# Patient Record
Sex: MALE | Race: BLACK OR AFRICAN AMERICAN | NOT HISPANIC OR LATINO | Employment: FULL TIME | URBAN - METROPOLITAN AREA
[De-identification: names, ages, dates, MRNs, and addresses within clinical notes are randomized per-mention and may not be internally consistent; named-entity substitution may affect disease eponyms.]

---

## 2017-01-05 RX ORDER — ALPRAZOLAM 1 MG/1
TABLET ORAL
Qty: 60 TABLET | Refills: 0 | OUTPATIENT
Start: 2017-01-05

## 2017-01-05 NOTE — TELEPHONE ENCOUNTER
Last office visit 05/18/2016. Last refill date 07/11/2016. Pt is requesting a refill on alprazolam 1mg 1/2 to 1 tablet q 6-8 hour prn

## 2017-01-16 ENCOUNTER — OFFICE VISIT (OUTPATIENT)
Dept: FAMILY MEDICINE | Facility: CLINIC | Age: 62
End: 2017-01-16
Payer: COMMERCIAL

## 2017-01-16 VITALS
HEART RATE: 80 BPM | SYSTOLIC BLOOD PRESSURE: 134 MMHG | WEIGHT: 303.69 LBS | DIASTOLIC BLOOD PRESSURE: 89 MMHG | BODY MASS INDEX: 40.25 KG/M2 | TEMPERATURE: 99 F | HEIGHT: 73 IN | OXYGEN SATURATION: 98 %

## 2017-01-16 DIAGNOSIS — E66.9 OBESITY, DIABETES, AND HYPERTENSION SYNDROME: ICD-10-CM

## 2017-01-16 DIAGNOSIS — F41.9 ANXIETY: ICD-10-CM

## 2017-01-16 DIAGNOSIS — E11.69 COMBINED HYPERLIPIDEMIA ASSOCIATED WITH TYPE 2 DIABETES MELLITUS: ICD-10-CM

## 2017-01-16 DIAGNOSIS — K21.9 GASTROESOPHAGEAL REFLUX DISEASE WITHOUT ESOPHAGITIS: ICD-10-CM

## 2017-01-16 DIAGNOSIS — E11.59 HYPERTENSION ASSOCIATED WITH DIABETES: ICD-10-CM

## 2017-01-16 DIAGNOSIS — E11.59 OBESITY, DIABETES, AND HYPERTENSION SYNDROME: ICD-10-CM

## 2017-01-16 DIAGNOSIS — E11.69 OBESITY, DIABETES, AND HYPERTENSION SYNDROME: ICD-10-CM

## 2017-01-16 DIAGNOSIS — E78.2 COMBINED HYPERLIPIDEMIA ASSOCIATED WITH TYPE 2 DIABETES MELLITUS: ICD-10-CM

## 2017-01-16 DIAGNOSIS — E11.49 TYPE 2 DIABETES MELLITUS WITH NEUROLOGICAL MANIFESTATIONS: Primary | ICD-10-CM

## 2017-01-16 DIAGNOSIS — E66.01 OBESITY, CLASS III, BMI 40-49.9 (MORBID OBESITY): ICD-10-CM

## 2017-01-16 DIAGNOSIS — I15.2 HYPERTENSION ASSOCIATED WITH DIABETES: ICD-10-CM

## 2017-01-16 DIAGNOSIS — I15.2 OBESITY, DIABETES, AND HYPERTENSION SYNDROME: ICD-10-CM

## 2017-01-16 PROCEDURE — 99999 PR PBB SHADOW E&M-EST. PATIENT-LVL III: CPT | Mod: PBBFAC,,, | Performed by: FAMILY MEDICINE

## 2017-01-16 PROCEDURE — 99214 OFFICE O/P EST MOD 30 MIN: CPT | Mod: 25,S$GLB,, | Performed by: FAMILY MEDICINE

## 2017-01-16 PROCEDURE — 3079F DIAST BP 80-89 MM HG: CPT | Mod: S$GLB,,, | Performed by: FAMILY MEDICINE

## 2017-01-16 PROCEDURE — 3044F HG A1C LEVEL LT 7.0%: CPT | Mod: S$GLB,,, | Performed by: FAMILY MEDICINE

## 2017-01-16 PROCEDURE — 3075F SYST BP GE 130 - 139MM HG: CPT | Mod: S$GLB,,, | Performed by: FAMILY MEDICINE

## 2017-01-16 PROCEDURE — 4010F ACE/ARB THERAPY RXD/TAKEN: CPT | Mod: S$GLB,,, | Performed by: FAMILY MEDICINE

## 2017-01-16 PROCEDURE — 1159F MED LIST DOCD IN RCRD: CPT | Mod: S$GLB,,, | Performed by: FAMILY MEDICINE

## 2017-01-16 RX ORDER — BENAZEPRIL HYDROCHLORIDE AND HYDROCHLOROTHIAZIDE 20; 12.5 MG/1; MG/1
TABLET ORAL
Qty: 180 TABLET | Refills: 3 | Status: SHIPPED | OUTPATIENT
Start: 2017-01-16 | End: 2018-08-21 | Stop reason: SDUPTHER

## 2017-01-16 RX ORDER — LANSOPRAZOLE 30 MG/1
30 CAPSULE, DELAYED RELEASE ORAL DAILY
Qty: 90 CAPSULE | Refills: 3 | Status: SHIPPED | OUTPATIENT
Start: 2017-01-16 | End: 2018-04-13 | Stop reason: SDUPTHER

## 2017-01-16 RX ORDER — ALPRAZOLAM 1 MG/1
TABLET ORAL
Qty: 60 TABLET | Refills: 0 | Status: SHIPPED | OUTPATIENT
Start: 2017-01-16 | End: 2017-08-18 | Stop reason: SDUPTHER

## 2017-01-16 RX ORDER — PRAVASTATIN SODIUM 80 MG/1
80 TABLET ORAL DAILY
Qty: 90 TABLET | Refills: 3 | Status: SHIPPED | OUTPATIENT
Start: 2017-01-16 | End: 2017-08-22

## 2017-01-16 RX ORDER — CLONIDINE HYDROCHLORIDE 0.1 MG/1
TABLET ORAL
Qty: 90 TABLET | Refills: 3 | Status: SHIPPED | OUTPATIENT
Start: 2017-01-16 | End: 2018-01-16 | Stop reason: SDUPTHER

## 2017-01-16 RX ORDER — DICLOFENAC SODIUM 75 MG/1
75 TABLET, DELAYED RELEASE ORAL 2 TIMES DAILY
Qty: 60 TABLET | Refills: 5 | Status: SHIPPED | OUTPATIENT
Start: 2017-01-16 | End: 2018-01-25 | Stop reason: SDUPTHER

## 2017-01-16 NOTE — PROGRESS NOTES
"Subjective:      Patient ID: Marcos Marinelli is a 61 y.o. male.    Chief Complaint: Annual Exam    HPI  The patient presents with essential hypertension. The patient is tolerating the medication well and is in excellent compliance. The patient is experiencing no side effects. Counseling was offered regarding low salt diets. The patient has a reduced salt intake. The patient denies chest pain, palpitations, shortness of breath, dyspnea on exertion, left or murmur neck pain, nausea, vomiting, diaphoresis, paroxysmal nocturnal dyspnea, and orthopnea.   Visit Vitals    /89    Pulse 80    Temp 98.6 °F (37 °C) (Oral)    Ht 6' 1" (1.854 m)    Wt (!) 137.8 kg (303 lb 10.9 oz)    SpO2 98%    BMI 40.07 kg/m2        The patient presents with diabetes. The patient denies polyuria, polydipsia, polyphagia, hypoglycemia and paresthesias. The patient's glucose control has been good. Home glucose averages are routinely checked. The patient is without retinopathy currently. The patient has a history of neuropathy. The patient currently complains of no podiatric problems. The patient has excellent compliance.  Lab Results   Component Value Date    HGBA1C 6.1 09/22/2016      The patient presents with hyperlipidemia. The patient reports tolerating the medication well and is in excellent compliance. There have been no medication side effects. The patient denies chest pain, neuropathy, and myalgias. The patient has reduced fat intake and has been exercising. Current treatment has included the medications listed in the med card.   Lab Results   Component Value Date    CHOL 177 09/22/2016    CHOL 148 03/18/2016    CHOL 144 06/04/2015     Lab Results   Component Value Date    HDL 45 09/22/2016    HDL 41 03/18/2016    HDL 45 06/04/2015     Lab Results   Component Value Date    LDLCALC 118.8 09/22/2016    LDLCALC 92.2 03/18/2016    LDLCALC 87.4 06/04/2015     Lab Results   Component Value Date    TRIG 66 09/22/2016    TRIG 74 " 03/18/2016    TRIG 58 06/04/2015     Lab Results   Component Value Date    CHOLHDL 25.4 09/22/2016    CHOLHDL 27.7 03/18/2016    CHOLHDL 31.3 06/04/2015     Lab Results   Component Value Date    ALT 15 09/22/2016    AST 16 09/22/2016    ALKPHOS 69 09/22/2016    BILITOT 0.7 09/22/2016          ASCVD 10-Year Risk Score  Last updated 8 minutes ago         24.9% 0 - 5 Points: Low Risk     5 - 7.5 Points: Medium Risk     7.5 - 100 Points: High Risk       Change:         An Atherosclerotic Cardiovascular Disease (ASCVD) event is defined as myocardial infarction, CHD death, or stroke. The ASCVD risk score ([place]stonedith DE LA CRUZ Jr, et al., 2013) returns the percentage likelihood of a first time ASCVD event.              Age: 61       Sex: Male       Non- : Yes       Smokes Tobacco: No       Has diabetes: Yes       Systolic BP: 134       HDL: 45 mg/dL       Total cholesterol: 177 mg/dL       Prescribed antihypertensives: Yes      I reviewed his risk with him above.      He also has a history of anxiety. He occasionally uses xanax.  He is curretnly doing well with this and the xanax is helping him.      The patient presents with GERD.  Denies chest pain, nausea & vomiting, belching, cramping, distention, dyspepsia, dysphagia, hematochezia, melena, abdominal pain and weight loss.  Current treatment has included medications that are listed in medications list with significant response.  There has been no medicine intolerance.  The patient cannot identify any exacerbating factors.  Avoidance of NSAID's, ASA, carbonated beverages and spicy food was discussed.  He has had an EGD with Dr. Adame.    Health Maintenance Due   Topic Date Due    Influenza Vaccine  08/01/2016       Past Medical History:  Past Medical History   Diagnosis Date    Anxiety     Arthritis      carpal tunnel    Cataract     Colon polyp 11/13/2012     repeat colonosopy in 5 years.    Combined hyperlipidemia associated with type 2 diabetes  mellitus 2/28/2015    ED (erectile dysfunction)     Environmental allergies     GERD (gastroesophageal reflux disease)     Hiatal hernia 11/13/2012     found on EGD    Hyperglycemia     Hyperlipidemia LDL goal < 130     Hypertension goal BP (blood pressure) < 140/90     Insomnia     Strabismus      od as child//    Torn meniscus w-2     right knee    Tremor      controlled with xanax     Past Surgical History   Procedure Laterality Date    Skin lesion excision       removed from head    Esophagogastroduodenoscopy  11/13/2012     Review of patient's allergies indicates:   Allergen Reactions    Codeine      Other reaction(s): Unknown    Escitalopram      Other reaction(s): Headache    Penicillins      Other reaction(s): Unknown    Sulfa (sulfonamide antibiotics) Nausea Only and Rash     Current Outpatient Prescriptions on File Prior to Visit   Medication Sig Dispense Refill    alprazolam (XANAX) 1 MG tablet TAKE 1/2 TO 1 TABLET EVERY 6 TO 8 HOURS AS NEEDED 60 tablet 0    benazepril-hydrochlorthiazide (LOTENSIN HCT) 20-12.5 mg per tablet TAKE 1 TABLET TWICE DAILY 180 tablet 2    cloNIDine (CATAPRES) 0.1 MG tablet TAKE 1 TABLET (0.1 MG TOTAL) BY MOUTH EVERY EVENING. 90 tablet 2    diclofenac (VOLTAREN) 75 MG EC tablet TAKE 1 TABLET TWICE A DAY 20 tablet 0    fexofenadine (ALLEGRA) 180 MG tablet Take 1 tablet (180 mg total) by mouth once daily. 30 tablet 0    lansoprazole (PREVACID) 30 MG capsule TAKE 1 CAPSULE (30 MG TOTAL) BY MOUTH ONCE DAILY. 90 capsule 0    pravastatin (PRAVACHOL) 80 MG tablet Take 1 tablet (80 mg total) by mouth once daily. 90 tablet 3    tramadol (ULTRAM) 50 mg tablet Take 1 tablet (50 mg total) by mouth every 6 (six) hours as needed for Pain. 120 tablet 0     No current facility-administered medications on file prior to visit.      Social History     Social History    Marital status:      Spouse name: N/A    Number of children: N/A    Years of education: N/A  "    Occupational History    Not on file.     Social History Main Topics    Smoking status: Never Smoker    Smokeless tobacco: Never Used    Alcohol use No    Drug use: No    Sexual activity: Yes     Partners: Female     Other Topics Concern    Not on file     Social History Narrative     Family History   Problem Relation Age of Onset    Heart attack Mother     Hypertension Mother     Heart attack Father     Hypertension Father     Brain cancer Father     Cancer Father     Glaucoma Father     Glaucoma Paternal Aunt     Amblyopia Neg Hx     Blindness Neg Hx     Cataracts Neg Hx     Diabetes Neg Hx     Retinal detachment Neg Hx     Strabismus Neg Hx     Stroke Neg Hx     Thyroid disease Neg Hx     Macular degeneration Neg Hx              Review of Systems   Constitutional: Negative.  Negative for chills, diaphoresis and fever.   HENT: Negative for congestion, hearing loss, mouth sores, postnasal drip and sore throat.    Eyes: Negative for pain and visual disturbance.   Respiratory: Negative for cough, chest tightness, shortness of breath and wheezing.    Cardiovascular: Negative for chest pain.   Gastrointestinal: Negative for abdominal pain, anal bleeding, blood in stool, constipation, diarrhea, nausea and vomiting.   Genitourinary: Negative for dysuria and hematuria.   Musculoskeletal: Negative for back pain, neck pain and neck stiffness.   Skin: Negative for rash.   Neurological: Negative for dizziness and weakness.       Objective:     Visit Vitals    /89    Pulse 80    Temp 98.6 °F (37 °C) (Oral)    Ht 6' 1" (1.854 m)    Wt (!) 137.8 kg (303 lb 10.9 oz)    SpO2 98%    BMI 40.07 kg/m2       Physical Exam   Constitutional: He is oriented to person, place, and time. He appears well-developed and well-nourished.   HENT:   Head: Normocephalic and atraumatic.   Right Ear: External ear normal.   Left Ear: External ear normal.   Nose: Nose normal.   Mouth/Throat: Oropharynx is clear " and moist. No oropharyngeal exudate.   Eyes: Conjunctivae and EOM are normal. Pupils are equal, round, and reactive to light. Right eye exhibits no discharge. Left eye exhibits no discharge. No scleral icterus.   Neck: Normal range of motion. Neck supple. No JVD present. No thyromegaly present.   Cardiovascular: Normal rate, regular rhythm, normal heart sounds and intact distal pulses.  Exam reveals no gallop and no friction rub.    No murmur heard.  Pulmonary/Chest: Effort normal and breath sounds normal. No respiratory distress. He has no wheezes. He has no rales. He exhibits no tenderness.   Abdominal: Soft. Bowel sounds are normal. He exhibits no distension and no mass. There is no tenderness. There is no rebound and no guarding.   Musculoskeletal: Normal range of motion. He exhibits no edema or tenderness.   Lymphadenopathy:     He has no cervical adenopathy.   Neurological: He is alert and oriented to person, place, and time. No cranial nerve deficit. Coordination normal.   Skin: Skin is warm and dry. He is not diaphoretic.   Psychiatric: He has a normal mood and affect.       Assessment:     1. Type 2 diabetes mellitus with neurological manifestations    2. Hypertension associated with diabetes    3. Combined hyperlipidemia associated with type 2 diabetes mellitus    4. Anxiety    5. Gastroesophageal reflux disease without esophagitis    6. Obesity, diabetes, and hypertension syndrome    7. Obesity, Class III, BMI 40-49.9 (morbid obesity)        Plan:   Marcos was seen today for annual exam.    Diagnoses and all orders for this visit:    Type 2 diabetes mellitus with neurological manifestations    Hypertension associated with diabetes  -     cloNIDine (CATAPRES) 0.1 MG tablet; TAKE 1 TABLET (0.1 MG TOTAL) BY MOUTH EVERY EVENING.  -     benazepril-hydrochlorthiazide (LOTENSIN HCT) 20-12.5 mg per tablet; TAKE 1 TABLET TWICE DAILY    Combined hyperlipidemia associated with type 2 diabetes mellitus  -      pravastatin (PRAVACHOL) 80 MG tablet; Take 1 tablet (80 mg total) by mouth once daily.  -     Lipid panel; Future  -     Hepatic function panel; Future    Anxiety  -     alprazolam (XANAX) 1 MG tablet; TAKE 1/2 TO 1 TABLET EVERY 6 TO 8 HOURS AS NEEDED    Gastroesophageal reflux disease without esophagitis  -     lansoprazole (PREVACID) 30 MG capsule; Take 1 capsule (30 mg total) by mouth once daily.    Obesity, diabetes, and hypertension syndrome  -     Ambulatory referral to Nutrition Services    Obesity, Class III, BMI 40-49.9 (morbid obesity)  -     Ambulatory referral to Nutrition Services    Other orders  -     diclofenac (VOLTAREN) 75 MG EC tablet; Take 1 tablet (75 mg total) by mouth 2 (two) times daily.  -     Influenza - Quadrivalent (3 years & older) (PF)      F/u in March to look at weight and foot exam.

## 2017-01-16 NOTE — MR AVS SNAPSHOT
[facility]  [address]  [place] [address]  Phone: [phone]  Fax: [phone]                  Marcos Marinelli   2017 3:00 PM   Office Visit    Description:  Male : 1955   Provider:  Karel Belle MD   Department:  [facility]           Reason for Visit     Annual Exam           Diagnoses this Visit        Comments    Type 2 diabetes mellitus with neurological manifestations    -  Primary     Hypertension associated with diabetes         Combined hyperlipidemia associated with type 2 diabetes mellitus         Anxiety         Gastroesophageal reflux disease without esophagitis         Obesity, diabetes, and hypertension syndrome         Obesity, Class III, BMI 40-49.9 (morbid obesity)                To Do List           Future Appointments        Provider Department Dept Phone    2017 9:10 AM LABORATORY, [place] [facility] [phone]    2017 4:00 PM LABORATORY, [address] [facility] [phone]    2017 4:20 PM Rikki Johnson MD [facility] - Hematology Oncology [phone]    3/23/2017 8:10 AM LABORATORY, [place] [facility] [phone]      Goals (5 Years of Data)     None       These Medications        Disp Refills Start End    alprazolam (XANAX) 1 MG tablet 60 tablet 0 2017     TAKE 1/2 TO 1 TABLET EVERY 6 TO 8 HOURS AS NEEDED    Pharmacy: [facility]/pharmacy #[id number]  [place] - [address] [place] [address] Ph #: [phone]       Notes to Pharmacy: Not to exceed 5 additional fills before 2017.    cloNIDine (CATAPRES) 0.1 MG tablet 90 tablet 3 2017     TAKE 1 TABLET (0.1 MG TOTAL) BY MOUTH EVERY EVENING.    Pharmacy: [facility]/pharmacy #[id number] - [place] - [address] Ph #: [phone]       lansoprazole (PREVACID) 30 MG capsule 90 capsule 3 2017     Take 1 capsule (30 mg  total) by mouth once daily. - Oral    Pharmacy: [facility]/pharmacy #[id number] - [address] #: [phone]       pravastatin (PRAVACHOL) 80 MG tablet 90 tablet 3 1/16/2017 1/16/2018    Take 1 tablet (80 mg total) by mouth once daily. - Oral    Pharmacy: [facility]/pharmacy #[id number] - [address] #: [phone]       diclofenac (VOLTAREN) 75 MG EC tablet 60 tablet 5 1/16/2017     Take 1 tablet (75 mg total) by mouth 2 (two) times daily. - Oral    Pharmacy: [facility]/pharmacy #[id number] - [address] #: [phone]       benazepril-hydrochlorthiazide (LOTENSIN HCT) 20-12.5 mg per tablet 180 tablet 3 1/16/2017     TAKE 1 TABLET TWICE DAILY    Ph[facility]: [id number] - [address] #: [phone]         [facility]     [facility] Care [facility] - 24/7 Assistance  Registered nurses in the [facility] provide clinical advisement, health education, appointment booking, and other advisory services.  Call for this free service at [phone].             Medications           Message regarding Medications     Verify the changes and/or additions to your medication regime listed below are the same as discussed with your clinician today.  If any of these changes or additions are incorrect, please notify your healthcare provider.        CHANGE how you are taking these medications     Start Taking Instead of    lansoprazole (PREVACID) 30 MG capsule lansoprazole (PREVACID) 30 MG capsule    Dosage:  Take 1 capsule (30 mg total) by mouth once daily. Dosage:  TAKE 1 CAPSULE (30 MG TOTAL) BY MOUTH ONCE DAILY.    Reason for Change:  Reorder     diclofenac (VOLTAREN) 75 MG EC tablet diclofenac (VOLTAREN) 75 MG EC tablet    Dosage:  Take 1 tablet (75 mg total) by mouth 2 (two) times daily. Dosage:  TAKE 1  "TABLET TWICE A DAY    Reason for Change:  Reorder            Verify that the below list of medications is an accurate representation of the medications you are currently taking.  If none reported, the list may be blank. If incorrect, please contact your healthcare provider. Carry this list with you in case of emergency.           Current Medications     alprazolam (XANAX) 1 MG tablet TAKE 1/2 TO 1 TABLET EVERY 6 TO 8 HOURS AS NEEDED    benazepril-hydrochlorthiazide (LOTENSIN HCT) 20-12.5 mg per tablet TAKE 1 TABLET TWICE DAILY    cloNIDine (CATAPRES) 0.1 MG tablet TAKE 1 TABLET (0.1 MG TOTAL) BY MOUTH EVERY EVENING.    diclofenac (VOLTAREN) 75 MG EC tablet Take 1 tablet (75 mg total) by mouth 2 (two) times daily.    fexofenadine (ALLEGRA) 180 MG tablet Take 1 tablet (180 mg total) by mouth once daily.    lansoprazole (PREVACID) 30 MG capsule Take 1 capsule (30 mg total) by mouth once daily.    pravastatin (PRAVACHOL) 80 MG tablet Take 1 tablet (80 mg total) by mouth once daily.    tramadol (ULTRAM) 50 mg tablet Take 1 tablet (50 mg total) by mouth every 6 (six) hours as needed for Pain.           Clinical Reference Information           Vital Signs - Last Recorded  Most recent update: 1/16/2017  3:02 PM by Allegra Gaming MA    BP Pulse Temp Ht Wt SpO2    134/89 80 98.6 °F (37 °C) (Oral) 6' 1" (1.854 m) (!) 137.8 kg (303 lb 10.9 oz) 98%    BMI                40.07 kg/m2          Blood Pressure          Most Recent Value    BP  134/89      Allergies as of 1/16/2017     Codeine    Escitalopram    Penicillins    Sulfa (Sulfonamide Antibiotics)      Immunizations Administered on Date of Encounter - 1/16/2017     Name Date Dose VIS Date Route    influenza - Quadrivalent - PF (ADULT)  Incomplete 0.5 mL 8/7/2015 Intramuscular      Orders Placed During Today's Visit      Normal Orders This Visit    Ambulatory referral to Nutrition Services     Influenza - Quadrivalent (3 years & older) (PF)     Future Labs/Procedures " Expected by Expires    Hepatic function panel  1/17/2017 1/20/2017    Lipid panel  1/17/2017 (Approximate) 1/20/2017

## 2017-01-17 ENCOUNTER — LAB VISIT (OUTPATIENT)
Dept: LAB | Facility: HOSPITAL | Age: 62
End: 2017-01-17
Attending: FAMILY MEDICINE
Payer: COMMERCIAL

## 2017-01-17 DIAGNOSIS — E11.69 COMBINED HYPERLIPIDEMIA ASSOCIATED WITH TYPE 2 DIABETES MELLITUS: ICD-10-CM

## 2017-01-17 DIAGNOSIS — E78.2 COMBINED HYPERLIPIDEMIA ASSOCIATED WITH TYPE 2 DIABETES MELLITUS: ICD-10-CM

## 2017-01-17 LAB
ALBUMIN SERPL BCP-MCNC: 3.5 G/DL
ALP SERPL-CCNC: 58 U/L
ALT SERPL W/O P-5'-P-CCNC: 15 U/L
AST SERPL-CCNC: 20 U/L
BILIRUB DIRECT SERPL-MCNC: 0.3 MG/DL
BILIRUB SERPL-MCNC: 0.6 MG/DL
CHOLEST/HDLC SERPL: 3.9 {RATIO}
HDL/CHOLESTEROL RATIO: 25.9 %
HDLC SERPL-MCNC: 162 MG/DL
HDLC SERPL-MCNC: 42 MG/DL
LDLC SERPL CALC-MCNC: 109 MG/DL
NONHDLC SERPL-MCNC: 120 MG/DL
PROT SERPL-MCNC: 6.8 G/DL
TRIGL SERPL-MCNC: 55 MG/DL

## 2017-01-17 PROCEDURE — 36415 COLL VENOUS BLD VENIPUNCTURE: CPT | Mod: PO

## 2017-01-17 PROCEDURE — 80076 HEPATIC FUNCTION PANEL: CPT

## 2017-01-17 PROCEDURE — 80061 LIPID PANEL: CPT

## 2017-01-18 DIAGNOSIS — E78.5 HYPERLIPIDEMIA, UNSPECIFIED HYPERLIPIDEMIA TYPE: Primary | ICD-10-CM

## 2017-01-18 PROCEDURE — 90686 IIV4 VACC NO PRSV 0.5 ML IM: CPT | Mod: S$GLB,,, | Performed by: FAMILY MEDICINE

## 2017-01-18 PROCEDURE — 90471 IMMUNIZATION ADMIN: CPT | Mod: S$GLB,,, | Performed by: FAMILY MEDICINE

## 2017-01-18 RX ORDER — EZETIMIBE 10 MG/1
10 TABLET ORAL DAILY
COMMUNITY
End: 2017-02-02 | Stop reason: SDUPTHER

## 2017-01-18 NOTE — TELEPHONE ENCOUNTER
Pended to Dr. Belle will schedule once approved. Labs 3 months. Liver lipid. Pt  Will be notified of appointment and a letter will be mailed to remind Pt.

## 2017-01-20 ENCOUNTER — TELEPHONE (OUTPATIENT)
Dept: FAMILY MEDICINE | Facility: CLINIC | Age: 62
End: 2017-01-20

## 2017-01-20 DIAGNOSIS — E78.5 HYPERLIPIDEMIA, UNSPECIFIED HYPERLIPIDEMIA TYPE: Primary | ICD-10-CM

## 2017-01-20 NOTE — TELEPHONE ENCOUNTER
----- Message from Karel Belle MD sent at 1/17/2017  7:34 PM CST -----  Also, start a baby aspirin 81 mg a day.  Put it on the medcard.

## 2017-02-02 RX ORDER — EZETIMIBE 10 MG/1
10 TABLET ORAL DAILY
Qty: 30 TABLET | Refills: 11 | Status: SHIPPED | OUTPATIENT
Start: 2017-02-02 | End: 2017-08-22 | Stop reason: SDUPTHER

## 2017-02-13 ENCOUNTER — OFFICE VISIT (OUTPATIENT)
Dept: HEMATOLOGY/ONCOLOGY | Facility: CLINIC | Age: 62
End: 2017-02-13
Payer: COMMERCIAL

## 2017-02-13 ENCOUNTER — LAB VISIT (OUTPATIENT)
Dept: LAB | Facility: HOSPITAL | Age: 62
End: 2017-02-13
Attending: INTERNAL MEDICINE
Payer: COMMERCIAL

## 2017-02-13 VITALS
OXYGEN SATURATION: 97 % | HEART RATE: 74 BPM | BODY MASS INDEX: 40 KG/M2 | WEIGHT: 301.81 LBS | HEIGHT: 73 IN | SYSTOLIC BLOOD PRESSURE: 123 MMHG | RESPIRATION RATE: 20 BRPM | TEMPERATURE: 98 F | DIASTOLIC BLOOD PRESSURE: 85 MMHG

## 2017-02-13 DIAGNOSIS — D69.6 THROMBOCYTOPENIA: ICD-10-CM

## 2017-02-13 DIAGNOSIS — D72.819 LEUKOPENIA, UNSPECIFIED TYPE: Primary | ICD-10-CM

## 2017-02-13 LAB
BASOPHILS # BLD AUTO: 0.02 K/UL
BASOPHILS NFR BLD: 0.5 %
DIFFERENTIAL METHOD: ABNORMAL
EOSINOPHIL # BLD AUTO: 0.1 K/UL
EOSINOPHIL NFR BLD: 2.2 %
ERYTHROCYTE [DISTWIDTH] IN BLOOD BY AUTOMATED COUNT: 14.3 %
HCT VFR BLD AUTO: 40.4 %
HGB BLD-MCNC: 13.8 G/DL
LYMPHOCYTES # BLD AUTO: 2.1 K/UL
LYMPHOCYTES NFR BLD: 51.9 %
MCH RBC QN AUTO: 30.5 PG
MCHC RBC AUTO-ENTMCNC: 34.2 %
MCV RBC AUTO: 89 FL
MONOCYTES # BLD AUTO: 0.4 K/UL
MONOCYTES NFR BLD: 10.5 %
NEUTROPHILS # BLD AUTO: 1.4 K/UL
NEUTROPHILS NFR BLD: 34.9 %
PLATELET # BLD AUTO: 175 K/UL
PMV BLD AUTO: 10.5 FL
RBC # BLD AUTO: 4.52 M/UL
WBC # BLD AUTO: 4.01 K/UL

## 2017-02-13 PROCEDURE — 3079F DIAST BP 80-89 MM HG: CPT | Mod: S$GLB,,, | Performed by: INTERNAL MEDICINE

## 2017-02-13 PROCEDURE — 99999 PR PBB SHADOW E&M-EST. PATIENT-LVL III: CPT | Mod: PBBFAC,,, | Performed by: INTERNAL MEDICINE

## 2017-02-13 PROCEDURE — 36415 COLL VENOUS BLD VENIPUNCTURE: CPT

## 2017-02-13 PROCEDURE — 3074F SYST BP LT 130 MM HG: CPT | Mod: S$GLB,,, | Performed by: INTERNAL MEDICINE

## 2017-02-13 PROCEDURE — 99214 OFFICE O/P EST MOD 30 MIN: CPT | Mod: S$GLB,,, | Performed by: INTERNAL MEDICINE

## 2017-02-13 PROCEDURE — 85025 COMPLETE CBC W/AUTO DIFF WBC: CPT

## 2017-02-13 NOTE — PROGRESS NOTES
Reason for visit: Leukopenia    The patient is a 61-year-old  male who presents to the hematology oncology clinic today for follow up for leukopenia. He reports chronic fatigue. He denies any fever, chills or night sweats. He denies shortness of breath or chest pain. He denies any melena, hematemesis, or hematuria. Bowel movements are normal. Urination is normal. He denies any loss of appetite.    I have reviewed all of the patient's interval clinical history available in [facility].    PAST MEDICAL HISTORY:   1. Hypertension   2. Dyslipidemia  3. GERD   4. Anxiety/Depression  5. Migraine  6. Lumbar degenerative disc disease    SURGICAL HISTORY:   1. Scalp mass excision which was a lipoma    FAMILY HISTORY: Father  of complications related to a brain tumor at age 81. He denies any other immediate family members with cancer or bleeding/clotting disorders.     SOCIAL HISTORY: He does not smoke cigarettes. He does not drink alcohol. He has never used any recreational drugs. He works as an  and does airconditioning work as well. He is  and lives with his wife in [place]. He has 5 children.     ALLERGIES: Reviewed on medication card.     MEDICATIONS: [Medcard has been reviewed and/or reconciled.]     REVIEW OF SYSTEMS:   GENERAL: [No fevers, chills or sweats. No loss of appetite.] Reports fatigue.  HEENT: [No blurred vision, tinnitus, nasal discharge, sorethroat or dysphagia.]   HEART: [No chest pain, palpitations or shortness of breath.]   LUNGS: [No breathing problems.] He denies cough and hemoptysis.  ABDOMEN: [No abdominal pain, nausea, vomiting, diarrhea, constipation or melena.]   GENITOURINARY: [No bleeding or malodorous discharge.] No dysuria.  NEURO: [No headache, dizziness or vertigo.]   HEMATOLOGY: [No easy bruising, spontaneous bleeding or blood clots in the past].   MUSCULOSKELETAL: Denies myalgias or arthralgias.  SKIN: [No rashes or skin lesions.]   PSYCHIATRY: [No  depression or anxiety.]     PHYSICAL EXAMINATION:   VS: Reviewed on nurse's notes   APPEARANCE: The patient is a obese and well groomed  male who appears in no acute distress.  HEENT: No scleral icterus. No oral ulcers, lesions. Throat clear   HEAD: No sinus tenderness.  NECK: Supple. No palpable lymphadenopathy. Thyroid non-tender, no palpable masses.  CHEST: Clear to auscultation bilaterally. No rales. No rhonchi. Unlabored respirations.   CARDIOVASCULAR: Normal S1, S2. Normal rate. Regular rhythm.   ABDOMEN: Bowel sounds normal. No abdominal tenderness or distension. No hepatomegaly. No splenomegaly.   LYMPHATIC: No palpable supraclavicular, axillary nodes   EXTREMITIES: No clubbing, cyanosis. No edema in bilateral lower extremities.   SKIN: No lesions. No petechiae. No ecchymoses. No induration or nodules.   NEUROLOGIC: No focal findings. Alert & Oriented x 3. Mood appropriate to affect     LABS:   Reviewed     IMAGING:   Reviewed     IMPRESSION:   1. Normocytic anemia  2. Thrombocytopenia  3. Chronic leukopenia    PLAN:   1. I had a detailed discussion with the patient today about the results of his CBC. No significant anemia and thrombocytopenia noted today. His chronic leukopenia is stable. We will continue with close observation at this time. No urgent indication for any imaging or bone marrow biopsy at this time. He is clinically asymptomatic.  2. I have recommended proceeding with ultrasound of the abdomen to evaluate for fatty liver with his prior history of thrombocytopenia.    Follow up in 6 months with CBC. He knows to call sooner for any new problems or questions.    Rikki Johnson MD

## 2017-02-24 ENCOUNTER — TELEPHONE (OUTPATIENT)
Dept: RADIOLOGY | Facility: HOSPITAL | Age: 62
End: 2017-02-24

## 2017-02-27 ENCOUNTER — TELEPHONE (OUTPATIENT)
Dept: RADIOLOGY | Facility: HOSPITAL | Age: 62
End: 2017-02-27

## 2017-02-28 ENCOUNTER — HOSPITAL ENCOUNTER (OUTPATIENT)
Dept: RADIOLOGY | Facility: HOSPITAL | Age: 62
Discharge: HOME | End: 2017-02-28
Attending: INTERNAL MEDICINE
Payer: COMMERCIAL

## 2017-02-28 ENCOUNTER — TELEPHONE (OUTPATIENT)
Dept: HEMATOLOGY/ONCOLOGY | Facility: CLINIC | Age: 62
End: 2017-02-28

## 2017-02-28 DIAGNOSIS — D72.819 LEUKOPENIA: ICD-10-CM

## 2017-02-28 DIAGNOSIS — D64.9 NORMOCYTIC ANEMIA: ICD-10-CM

## 2017-02-28 DIAGNOSIS — D69.6 THROMBOCYTOPENIA: ICD-10-CM

## 2017-02-28 PROCEDURE — 76700 US EXAM ABDOM COMPLETE: CPT | Mod: TC,PO

## 2017-02-28 PROCEDURE — 76700 US EXAM ABDOM COMPLETE: CPT | Mod: 26,,, | Performed by: RADIOLOGY

## 2017-02-28 NOTE — TELEPHONE ENCOUNTER
----- Message from Rikki Johnson MD sent at 2/28/2017  2:49 PM CST -----  Please let him know that ultrasound results including liver and spleen look normal which is good news. Thank you!

## 2017-04-05 ENCOUNTER — LAB VISIT (OUTPATIENT)
Dept: LAB | Facility: HOSPITAL | Age: 62
End: 2017-04-05
Attending: FAMILY MEDICINE
Payer: COMMERCIAL

## 2017-04-05 DIAGNOSIS — E11.9 DIABETES MELLITUS WITHOUT COMPLICATION: ICD-10-CM

## 2017-04-05 DIAGNOSIS — E11.59 HYPERTENSION ASSOCIATED WITH DIABETES: ICD-10-CM

## 2017-04-05 DIAGNOSIS — I15.2 HYPERTENSION ASSOCIATED WITH DIABETES: ICD-10-CM

## 2017-04-05 LAB
ALBUMIN SERPL BCP-MCNC: 3.4 G/DL
ALP SERPL-CCNC: 64 U/L
ALT SERPL W/O P-5'-P-CCNC: 12 U/L
ANION GAP SERPL CALC-SCNC: 5 MMOL/L
AST SERPL-CCNC: 18 U/L
BILIRUB SERPL-MCNC: 0.6 MG/DL
BUN SERPL-MCNC: 11 MG/DL
CALCIUM SERPL-MCNC: 8.8 MG/DL
CHLORIDE SERPL-SCNC: 103 MMOL/L
CO2 SERPL-SCNC: 31 MMOL/L
CREAT SERPL-MCNC: 1 MG/DL
EST. GFR  (AFRICAN AMERICAN): >60 ML/MIN/1.73 M^2
EST. GFR  (NON AFRICAN AMERICAN): >60 ML/MIN/1.73 M^2
GLUCOSE SERPL-MCNC: 88 MG/DL
POTASSIUM SERPL-SCNC: 3.9 MMOL/L
PROT SERPL-MCNC: 6.6 G/DL
SODIUM SERPL-SCNC: 139 MMOL/L

## 2017-04-05 PROCEDURE — 80053 COMPREHEN METABOLIC PANEL: CPT

## 2017-04-05 PROCEDURE — 36415 COLL VENOUS BLD VENIPUNCTURE: CPT | Mod: PO

## 2017-04-05 PROCEDURE — 83036 HEMOGLOBIN GLYCOSYLATED A1C: CPT

## 2017-04-06 LAB
ESTIMATED AVG GLUCOSE: 128 MG/DL
HBA1C MFR BLD HPLC: 6.1 %

## 2017-04-07 ENCOUNTER — TELEPHONE (OUTPATIENT)
Dept: FAMILY MEDICINE | Facility: CLINIC | Age: 62
End: 2017-04-07

## 2017-04-07 DIAGNOSIS — E11.9 DIABETES MELLITUS WITHOUT COMPLICATION: Primary | ICD-10-CM

## 2017-04-07 NOTE — TELEPHONE ENCOUNTER
I have signed for the following orders AND/OR meds.  Please call the patient and ask the patient to schedule the testing AND/OR inform about any medications that were sent.      Orders Placed This Encounter   Procedures    Hemoglobin A1c     Standing Status:   Future     Standing Expiration Date:   6/6/2018

## 2017-07-21 DIAGNOSIS — F41.9 ANXIETY: ICD-10-CM

## 2017-07-22 RX ORDER — ALPRAZOLAM 1 MG/1
TABLET ORAL
Qty: 60 TABLET | OUTPATIENT
Start: 2017-07-22

## 2017-07-24 DIAGNOSIS — F41.9 ANXIETY: ICD-10-CM

## 2017-07-24 RX ORDER — ALPRAZOLAM 1 MG/1
TABLET ORAL
Qty: 60 TABLET | Refills: 0 | OUTPATIENT
Start: 2017-07-24

## 2017-07-24 NOTE — TELEPHONE ENCOUNTER
----- Message from Peri Obrien sent at 7/24/2017 10:26 AM CDT -----  Contact: pt  Pt states he is returning the nurse's call regarding his medication.  Pt states he needs an authorization.    1. What is the name of the medication you are requesting? Xanax  2. What is the dose? n/a  3. How do you take the medication? Orally, topically, etc? orally  4. How often do you take this medication? As needed  5. Do you need a 30 day or 90 day supply? 30  6. How many refills are you requesting? 3  7. What is your preferred pharmacy and location of the pharmacy? [facility] in [place]  8. Who can we contact with further questions? Pt @ [phone]

## 2017-07-27 ENCOUNTER — TELEPHONE (OUTPATIENT)
Dept: FAMILY MEDICINE | Facility: CLINIC | Age: 62
End: 2017-07-27

## 2017-07-27 NOTE — TELEPHONE ENCOUNTER
----- Message from Ness Kumar sent at 7/27/2017 10:50 AM CDT -----  Would like to speak to nurse. Please call back at [phone]. thanks

## 2017-07-27 NOTE — TELEPHONE ENCOUNTER
Pt returning call about the denial of his prescription. Pt scheduled appointment 08/18/17 @ 10:00.

## 2017-08-18 ENCOUNTER — OFFICE VISIT (OUTPATIENT)
Dept: FAMILY MEDICINE | Facility: CLINIC | Age: 62
End: 2017-08-18
Payer: COMMERCIAL

## 2017-08-18 ENCOUNTER — LAB VISIT (OUTPATIENT)
Dept: LAB | Facility: HOSPITAL | Age: 62
End: 2017-08-18
Attending: FAMILY MEDICINE
Payer: COMMERCIAL

## 2017-08-18 ENCOUNTER — TELEPHONE (OUTPATIENT)
Dept: OPHTHALMOLOGY | Facility: CLINIC | Age: 62
End: 2017-08-18

## 2017-08-18 VITALS
DIASTOLIC BLOOD PRESSURE: 86 MMHG | BODY MASS INDEX: 37.57 KG/M2 | TEMPERATURE: 98 F | HEART RATE: 88 BPM | SYSTOLIC BLOOD PRESSURE: 130 MMHG | HEIGHT: 73 IN | WEIGHT: 283.5 LBS

## 2017-08-18 DIAGNOSIS — E11.69 COMBINED HYPERLIPIDEMIA ASSOCIATED WITH TYPE 2 DIABETES MELLITUS: ICD-10-CM

## 2017-08-18 DIAGNOSIS — E78.2 COMBINED HYPERLIPIDEMIA ASSOCIATED WITH TYPE 2 DIABETES MELLITUS: ICD-10-CM

## 2017-08-18 DIAGNOSIS — R76.8 ABNORMAL HEPATITIS SEROLOGY: Primary | ICD-10-CM

## 2017-08-18 DIAGNOSIS — I15.2 HYPERTENSION ASSOCIATED WITH DIABETES: ICD-10-CM

## 2017-08-18 DIAGNOSIS — F41.9 ANXIETY: ICD-10-CM

## 2017-08-18 DIAGNOSIS — K21.9 GASTROESOPHAGEAL REFLUX DISEASE WITHOUT ESOPHAGITIS: ICD-10-CM

## 2017-08-18 DIAGNOSIS — E11.49 TYPE 2 DIABETES MELLITUS WITH NEUROLOGICAL MANIFESTATIONS: ICD-10-CM

## 2017-08-18 DIAGNOSIS — E11.59 HYPERTENSION ASSOCIATED WITH DIABETES: ICD-10-CM

## 2017-08-18 DIAGNOSIS — R76.8 ABNORMAL HEPATITIS SEROLOGY: ICD-10-CM

## 2017-08-18 LAB
ALBUMIN SERPL BCP-MCNC: 3.8 G/DL
ALP SERPL-CCNC: 70 U/L
ALT SERPL W/O P-5'-P-CCNC: 19 U/L
ANION GAP SERPL CALC-SCNC: 6 MMOL/L
AST SERPL-CCNC: 22 U/L
BILIRUB SERPL-MCNC: 0.9 MG/DL
BUN SERPL-MCNC: 12 MG/DL
CALCIUM SERPL-MCNC: 9.6 MG/DL
CHLORIDE SERPL-SCNC: 102 MMOL/L
CHOLEST/HDLC SERPL: 3.5 {RATIO}
CO2 SERPL-SCNC: 31 MMOL/L
CREAT SERPL-MCNC: 1.1 MG/DL
EST. GFR  (AFRICAN AMERICAN): >60 ML/MIN/1.73 M^2
EST. GFR  (NON AFRICAN AMERICAN): >60 ML/MIN/1.73 M^2
GLUCOSE SERPL-MCNC: 96 MG/DL
HDL/CHOLESTEROL RATIO: 28.2 %
HDLC SERPL-MCNC: 181 MG/DL
HDLC SERPL-MCNC: 51 MG/DL
LDLC SERPL CALC-MCNC: 118 MG/DL
NONHDLC SERPL-MCNC: 130 MG/DL
POTASSIUM SERPL-SCNC: 4.1 MMOL/L
PROT SERPL-MCNC: 7.2 G/DL
SODIUM SERPL-SCNC: 139 MMOL/L
TRIGL SERPL-MCNC: 60 MG/DL

## 2017-08-18 PROCEDURE — 3008F BODY MASS INDEX DOCD: CPT | Mod: S$GLB,,, | Performed by: FAMILY MEDICINE

## 2017-08-18 PROCEDURE — 4010F ACE/ARB THERAPY RXD/TAKEN: CPT | Mod: S$GLB,,, | Performed by: FAMILY MEDICINE

## 2017-08-18 PROCEDURE — 3079F DIAST BP 80-89 MM HG: CPT | Mod: S$GLB,,, | Performed by: FAMILY MEDICINE

## 2017-08-18 PROCEDURE — 80061 LIPID PANEL: CPT

## 2017-08-18 PROCEDURE — 36415 COLL VENOUS BLD VENIPUNCTURE: CPT | Mod: PO

## 2017-08-18 PROCEDURE — 3075F SYST BP GE 130 - 139MM HG: CPT | Mod: S$GLB,,, | Performed by: FAMILY MEDICINE

## 2017-08-18 PROCEDURE — 80074 ACUTE HEPATITIS PANEL: CPT

## 2017-08-18 PROCEDURE — 99214 OFFICE O/P EST MOD 30 MIN: CPT | Mod: S$GLB,,, | Performed by: FAMILY MEDICINE

## 2017-08-18 PROCEDURE — 3044F HG A1C LEVEL LT 7.0%: CPT | Mod: S$GLB,,, | Performed by: FAMILY MEDICINE

## 2017-08-18 PROCEDURE — 99999 PR PBB SHADOW E&M-EST. PATIENT-LVL III: CPT | Mod: PBBFAC,,, | Performed by: FAMILY MEDICINE

## 2017-08-18 PROCEDURE — 80053 COMPREHEN METABOLIC PANEL: CPT

## 2017-08-18 RX ORDER — ALPRAZOLAM 1 MG/1
TABLET ORAL
Qty: 60 TABLET | Refills: 0 | Status: SHIPPED | OUTPATIENT
Start: 2017-08-18 | End: 2017-08-18 | Stop reason: SDUPTHER

## 2017-08-18 RX ORDER — ALPRAZOLAM 1 MG/1
TABLET ORAL
Qty: 60 TABLET | Refills: 5 | Status: SHIPPED | OUTPATIENT
Start: 2017-08-18 | End: 2018-04-18

## 2017-08-18 NOTE — PROGRESS NOTES
"Subjective:      Patient ID: Marcos Marinelli is a 62 y.o. male.    Chief Complaint: Medication Refill    HPI  He states that he had given plasma recently and was told that he had "hepatitis" and they could not use it. He wants to get checked for this. kimi case is unsure of what type of hepatitis.     He has a tremendous amount of anxiety and he is on xanax and he states that this does help him and he would like to continue it.  He is on 1 mg tabs and takes 1/2 as needed.    The patient presents with diabetes.  The patient denies polyuria, polydipsia, polyphagia, hypoglycemia and paresthesias.  The patient's glucose control has been good.  Home glucose averages are routinely checked.  The patient is without retinopathy currently.  The patient has no history of neuropathy.  The patient currently complains of no podiatric problems.  The patient has excellent compliance.  Hemoglobin A1C   Date Value Ref Range Status   04/05/2017 6.1 4.5 - 6.2 % Final     Comment:     According to ADA guidelines, hemoglobin A1C <7.0% represents  optimal control in non-pregnant diabetic patients.  Different  metrics may apply to specific populations.   Standards of Medical Care in Diabetes - 2016.  For the purpose of screening for the presence of diabetes:  <5.7%     Consistent with the absence of diabetes  5.7-6.4%  Consistent with increasing risk for diabetes   (prediabetes)  >or=6.5%  Consistent with diabetes  Currently no consensus exists for use of hemoglobin A1C  for diagnosis of diabetes for children.     09/22/2016 6.1 4.5 - 6.2 % Final     Comment:     According to ADA guidelines, hemoglobin A1C <7.0% represents  optimal control in non-pregnant diabetic patients.  Different  metrics may apply to specific populations.   Standards of Medical Care in Diabetes - 2016.  For the purpose of screening for the presence of diabetes:  <5.7%     Consistent with the absence of diabetes  5.7-6.4%  Consistent with increasing risk for diabetes " "  (prediabetes)  >or=6.5%  Consistent with diabetes  Currently no consensus exists for use of hemoglobin A1C  for diagnosis of diabetes for children.     03/18/2016 5.9 4.5 - 6.2 % Final     No results found for: TRESA [id number]    The patient presents with essential hypertension.  The patient is tolerating the medication well and is in excellent compliance.  The patient is experiencing no side effects.  Counseling was offered regarding low salt diets.  The patient has a reduced salt intake.  The patient denies chest pain, palpitations, shortness of breath, dyspnea on exertion, left or murmur neck pain, nausea, vomiting, diaphoresis, paroxysmal nocturnal dyspnea, and orthopnea.   /86   Pulse 88   Temp 97.9 °F (36.6 °C)   Ht 6' 1" (1.854 m)   Wt 128.6 kg (283 lb 8.2 oz)   BMI 37.40 kg/m²     The patient presents with hyperlipidemia.  The patient reports tolerating the medication well and is in excellent compliance.  There have been no medication side effects.  The patient denies chest pain, neuropathy, and myalgias.  The patient has reduced fat intake and has been exercising.  Current treatment has included the medications listed in the med card.    Lab Results   Component Value Date    CHOL 162 01/17/2017    CHOL 177 09/22/2016    CHOL 148 03/18/2016       Lab Results   Component Value Date    HDL 42 01/17/2017    HDL 45 09/22/2016    HDL 41 03/18/2016       Lab Results   Component Value Date    LDLCALC 109.0 01/17/2017    LDLCALC 118.8 09/22/2016    LDLCALC 92.2 03/18/2016       Lab Results   Component Value Date    TRIG 55 01/17/2017    TRIG 66 09/22/2016    TRIG 74 03/18/2016       Lab Results   Component Value Date    CHOLHDL 25.9 01/17/2017    CHOLHDL 25.4 09/22/2016    CHOLHDL 27.7 03/18/2016     Lab Results   Component Value Date    ALT 12 04/05/2017    AST 18 04/05/2017    ALKPHOS 64 04/05/2017    BILITOT 0.6 04/05/2017     I discussed his ascvd score with him and that it is high.   The " 10-year ASCVD risk score (Ajit DE LA CRUZ Jr., et al., 2013) is: 24.4%    Values used to calculate the score:      Age: 62 years      Sex: Male      Is Non- : Yes      Diabetic: Yes      Tobacco smoker: No      Systolic Blood Pressure: 130 mmHg      Is BP treated: Yes      HDL Cholesterol: 42 mg/dL      Total Cholesterol: 162 mg/dL      He refuses a foot exam.      Health Maintenance Due   Topic Date Due    Foot Exam  03/21/2017    Eye Exam  07/06/2017    Influenza Vaccine  08/01/2017    Zoster Vaccine  08/06/2017       Past Medical History:  Past Medical History:   Diagnosis Date    Anxiety     Arthritis     carpal tunnel    Cataract     Colon polyp 11/13/2012    repeat colonosopy in 5 years.    Combined hyperlipidemia associated with type 2 diabetes mellitus 2/28/2015    ED (erectile dysfunction)     Environmental allergies     GERD (gastroesophageal reflux disease)     Hiatal hernia 11/13/2012    found on EGD    Hyperglycemia     Hyperlipidemia LDL goal < 130     Hypertension goal BP (blood pressure) < 140/90     Insomnia     Strabismus     od as child//    Torn meniscus w-2    right knee    Tremor     controlled with xanax     Past Surgical History:   Procedure Laterality Date    ESOPHAGOGASTRODUODENOSCOPY  11/13/2012    SKIN LESION EXCISION      removed from head     Review of patient's allergies indicates:   Allergen Reactions    Codeine      Other reaction(s): Unknown    Escitalopram      Other reaction(s): Headache    Iodine     Penicillins      Other reaction(s): Unknown    Sulfa (sulfonamide antibiotics) Nausea Only and Rash     Current Outpatient Prescriptions on File Prior to Visit   Medication Sig Dispense Refill    alprazolam (XANAX) 1 MG tablet TAKE 1/2 TO 1 TABLET EVERY 6 TO 8 HOURS AS NEEDED 60 tablet 0    benazepril-hydrochlorthiazide (LOTENSIN HCT) 20-12.5 mg per tablet TAKE 1 TABLET TWICE DAILY 180 tablet 3    cloNIDine (CATAPRES) 0.1 MG tablet TAKE 1  TABLET (0.1 MG TOTAL) BY MOUTH EVERY EVENING. 90 tablet 3    diclofenac (VOLTAREN) 75 MG EC tablet Take 1 tablet (75 mg total) by mouth 2 (two) times daily. 60 tablet 5    ezetimibe (ZETIA) 10 mg tablet Take 1 tablet (10 mg total) by mouth once daily. 30 tablet 11    fexofenadine (ALLEGRA) 180 MG tablet Take 1 tablet (180 mg total) by mouth once daily. 30 tablet 0    lansoprazole (PREVACID) 30 MG capsule Take 1 capsule (30 mg total) by mouth once daily. 90 capsule 3    pravastatin (PRAVACHOL) 80 MG tablet Take 1 tablet (80 mg total) by mouth once daily. 90 tablet 3    tramadol (ULTRAM) 50 mg tablet Take 1 tablet (50 mg total) by mouth every 6 (six) hours as needed for Pain. 120 tablet 0     No current facility-administered medications on file prior to visit.      Social History     Social History    Marital status:      Spouse name: N/A    Number of children: N/A    Years of education: N/A     Occupational History    Not on file.     Social History Main Topics    Smoking status: Never Smoker    Smokeless tobacco: Never Used    Alcohol use No    Drug use: No    Sexual activity: Yes     Partners: Female     Other Topics Concern    Not on file     Social History Narrative    No narrative on file     Family History   Problem Relation Age of Onset    Heart attack Mother     Hypertension Mother     Heart attack Father     Hypertension Father     Brain cancer Father     Cancer Father     Glaucoma Father     Glaucoma Paternal Aunt     Amblyopia Neg Hx     Blindness Neg Hx     Cataracts Neg Hx     Diabetes Neg Hx     Retinal detachment Neg Hx     Strabismus Neg Hx     Stroke Neg Hx     Thyroid disease Neg Hx     Macular degeneration Neg Hx            Review of Systems   Constitutional: Negative.  Negative for chills, diaphoresis and fever.   HENT: Negative for congestion, hearing loss, mouth sores, postnasal drip and sore throat.    Eyes: Negative for pain and visual disturbance.  "  Respiratory: Negative for cough, chest tightness, shortness of breath and wheezing.    Cardiovascular: Negative for chest pain.   Gastrointestinal: Negative for abdominal pain, anal bleeding, blood in stool, constipation, diarrhea, nausea and vomiting.   Genitourinary: Negative for dysuria and hematuria.   Musculoskeletal: Negative for back pain, neck pain and neck stiffness.   Skin: Negative for rash.   Neurological: Negative for dizziness and weakness.       Objective:   /86   Pulse 88   Temp 97.9 °F (36.6 °C)   Ht 6' 1" (1.854 m)   Wt 128.6 kg (283 lb 8.2 oz)   BMI 37.40 kg/m²     Physical Exam   Constitutional: He is oriented to person, place, and time. He appears well-developed and well-nourished.   HENT:   Head: Normocephalic and atraumatic.   Right Ear: External ear normal.   Left Ear: External ear normal.   Nose: Nose normal.   Mouth/Throat: Oropharynx is clear and moist. No oropharyngeal exudate.   Eyes: Conjunctivae and EOM are normal. Pupils are equal, round, and reactive to light. Right eye exhibits no discharge. Left eye exhibits no discharge. No scleral icterus.   Neck: Normal range of motion. Neck supple. No JVD present. No thyromegaly present.   Cardiovascular: Normal rate, regular rhythm, normal heart sounds and intact distal pulses.  Exam reveals no gallop and no friction rub.    No murmur heard.  Pulmonary/Chest: Effort normal and breath sounds normal. No respiratory distress. He has no wheezes. He has no rales. He exhibits no tenderness.   Abdominal: Soft. Bowel sounds are normal. He exhibits no distension and no mass. There is no tenderness. There is no rebound and no guarding.   Musculoskeletal: Normal range of motion. He exhibits no edema or tenderness.   Lymphadenopathy:     He has no cervical adenopathy.   Neurological: He is alert and oriented to person, place, and time. No cranial nerve deficit. Coordination normal.   Skin: Skin is warm and dry. He is not diaphoretic. "   Psychiatric: He has a normal mood and affect.       Assessment:     1. Abnormal hepatitis serology    2. Hypertension associated with diabetes    3. Gastroesophageal reflux disease without esophagitis    4. Combined hyperlipidemia associated with type 2 diabetes mellitus    5. Anxiety        Plan:   Marcos was seen today for medication refill.    Diagnoses and all orders for this visit:    Abnormal hepatitis serology  -     Hepatitis panel, acute; Future    Hypertension associated with diabetes  -     Comprehensive metabolic panel; Future    Gastroesophageal reflux disease without esophagitis    Combined hyperlipidemia associated with type 2 diabetes mellitus  -     Comprehensive metabolic panel; Future  -     Lipid panel; Future    Anxiety     -     alprazolam (XANAX) 1 MG tablet; TAKE 1/2 TO 1 TABLET EVERY 6 TO 8 HOURS AS NEEDED    Type 2 diabetes mellitus with neurological manifestations  -     Ambulatory referral to Optometry  -     Comprehensive metabolic panel; Future      I discussed his ASCVD score and we will change his pravastatin to crestor if the ASCVD score is still high.      Check him again in 3-4 weeks to do his foot exam and also to review his lipids.

## 2017-08-21 ENCOUNTER — OFFICE VISIT (OUTPATIENT)
Dept: HEMATOLOGY/ONCOLOGY | Facility: CLINIC | Age: 62
End: 2017-08-21
Payer: COMMERCIAL

## 2017-08-21 VITALS
TEMPERATURE: 98 F | WEIGHT: 294.75 LBS | OXYGEN SATURATION: 98 % | DIASTOLIC BLOOD PRESSURE: 78 MMHG | HEIGHT: 73 IN | BODY MASS INDEX: 39.07 KG/M2 | HEART RATE: 77 BPM | SYSTOLIC BLOOD PRESSURE: 114 MMHG

## 2017-08-21 DIAGNOSIS — D69.6 THROMBOCYTOPENIA: ICD-10-CM

## 2017-08-21 DIAGNOSIS — D64.9 NORMOCYTIC ANEMIA: Primary | ICD-10-CM

## 2017-08-21 DIAGNOSIS — D72.819 LEUKOPENIA, UNSPECIFIED TYPE: ICD-10-CM

## 2017-08-21 LAB
HAV IGM SERPL QL IA: NEGATIVE
HBV CORE IGM SERPL QL IA: NEGATIVE
HBV SURFACE AG SERPL QL IA: NEGATIVE
HCV AB SERPL QL IA: NEGATIVE

## 2017-08-21 PROCEDURE — 3074F SYST BP LT 130 MM HG: CPT | Mod: S$GLB,,, | Performed by: INTERNAL MEDICINE

## 2017-08-21 PROCEDURE — 3008F BODY MASS INDEX DOCD: CPT | Mod: S$GLB,,, | Performed by: INTERNAL MEDICINE

## 2017-08-21 PROCEDURE — 99999 PR PBB SHADOW E&M-EST. PATIENT-LVL III: CPT | Mod: PBBFAC,,, | Performed by: INTERNAL MEDICINE

## 2017-08-21 PROCEDURE — 3078F DIAST BP <80 MM HG: CPT | Mod: S$GLB,,, | Performed by: INTERNAL MEDICINE

## 2017-08-21 PROCEDURE — 99214 OFFICE O/P EST MOD 30 MIN: CPT | Mod: S$GLB,,, | Performed by: INTERNAL MEDICINE

## 2017-08-21 NOTE — PROGRESS NOTES
Reason for visit: Leukopenia    The patient is a 62-year-old  male who presents to the hematology oncology clinic today for follow up for leukopenia. He reports chronic fatigue. He denies any fever, chills or night sweats. He denies shortness of breath or chest pain. He denies any melena, hematemesis, or hematuria. Bowel movements are normal. Urination is normal. He denies any loss of appetite.    I have reviewed all of the patient's interval clinical history available in [facility]. He reports that was told recently that he had an abnormal hepatitis lab test when he went to donate blood. Dr. Belle is evaluating this.    PAST MEDICAL HISTORY:   1. Hypertension   2. Dyslipidemia  3. GERD   4. Anxiety/Depression  5. Migraine  6. Lumbar degenerative disc disease    SURGICAL HISTORY:   1. Scalp mass excision which was a lipoma    FAMILY HISTORY: Father  of complications related to a brain tumor at age 81. He denies any other immediate family members with cancer or bleeding/clotting disorders.     SOCIAL HISTORY: He does not smoke cigarettes. He does not drink alcohol. He has never used any recreational drugs. He works as an  and does airconditioning work as well. He is  and lives with his wife in [place]. He has 5 children.     ALLERGIES: Reviewed on medication card.     MEDICATIONS: [Medcard has been reviewed and/or reconciled.]     REVIEW OF SYSTEMS:   GENERAL: [No fevers, chills or sweats. No loss of appetite.] Reports fatigue.  HEENT: [No blurred vision, tinnitus, nasal discharge, sorethroat or dysphagia.]   HEART: [No chest pain, palpitations or shortness of breath.]   LUNGS: [No breathing problems.] He denies cough and hemoptysis.  ABDOMEN: [No abdominal pain, nausea, vomiting, diarrhea, constipation or melena.]   GENITOURINARY: [No bleeding or malodorous discharge.] No dysuria.  NEURO: [No headache, dizziness or vertigo.]   HEMATOLOGY: [No easy bruising, spontaneous bleeding or  blood clots in the past].   MUSCULOSKELETAL: Denies myalgias or arthralgias.  SKIN: [No rashes or skin lesions.]   PSYCHIATRY: [No depression or anxiety.]     PHYSICAL EXAMINATION:   VS: Reviewed on nurse's notes   APPEARANCE: The patient is a obese and well groomed  male who appears in no acute distress.  HEENT: No scleral icterus. No oral ulcers, lesions. Throat clear   HEAD: No sinus tenderness.  NECK: Supple. No palpable lymphadenopathy. Thyroid non-tender, no palpable masses.  CHEST: Clear to auscultation bilaterally. No rales. No rhonchi. Unlabored respirations.   CARDIOVASCULAR: Normal S1, S2. Normal rate. Regular rhythm.   ABDOMEN: Bowel sounds normal. No abdominal tenderness or distension. No hepatomegaly. No splenomegaly.   LYMPHATIC: No palpable supraclavicular, axillary nodes   EXTREMITIES: No clubbing, cyanosis. No edema in bilateral lower extremities.   SKIN: No lesions. No petechiae. No ecchymoses. No induration or nodules.   NEUROLOGIC: No focal findings. Alert & Oriented x 3. Mood appropriate to affect     LABS:   Reviewed     IMAGING:   Reviewed     IMPRESSION:   1. Normocytic anemia  2. Thrombocytopenia  3. Chronic leukopenia    PLAN:   1. I had a detailed discussion with the patient today about the results of his CBC. No significant anemia and thrombocytopenia noted today. His chronic leukopenia is stable. We will continue with close observation at this time. No urgent indication for any imaging or bone marrow biopsy at this time. He is clinically asymptomatic.  2.     Follow up in 6 months with CBC. He knows to call sooner for any new problems or questions.    Rikki Johnson MD

## 2017-08-22 ENCOUNTER — TELEPHONE (OUTPATIENT)
Dept: FAMILY MEDICINE | Facility: CLINIC | Age: 62
End: 2017-08-22

## 2017-08-22 ENCOUNTER — LAB VISIT (OUTPATIENT)
Dept: LAB | Facility: HOSPITAL | Age: 62
End: 2017-08-22
Attending: INTERNAL MEDICINE
Payer: COMMERCIAL

## 2017-08-22 DIAGNOSIS — E78.5 HYPERLIPIDEMIA, UNSPECIFIED HYPERLIPIDEMIA TYPE: Primary | ICD-10-CM

## 2017-08-22 DIAGNOSIS — D72.819 LEUKOPENIA, UNSPECIFIED TYPE: ICD-10-CM

## 2017-08-22 DIAGNOSIS — D64.9 NORMOCYTIC ANEMIA: ICD-10-CM

## 2017-08-22 DIAGNOSIS — D69.6 THROMBOCYTOPENIA: ICD-10-CM

## 2017-08-22 LAB
BASOPHILS # BLD AUTO: 0.02 K/UL
BASOPHILS NFR BLD: 0.4 %
DIFFERENTIAL METHOD: ABNORMAL
EOSINOPHIL # BLD AUTO: 0.1 K/UL
EOSINOPHIL NFR BLD: 1.3 %
ERYTHROCYTE [DISTWIDTH] IN BLOOD BY AUTOMATED COUNT: 14.6 %
HCT VFR BLD AUTO: 41.4 %
HGB BLD-MCNC: 13.8 G/DL
LYMPHOCYTES # BLD AUTO: 2 K/UL
LYMPHOCYTES NFR BLD: 42.8 %
MCH RBC QN AUTO: 29.7 PG
MCHC RBC AUTO-ENTMCNC: 33.3 G/DL
MCV RBC AUTO: 89 FL
MONOCYTES # BLD AUTO: 0.6 K/UL
MONOCYTES NFR BLD: 12.7 %
NEUTROPHILS # BLD AUTO: 2 K/UL
NEUTROPHILS NFR BLD: 42.8 %
PLATELET # BLD AUTO: 200 K/UL
PMV BLD AUTO: 10 FL
RBC # BLD AUTO: 4.64 M/UL
WBC # BLD AUTO: 4.56 K/UL

## 2017-08-22 PROCEDURE — 86704 HEP B CORE ANTIBODY TOTAL: CPT

## 2017-08-22 PROCEDURE — 36415 COLL VENOUS BLD VENIPUNCTURE: CPT | Mod: PO

## 2017-08-22 PROCEDURE — 85025 COMPLETE CBC W/AUTO DIFF WBC: CPT | Mod: PO

## 2017-08-22 RX ORDER — EZETIMIBE 10 MG/1
10 TABLET ORAL DAILY
Qty: 30 TABLET | Refills: 2 | Status: SHIPPED | OUTPATIENT
Start: 2017-08-22 | End: 2018-03-22 | Stop reason: SDUPTHER

## 2017-08-22 RX ORDER — ROSUVASTATIN CALCIUM 40 MG/1
40 TABLET, COATED ORAL NIGHTLY
Qty: 30 TABLET | Refills: 2 | Status: SHIPPED | OUTPATIENT
Start: 2017-08-22 | End: 2018-03-22 | Stop reason: SDUPTHER

## 2017-08-22 NOTE — PROGRESS NOTES
Let him know that the hepatitis panels were negative.  HOwever, I need a copy of the letter that he received from the blood bank.      The electrolytes all look good.      The ASCVD score is high with the cholesterol.    Change the pravastatin to crestor 40 mg a day #30 and rf x 2 and continue the zetia 10 mg a day #30 and rf x 2.   Recheck a lipid and liver in 3 months.

## 2017-08-22 NOTE — TELEPHONE ENCOUNTER
----- Message from Karel Belle MD sent at 8/21/2017  9:11 PM CDT -----  Let him know that the hepatitis panels were negative.  HOwever, I need a copy of the letter that he received from the blood bank.      The electrolytes all look good.      The ASCVD score is high with the cholesterol.    Change the pravastatin to crestor 40 mg a day #30 and rf x 2 and continue the zetia 10 mg a day #30 and rf x 2.   Recheck a lipid and liver in 3 months.

## 2017-08-23 ENCOUNTER — TELEPHONE (OUTPATIENT)
Dept: FAMILY MEDICINE | Facility: CLINIC | Age: 62
End: 2017-08-23

## 2017-08-23 LAB — HBV CORE AB SERPL QL IA: NEGATIVE

## 2017-08-26 RX ORDER — TRAMADOL HYDROCHLORIDE 50 MG/1
50 TABLET ORAL EVERY 6 HOURS PRN
Qty: 120 TABLET | Refills: 0 | Status: SHIPPED | OUTPATIENT
Start: 2017-08-26 | End: 2022-04-29 | Stop reason: ALTCHOICE

## 2017-08-26 NOTE — TELEPHONE ENCOUNTER
Book for a diabetic foot exam, eye exam     Health Maintenance Due   Topic Date Due    Foot Exam  03/21/2017    Eye Exam  07/06/2017    Influenza Vaccine  08/01/2017    Zoster Vaccine  08/06/2017

## 2017-10-02 ENCOUNTER — PATIENT OUTREACH (OUTPATIENT)
Dept: ADMINISTRATIVE | Facility: HOSPITAL | Age: 62
End: 2017-10-02

## 2017-10-06 ENCOUNTER — LAB VISIT (OUTPATIENT)
Dept: LAB | Facility: HOSPITAL | Age: 62
End: 2017-10-06
Attending: FAMILY MEDICINE
Payer: COMMERCIAL

## 2017-10-06 DIAGNOSIS — E11.9 DIABETES MELLITUS WITHOUT COMPLICATION: ICD-10-CM

## 2017-10-06 LAB
ESTIMATED AVG GLUCOSE: 126 MG/DL
HBA1C MFR BLD HPLC: 6 %

## 2017-10-06 PROCEDURE — 83036 HEMOGLOBIN GLYCOSYLATED A1C: CPT

## 2017-10-06 PROCEDURE — 36415 COLL VENOUS BLD VENIPUNCTURE: CPT | Mod: PO

## 2017-10-09 ENCOUNTER — PATIENT OUTREACH (OUTPATIENT)
Dept: ADMINISTRATIVE | Facility: HOSPITAL | Age: 62
End: 2017-10-09

## 2017-10-09 NOTE — LETTER
October 9, 2017    Marcos Marinelli  [address]  [place] [address]           [facility]  [address]  [facility] [address]  Phone: [phone] Dear Mr. Marinelli:    [facility] is committed to your overall health.  To help you get the most out of each of your visits, we will review your information to make sure you are up to date on all of your recommended tests and/or procedures.      Karel Belle MD has found that you may be due for   Health Maintenance Due   Topic    Foot Exam     Zoster Vaccine       If you have had any of the above done at another facility, please bring the records or information with you so that your record at [facility] will be complete.    If you are currently taking medication, please bring it with you to your appointment for review.    We will be happy to assist you with scheduling any necessary appointments or you may contact the [facility] appointment desk at [phone] to schedule at your convenience.   This information was sent to you via your patient portal.  Please check your message portal for important information.      Thank you for choosing [facility] for your healthcare needs,    If you have any questions or concerns, please don't hesitate to call.    Sincerely,    MANDO Thompson  Care Coordination Department  [facility]  [phone]

## 2017-10-09 NOTE — PROGRESS NOTES
Portal letter unread concerning overdue health maintenance. Pre visit chart audit and appt letter mailed.

## 2017-10-10 ENCOUNTER — TELEPHONE (OUTPATIENT)
Dept: FAMILY MEDICINE | Facility: CLINIC | Age: 62
End: 2017-10-10

## 2017-10-10 DIAGNOSIS — E11.49 TYPE 2 DIABETES MELLITUS WITH NEUROLOGICAL MANIFESTATIONS: Primary | ICD-10-CM

## 2017-10-10 NOTE — TELEPHONE ENCOUNTER
I have signed for the following orders AND/OR meds.  Please call the patient and ask the patient to schedule the testing AND/OR inform about any medications that were sent.      Orders Placed This Encounter   Procedures    Hemoglobin A1c     Standing Status:   Future     Standing Expiration Date:   12/9/2018

## 2017-10-10 NOTE — TELEPHONE ENCOUNTER
----- Message from Jermaine Vides sent at 10/10/2017  2:26 PM CDT -----  Contact: Pt   Pt returning a phone call..[phone] (home)

## 2017-10-10 NOTE — TELEPHONE ENCOUNTER
----- Message from Karel Belle MD sent at 10/7/2017 10:00 AM CDT -----  The a1c is fine.  Recheck in 6 months.

## 2017-10-16 ENCOUNTER — TELEPHONE (OUTPATIENT)
Dept: FAMILY MEDICINE | Facility: CLINIC | Age: 62
End: 2017-10-16

## 2017-10-16 ENCOUNTER — HOSPITAL ENCOUNTER (OUTPATIENT)
Dept: RADIOLOGY | Facility: HOSPITAL | Age: 62
Discharge: HOME | End: 2017-10-16
Attending: NURSE PRACTITIONER
Payer: COMMERCIAL

## 2017-10-16 ENCOUNTER — OFFICE VISIT (OUTPATIENT)
Dept: FAMILY MEDICINE | Facility: CLINIC | Age: 62
End: 2017-10-16
Payer: COMMERCIAL

## 2017-10-16 VITALS
BODY MASS INDEX: 38.97 KG/M2 | HEART RATE: 68 BPM | WEIGHT: 294 LBS | TEMPERATURE: 98 F | DIASTOLIC BLOOD PRESSURE: 81 MMHG | SYSTOLIC BLOOD PRESSURE: 130 MMHG | HEIGHT: 73 IN

## 2017-10-16 DIAGNOSIS — I83.90 VARICOSE VEIN OF LEG: ICD-10-CM

## 2017-10-16 DIAGNOSIS — M79.604 RIGHT LEG PAIN: Primary | ICD-10-CM

## 2017-10-16 DIAGNOSIS — M25.561 ACUTE PAIN OF RIGHT KNEE: ICD-10-CM

## 2017-10-16 DIAGNOSIS — M79.604 RIGHT LEG PAIN: ICD-10-CM

## 2017-10-16 DIAGNOSIS — M25.571 ACUTE RIGHT ANKLE PAIN: ICD-10-CM

## 2017-10-16 DIAGNOSIS — M25.569 KNEE PAIN, UNSPECIFIED CHRONICITY, UNSPECIFIED LATERALITY: Primary | ICD-10-CM

## 2017-10-16 PROCEDURE — 73610 X-RAY EXAM OF ANKLE: CPT | Mod: TC,PO,RT

## 2017-10-16 PROCEDURE — 73562 X-RAY EXAM OF KNEE 3: CPT | Mod: 26,RT,, | Performed by: RADIOLOGY

## 2017-10-16 PROCEDURE — 93971 EXTREMITY STUDY: CPT | Mod: 26,,, | Performed by: RADIOLOGY

## 2017-10-16 PROCEDURE — 73610 X-RAY EXAM OF ANKLE: CPT | Mod: 26,RT,, | Performed by: RADIOLOGY

## 2017-10-16 PROCEDURE — 99213 OFFICE O/P EST LOW 20 MIN: CPT | Mod: S$GLB,,, | Performed by: NURSE PRACTITIONER

## 2017-10-16 PROCEDURE — 73560 X-RAY EXAM OF KNEE 1 OR 2: CPT | Mod: TC,PO,LT

## 2017-10-16 PROCEDURE — 93971 EXTREMITY STUDY: CPT | Mod: TC,PO

## 2017-10-16 PROCEDURE — 99999 PR PBB SHADOW E&M-EST. PATIENT-LVL V: CPT | Mod: PBBFAC,,, | Performed by: NURSE PRACTITIONER

## 2017-10-16 PROCEDURE — 73560 X-RAY EXAM OF KNEE 1 OR 2: CPT | Mod: 26,LT,, | Performed by: RADIOLOGY

## 2017-10-16 NOTE — PROGRESS NOTES
"Subjective:       Patient ID: Marcos Marinelli is a 62 y.o. male.    Chief Complaint: Knee Pain (rt)    Leg Pain    The incident occurred 6 to 12 hours ago. There was no injury mechanism. The pain is present in the right ankle, right knee and right leg. The quality of the pain is described as aching (tenderness). The pain is mild. The pain has been intermittent since onset. Pertinent negatives include no inability to bear weight, loss of motion, loss of sensation, muscle weakness, numbness or tingling. He reports no foreign bodies present. The symptoms are aggravated by palpation and movement. Treatments tried: Pt states YESIKA hose helped; states veins "popped out" today on affected leg. The treatment provided moderate (States pain returned when took of YESIKA hose) relief.     Past Medical History:   Diagnosis Date    Anxiety     Arthritis     carpal tunnel    Cataract     Colon polyp 11/13/2012    repeat colonosopy in 5 years.    Combined hyperlipidemia associated with type 2 diabetes mellitus 2/28/2015    ED (erectile dysfunction)     Environmental allergies     GERD (gastroesophageal reflux disease)     Hiatal hernia 11/13/2012    found on EGD    Hyperglycemia     Hyperlipidemia LDL goal < 130     Hypertension goal BP (blood pressure) < 140/90     Insomnia     Strabismus     od as child//    Torn meniscus w-2    right knee    Tremor     controlled with xanax     Social History     Social History    Marital status:      Spouse name: N/A    Number of children: N/A    Years of education: N/A     Occupational History         Social History Main Topics    Smoking status: Never Smoker    Smokeless tobacco: Never Used    Alcohol use No    Drug use: No    Sexual activity: Yes     Partners: Female     Past Surgical History:   Procedure Laterality Date    ESOPHAGOGASTRODUODENOSCOPY  11/13/2012    SKIN LESION EXCISION      removed from head       Review of Systems   Constitutional: Negative.  "   HENT: Negative.    Eyes: Negative.    Respiratory: Negative.    Cardiovascular: Negative.    Gastrointestinal: Negative.    Endocrine: Negative.    Genitourinary: Negative.    Musculoskeletal:        Right knee, ankle, leg pain   Skin: Negative.    Allergic/Immunologic: Negative.    Neurological: Negative.  Negative for tingling and numbness.   Psychiatric/Behavioral: Negative.        Objective:      Physical Exam   Constitutional: He is oriented to person, place, and time. He appears well-developed and well-nourished.   HENT:   Head: Normocephalic.   Right Ear: External ear normal.   Left Ear: External ear normal.   Nose: Nose normal.   Mouth/Throat: Oropharynx is clear and moist.   Eyes: Conjunctivae are normal. Pupils are equal, round, and reactive to light.   Neck: Normal range of motion. Neck supple.   Cardiovascular: Normal rate, regular rhythm and normal heart sounds.    Pulmonary/Chest: Effort normal and breath sounds normal.   Abdominal: Soft. Bowel sounds are normal.   Musculoskeletal: Normal range of motion.        Right knee: He exhibits bony tenderness. He exhibits normal range of motion, no swelling, no effusion, no ecchymosis, no deformity, no laceration, no erythema, normal alignment, no LCL laxity, normal patellar mobility, normal meniscus and no MCL laxity. No tenderness found.        Right ankle: He exhibits normal range of motion, no swelling, no ecchymosis, no deformity, no laceration and normal pulse. Tenderness.        Right lower leg: He exhibits swelling (No increased warmth to touch or erythema noted). He exhibits no tenderness, no bony tenderness, no edema, no deformity and no laceration.        Legs:       Feet:    Neurological: He is alert and oriented to person, place, and time.   Skin: Skin is warm and dry. Capillary refill takes 2 to 3 seconds.   Psychiatric: He has a normal mood and affect. His behavior is normal. Judgment and thought content normal.   Nursing note and vitals  reviewed.      Assessment:       1. Right leg pain    2. Varicose vein of leg    3. Acute pain of right knee    4. Acute right ankle pain        Plan:           Marcos was seen today for knee pain.    Diagnoses and all orders for this visit:    Right leg pain  Varicose vein of leg  Acute pain of right knee  Acute right ankle pain  -     US Lower Extremity Veins Right; Future  -     Ambulatory Referral to Cardiovascular Surgery  -     X-ray Knee Ortho Right; Future  -     X-Ray Ankle Complete Right; Future        Report to ER immediately if symptoms worsen

## 2017-10-17 ENCOUNTER — TELEPHONE (OUTPATIENT)
Dept: VASCULAR SURGERY | Facility: CLINIC | Age: 62
End: 2017-10-17

## 2017-10-17 NOTE — TELEPHONE ENCOUNTER
Informed pt that Dr. Jauregui does not treat varicose veins. Pt to call if wants to schedule appt with Dr. Antunez.

## 2017-10-18 NOTE — PROGRESS NOTES
Subjective:       Patient ID: Marcos Marinelli is a 62 y.o. male.    Chief Complaint: Follow-up    Leg Pain    The incident occurred more than 1 week ago. There was no injury mechanism. The pain is present in the right leg. The quality of the pain is described as aching. The pain is moderate. The pain has been fluctuating since onset. The symptoms are aggravated by weight bearing. He has tried elevation and NSAIDs for the symptoms. The treatment provided mild relief.   Diabetes   He presents for his follow-up diabetic visit. He has type 2 diabetes mellitus. His disease course has been stable. There are no hypoglycemic associated symptoms. Pertinent negatives for hypoglycemia include no dizziness, headaches or nervousness/anxiousness. Pertinent negatives for diabetes include no chest pain and no fatigue. There are no hypoglycemic complications. Risk factors for coronary artery disease include male sex, obesity, hypertension, family history, dyslipidemia and diabetes mellitus. When asked about current treatments, none were reported. An ACE inhibitor/angiotensin II receptor blocker is being taken. He does not see a podiatrist.      Review of Systems   Constitutional: Negative for fatigue, fever and unexpected weight change.   HENT: Negative for ear pain and sore throat.    Eyes: Negative.  Negative for pain and visual disturbance.   Respiratory: Negative for cough and shortness of breath.    Cardiovascular: Negative for chest pain and palpitations.   Gastrointestinal: Negative for abdominal pain, diarrhea, nausea and vomiting.   Genitourinary: Negative for dysuria and frequency.   Musculoskeletal: Positive for arthralgias, gait problem and myalgias (right leg).   Skin: Negative for color change and rash.   Neurological: Negative for dizziness and headaches.   Psychiatric/Behavioral: Negative for sleep disturbance. The patient is not nervous/anxious.        Vitals:    10/19/17 1101   BP: 120/75   Pulse: 81   Temp:  98.2 °F (36.8 °C)       Objective:     Current Outpatient Prescriptions   Medication Sig Dispense Refill    alprazolam (XANAX) 1 MG tablet TAKE 1/2 TO 1 TABLET EVERY 6 TO 8 HOURS AS NEEDED 60 tablet 5    benazepril-hydrochlorthiazide (LOTENSIN HCT) 20-12.5 mg per tablet TAKE 1 TABLET TWICE DAILY 180 tablet 3    cloNIDine (CATAPRES) 0.1 MG tablet TAKE 1 TABLET (0.1 MG TOTAL) BY MOUTH EVERY EVENING. 90 tablet 3    diclofenac (VOLTAREN) 75 MG EC tablet Take 1 tablet (75 mg total) by mouth 2 (two) times daily. 60 tablet 5    ezetimibe (ZETIA) 10 mg tablet Take 1 tablet (10 mg total) by mouth once daily. 30 tablet 2    fexofenadine (ALLEGRA) 180 MG tablet Take 1 tablet (180 mg total) by mouth once daily. 30 tablet 0    lansoprazole (PREVACID) 30 MG capsule Take 1 capsule (30 mg total) by mouth once daily. 90 capsule 3    rosuvastatin (CRESTOR) 40 MG Tab Take 1 tablet (40 mg total) by mouth every evening. 30 tablet 2    tramadol (ULTRAM) 50 mg tablet TAKE 1 TABLET (50 MG TOTAL) BY MOUTH EVERY 6 (SIX) HOURS AS NEEDED FOR PAIN. 120 tablet 0     No current facility-administered medications for this visit.        Physical Exam   Constitutional: He is oriented to person, place, and time. He appears well-developed. No distress.   HENT:   Head: Normocephalic and atraumatic.   Eyes: EOM are normal. Pupils are equal, round, and reactive to light.   Neck: Normal range of motion. Neck supple.   Cardiovascular: Normal rate and regular rhythm.    Pulses:       Dorsalis pedis pulses are 2+ on the right side, and 2+ on the left side.   2+ bilateral lower extremity edema   Pulmonary/Chest: Effort normal and breath sounds normal.   Musculoskeletal: Normal range of motion. He exhibits edema (bilateral lower extremities with 2+ edema, right lower leg with large varicosities).        Right foot: There is no deformity.        Left foot: There is no deformity.   Feet:   Right Foot:   Protective Sensation: 4 sites tested. 4 sites  sensed.   Skin Integrity: Negative for blister, skin breakdown, erythema or callus.   Left Foot:   Protective Sensation: 4 sites tested. 4 sites sensed.   Skin Integrity: Negative for blister, skin breakdown, erythema or callus.   Neurological: He is alert and oriented to person, place, and time.   Skin: Skin is warm and dry. No rash noted.   Psychiatric: He has a normal mood and affect. Thought content normal.   Nursing note and vitals reviewed.      Assessment:       1. Varicose vein of leg    2. Right leg pain    3. Type 2 diabetes mellitus with neurological manifestations        Plan:   Varicose vein of leg  -     Ambulatory referral to Vascular Surgery    Right leg pain    Type 2 diabetes mellitus with neurological manifestations    planning on scheduling diabetic eye exam    Return for Routine scheduled appointment.

## 2017-10-19 ENCOUNTER — OFFICE VISIT (OUTPATIENT)
Dept: FAMILY MEDICINE | Facility: CLINIC | Age: 62
End: 2017-10-19
Payer: COMMERCIAL

## 2017-10-19 VITALS
WEIGHT: 300.19 LBS | HEART RATE: 81 BPM | SYSTOLIC BLOOD PRESSURE: 120 MMHG | HEIGHT: 73 IN | DIASTOLIC BLOOD PRESSURE: 75 MMHG | TEMPERATURE: 98 F | BODY MASS INDEX: 39.78 KG/M2

## 2017-10-19 DIAGNOSIS — M79.604 RIGHT LEG PAIN: ICD-10-CM

## 2017-10-19 DIAGNOSIS — I83.90 VARICOSE VEIN OF LEG: Primary | ICD-10-CM

## 2017-10-19 DIAGNOSIS — E11.49 TYPE 2 DIABETES MELLITUS WITH NEUROLOGICAL MANIFESTATIONS: ICD-10-CM

## 2017-10-19 PROCEDURE — 99214 OFFICE O/P EST MOD 30 MIN: CPT | Mod: S$GLB,,, | Performed by: NURSE PRACTITIONER

## 2017-10-19 PROCEDURE — 99999 PR PBB SHADOW E&M-EST. PATIENT-LVL III: CPT | Mod: PBBFAC,,, | Performed by: NURSE PRACTITIONER

## 2017-10-23 ENCOUNTER — TELEPHONE (OUTPATIENT)
Dept: FAMILY MEDICINE | Facility: CLINIC | Age: 62
End: 2017-10-23

## 2017-10-23 DIAGNOSIS — R76.8 HEPATITIS C ANTIBODY TEST POSITIVE: Primary | ICD-10-CM

## 2017-10-23 NOTE — TELEPHONE ENCOUNTER
He tested positive for hepatitis C virus with anti-hcv.  Based on this, we need a confirmatory test.  He had this at the blood center.  Call and tell him we need more blood.    Orders Placed This Encounter   Procedures    HEPATITIS C RNA, QUANTITATIVE, PCR     Standing Status:   Future     Standing Expiration Date:   12/22/2018

## 2017-10-24 ENCOUNTER — LAB VISIT (OUTPATIENT)
Dept: LAB | Facility: HOSPITAL | Age: 62
End: 2017-10-24
Attending: FAMILY MEDICINE
Payer: COMMERCIAL

## 2017-10-24 DIAGNOSIS — R76.8 HEPATITIS C ANTIBODY TEST POSITIVE: ICD-10-CM

## 2017-10-24 PROCEDURE — 87522 HEPATITIS C REVRS TRNSCRPJ: CPT

## 2017-10-24 PROCEDURE — 36415 COLL VENOUS BLD VENIPUNCTURE: CPT | Mod: PO

## 2017-10-27 LAB
HCV LOG: <1.08 LOG (10) IU/ML
HCV RNA QUANT PCR: <12 IU/ML
HCV, QUALITATIVE: NOT DETECTED IU/ML

## 2017-10-29 NOTE — PROGRESS NOTES
The confirmatory test for hepatitis C is NEGATIVE which is great.  This means that there is no sign of hepatitis C in the blood stream.      Dr. Vinson

## 2017-12-01 ENCOUNTER — LAB VISIT (OUTPATIENT)
Dept: LAB | Facility: HOSPITAL | Age: 62
End: 2017-12-01
Attending: FAMILY MEDICINE
Payer: COMMERCIAL

## 2017-12-01 DIAGNOSIS — E78.5 HYPERLIPIDEMIA, UNSPECIFIED HYPERLIPIDEMIA TYPE: ICD-10-CM

## 2017-12-01 LAB
ALBUMIN SERPL BCP-MCNC: 3.5 G/DL
ALP SERPL-CCNC: 65 U/L
ALT SERPL W/O P-5'-P-CCNC: 18 U/L
AST SERPL-CCNC: 21 U/L
BILIRUB DIRECT SERPL-MCNC: 0.3 MG/DL
BILIRUB SERPL-MCNC: 0.5 MG/DL
CHOLEST SERPL-MCNC: 100 MG/DL
CHOLEST/HDLC SERPL: 2.4 {RATIO}
HDLC SERPL-MCNC: 41 MG/DL
HDLC SERPL: 41 %
LDLC SERPL CALC-MCNC: 50.6 MG/DL
NONHDLC SERPL-MCNC: 59 MG/DL
PROT SERPL-MCNC: 6.6 G/DL
TRIGL SERPL-MCNC: 42 MG/DL

## 2017-12-01 PROCEDURE — 80076 HEPATIC FUNCTION PANEL: CPT

## 2017-12-01 PROCEDURE — 80061 LIPID PANEL: CPT

## 2017-12-01 PROCEDURE — 36415 COLL VENOUS BLD VENIPUNCTURE: CPT | Mod: PO

## 2018-01-16 DIAGNOSIS — I15.2 HYPERTENSION ASSOCIATED WITH DIABETES: ICD-10-CM

## 2018-01-16 DIAGNOSIS — E11.59 HYPERTENSION ASSOCIATED WITH DIABETES: ICD-10-CM

## 2018-01-16 RX ORDER — CLONIDINE HYDROCHLORIDE 0.1 MG/1
TABLET ORAL
Qty: 90 TABLET | Refills: 3 | Status: SHIPPED | OUTPATIENT
Start: 2018-01-16 | End: 2018-04-13 | Stop reason: SDUPTHER

## 2018-01-25 RX ORDER — DICLOFENAC SODIUM 75 MG/1
75 TABLET, DELAYED RELEASE ORAL 2 TIMES DAILY
Qty: 60 TABLET | Refills: 0 | Status: SHIPPED | OUTPATIENT
Start: 2018-01-25 | End: 2018-03-22 | Stop reason: SDUPTHER

## 2018-02-05 ENCOUNTER — TELEPHONE (OUTPATIENT)
Dept: HEMATOLOGY/ONCOLOGY | Facility: CLINIC | Age: 63
End: 2018-02-05

## 2018-03-22 DIAGNOSIS — F41.9 ANXIETY: ICD-10-CM

## 2018-03-22 DIAGNOSIS — E78.5 HYPERLIPIDEMIA, UNSPECIFIED HYPERLIPIDEMIA TYPE: ICD-10-CM

## 2018-03-22 RX ORDER — EZETIMIBE 10 MG/1
10 TABLET ORAL DAILY
Qty: 30 TABLET | Refills: 8 | Status: SHIPPED | OUTPATIENT
Start: 2018-03-22 | End: 2022-04-29

## 2018-03-22 RX ORDER — ALPRAZOLAM 1 MG/1
TABLET ORAL
Qty: 60 TABLET | OUTPATIENT
Start: 2018-03-22

## 2018-03-22 RX ORDER — DICLOFENAC SODIUM 75 MG/1
TABLET, DELAYED RELEASE ORAL
Qty: 60 TABLET | Refills: 0 | Status: SHIPPED | OUTPATIENT
Start: 2018-03-22 | End: 2018-03-23 | Stop reason: SDUPTHER

## 2018-03-22 RX ORDER — ROSUVASTATIN CALCIUM 40 MG/1
40 TABLET, COATED ORAL NIGHTLY
Qty: 30 TABLET | Refills: 0 | Status: SHIPPED | OUTPATIENT
Start: 2018-03-22 | End: 2018-04-24 | Stop reason: SDUPTHER

## 2018-03-22 RX ORDER — ROSUVASTATIN CALCIUM 40 MG/1
40 TABLET, COATED ORAL NIGHTLY
Qty: 30 TABLET | Refills: 0 | Status: SHIPPED | OUTPATIENT
Start: 2018-03-22 | End: 2018-03-22 | Stop reason: SDUPTHER

## 2018-03-22 NOTE — TELEPHONE ENCOUNTER
The xanax was denied as this requires a face to face viisit to get because it is a narcotic.    Lab Results   Component Value Date    CHOL 100 (L) 12/01/2017    CHOL 181 08/18/2017    CHOL 162 01/17/2017     Lab Results   Component Value Date    HDL 41 12/01/2017    HDL 51 08/18/2017    HDL 42 01/17/2017     Lab Results   Component Value Date    LDLCALC 50.6 (L) 12/01/2017    LDLCALC 118.0 08/18/2017    LDLCALC 109.0 01/17/2017     Lab Results   Component Value Date    TRIG 42 12/01/2017    TRIG 60 08/18/2017    TRIG 55 01/17/2017     Lab Results   Component Value Date    CHOLHDL 41.0 12/01/2017    CHOLHDL 28.2 08/18/2017    CHOLHDL 25.9 01/17/2017

## 2018-03-23 RX ORDER — DICLOFENAC SODIUM 75 MG/1
TABLET, DELAYED RELEASE ORAL
Qty: 60 TABLET | Refills: 0 | Status: SHIPPED | OUTPATIENT
Start: 2018-03-23 | End: 2018-04-19 | Stop reason: SDUPTHER

## 2018-04-04 ENCOUNTER — PATIENT OUTREACH (OUTPATIENT)
Dept: ADMINISTRATIVE | Facility: HOSPITAL | Age: 63
End: 2018-04-04

## 2018-04-04 NOTE — LETTER
April 4, 2018    Marcos Marinelli  [address] [facility] [address]           [facility]  [address]  [facility] [address]  Phone: [phone] Dear Mr. Marinelli:    [facility] is committed to your overall health.  To help you get the most out of each of your visits, we will review your information to make sure you are up to date on all of your recommended tests and/or procedures.      Karel Belle MD has found that you may be due for   Health Maintenance Due   Topic    Eye Exam     Zoster Vaccine     Colonoscopy     Dc call me concerning your eye exam.     If you have had any of the above done at another facility, please bring the records or information with you so that your record at [facility] will be complete.    If you are currently taking medication, please bring it with you to your appointment for review.    We will be happy to assist you with scheduling any necessary appointments or you may contact the [facility] appointment desk at [phone] to schedule at your convenience.   Thank you for choosing [facility] for your healthcare needs,    If you have any questions or concerns, please don't hesitate to call.    Sincerely,    MANDO Thompson  Care Coordination Department  [facility]  [phone]

## 2018-04-10 ENCOUNTER — LAB VISIT (OUTPATIENT)
Dept: LAB | Facility: HOSPITAL | Age: 63
End: 2018-04-10
Attending: FAMILY MEDICINE
Payer: COMMERCIAL

## 2018-04-10 DIAGNOSIS — E11.49 TYPE 2 DIABETES MELLITUS WITH NEUROLOGICAL MANIFESTATIONS: ICD-10-CM

## 2018-04-10 LAB
ESTIMATED AVG GLUCOSE: 120 MG/DL
HBA1C MFR BLD HPLC: 5.8 %

## 2018-04-10 PROCEDURE — 83036 HEMOGLOBIN GLYCOSYLATED A1C: CPT

## 2018-04-10 PROCEDURE — 36415 COLL VENOUS BLD VENIPUNCTURE: CPT | Mod: PO

## 2018-04-12 ENCOUNTER — TELEPHONE (OUTPATIENT)
Dept: FAMILY MEDICINE | Facility: CLINIC | Age: 63
End: 2018-04-12

## 2018-04-12 DIAGNOSIS — E11.9 TYPE 2 DIABETES MELLITUS WITHOUT COMPLICATION, WITHOUT LONG-TERM CURRENT USE OF INSULIN: Primary | ICD-10-CM

## 2018-04-12 NOTE — TELEPHONE ENCOUNTER
I have signed for the following orders AND/OR meds.  Please call the patient and ask the patient to schedule the testing AND/OR inform about any medications that were sent.      Orders Placed This Encounter   Procedures    Hemoglobin A1c     Standing Status:   Standing     Number of Occurrences:   99     Standing Expiration Date:   4/12/2038

## 2018-04-12 NOTE — TELEPHONE ENCOUNTER
----- Message from Karel Belle MD sent at 4/10/2018  6:28 PM CDT -----  Book a1c in 6 months.  Notify of the appointment via MyChart or letter.   Refill Diabetes Meds x 6 months.    The patient's Health Maintenance that is due is below:  Eye Exam due on 07/06/2017  Zoster Vaccine due on 08/06/2017  Colonoscopy due on 11/13/2017

## 2018-04-13 DIAGNOSIS — I15.2 HYPERTENSION ASSOCIATED WITH DIABETES: ICD-10-CM

## 2018-04-13 DIAGNOSIS — E11.59 HYPERTENSION ASSOCIATED WITH DIABETES: ICD-10-CM

## 2018-04-13 DIAGNOSIS — K21.9 GASTROESOPHAGEAL REFLUX DISEASE WITHOUT ESOPHAGITIS: ICD-10-CM

## 2018-04-13 RX ORDER — CLONIDINE HYDROCHLORIDE 0.1 MG/1
TABLET ORAL
Qty: 90 TABLET | Refills: 0 | Status: SHIPPED | OUTPATIENT
Start: 2018-04-13 | End: 2018-08-19 | Stop reason: SDUPTHER

## 2018-04-13 RX ORDER — LANSOPRAZOLE 30 MG/1
30 CAPSULE, DELAYED RELEASE ORAL DAILY
Qty: 90 CAPSULE | Refills: 1 | Status: SHIPPED | OUTPATIENT
Start: 2018-04-13 | End: 2018-10-30 | Stop reason: SDUPTHER

## 2018-04-18 ENCOUNTER — OFFICE VISIT (OUTPATIENT)
Dept: FAMILY MEDICINE | Facility: CLINIC | Age: 63
End: 2018-04-18
Payer: COMMERCIAL

## 2018-04-18 ENCOUNTER — LAB VISIT (OUTPATIENT)
Dept: LAB | Facility: HOSPITAL | Age: 63
End: 2018-04-18
Attending: INTERNAL MEDICINE
Payer: COMMERCIAL

## 2018-04-18 ENCOUNTER — OFFICE VISIT (OUTPATIENT)
Dept: HEMATOLOGY/ONCOLOGY | Facility: CLINIC | Age: 63
End: 2018-04-18
Payer: COMMERCIAL

## 2018-04-18 VITALS
SYSTOLIC BLOOD PRESSURE: 123 MMHG | TEMPERATURE: 98 F | DIASTOLIC BLOOD PRESSURE: 79 MMHG | WEIGHT: 301.56 LBS | HEART RATE: 89 BPM | OXYGEN SATURATION: 98 % | BODY MASS INDEX: 39.97 KG/M2 | HEIGHT: 73 IN

## 2018-04-18 VITALS
WEIGHT: 301.56 LBS | HEIGHT: 73 IN | DIASTOLIC BLOOD PRESSURE: 88 MMHG | HEART RATE: 81 BPM | TEMPERATURE: 98 F | SYSTOLIC BLOOD PRESSURE: 133 MMHG | BODY MASS INDEX: 39.97 KG/M2

## 2018-04-18 DIAGNOSIS — D64.9 NORMOCYTIC ANEMIA: ICD-10-CM

## 2018-04-18 DIAGNOSIS — E11.49 TYPE 2 DIABETES MELLITUS WITH NEUROLOGICAL MANIFESTATIONS: ICD-10-CM

## 2018-04-18 DIAGNOSIS — D72.819 LEUKOPENIA, UNSPECIFIED TYPE: Primary | ICD-10-CM

## 2018-04-18 DIAGNOSIS — Z86.010 HISTORY OF COLON POLYPS: ICD-10-CM

## 2018-04-18 DIAGNOSIS — D72.819 LEUKOPENIA, UNSPECIFIED TYPE: ICD-10-CM

## 2018-04-18 DIAGNOSIS — D69.6 THROMBOCYTOPENIA: ICD-10-CM

## 2018-04-18 DIAGNOSIS — F41.9 ANXIETY: ICD-10-CM

## 2018-04-18 PROBLEM — Z86.0100 HISTORY OF COLON POLYPS: Status: ACTIVE | Noted: 2018-04-18

## 2018-04-18 LAB
BASOPHILS # BLD AUTO: 0.01 K/UL
BASOPHILS NFR BLD: 0.3 %
DIFFERENTIAL METHOD: ABNORMAL
EOSINOPHIL # BLD AUTO: 0.1 K/UL
EOSINOPHIL NFR BLD: 3.3 %
ERYTHROCYTE [DISTWIDTH] IN BLOOD BY AUTOMATED COUNT: 14.8 %
HCT VFR BLD AUTO: 42.1 %
HGB BLD-MCNC: 13.6 G/DL
LYMPHOCYTES # BLD AUTO: 1.8 K/UL
LYMPHOCYTES NFR BLD: 48.2 %
MCH RBC QN AUTO: 28.8 PG
MCHC RBC AUTO-ENTMCNC: 32.3 G/DL
MCV RBC AUTO: 89 FL
MONOCYTES # BLD AUTO: 0.4 K/UL
MONOCYTES NFR BLD: 12 %
NEUTROPHILS # BLD AUTO: 1.3 K/UL
NEUTROPHILS NFR BLD: 36.2 %
PLATELET # BLD AUTO: 181 K/UL
PMV BLD AUTO: 9.9 FL
RBC # BLD AUTO: 4.72 M/UL
WBC # BLD AUTO: 3.67 K/UL

## 2018-04-18 PROCEDURE — 3079F DIAST BP 80-89 MM HG: CPT | Mod: CPTII,S$GLB,, | Performed by: FAMILY MEDICINE

## 2018-04-18 PROCEDURE — 99214 OFFICE O/P EST MOD 30 MIN: CPT | Mod: S$GLB,,, | Performed by: INTERNAL MEDICINE

## 2018-04-18 PROCEDURE — 3078F DIAST BP <80 MM HG: CPT | Mod: CPTII,S$GLB,, | Performed by: INTERNAL MEDICINE

## 2018-04-18 PROCEDURE — 3075F SYST BP GE 130 - 139MM HG: CPT | Mod: CPTII,S$GLB,, | Performed by: FAMILY MEDICINE

## 2018-04-18 PROCEDURE — 99999 PR PBB SHADOW E&M-EST. PATIENT-LVL III: CPT | Mod: PBBFAC,,, | Performed by: INTERNAL MEDICINE

## 2018-04-18 PROCEDURE — 85025 COMPLETE CBC W/AUTO DIFF WBC: CPT | Mod: PO

## 2018-04-18 PROCEDURE — 36415 COLL VENOUS BLD VENIPUNCTURE: CPT | Mod: PO

## 2018-04-18 PROCEDURE — 99213 OFFICE O/P EST LOW 20 MIN: CPT | Mod: S$GLB,,, | Performed by: FAMILY MEDICINE

## 2018-04-18 PROCEDURE — 3074F SYST BP LT 130 MM HG: CPT | Mod: CPTII,S$GLB,, | Performed by: INTERNAL MEDICINE

## 2018-04-18 PROCEDURE — 3044F HG A1C LEVEL LT 7.0%: CPT | Mod: CPTII,S$GLB,, | Performed by: FAMILY MEDICINE

## 2018-04-18 PROCEDURE — 99999 PR PBB SHADOW E&M-EST. PATIENT-LVL III: CPT | Mod: PBBFAC,,, | Performed by: FAMILY MEDICINE

## 2018-04-18 RX ORDER — PROMETHAZINE HYDROCHLORIDE 25 MG/1
25 TABLET ORAL EVERY 6 HOURS PRN
Qty: 6 TABLET | Refills: 0 | Status: SHIPPED | OUTPATIENT
Start: 2018-04-18 | End: 2022-04-29

## 2018-04-18 RX ORDER — SODIUM, POTASSIUM,MAG SULFATES 17.5-3.13G
SOLUTION, RECONSTITUTED, ORAL ORAL
Qty: 354 ML | Refills: 0 | Status: SHIPPED | OUTPATIENT
Start: 2018-04-18 | End: 2022-04-29

## 2018-04-18 RX ORDER — ALPRAZOLAM 0.5 MG/1
0.5 TABLET ORAL NIGHTLY PRN
Qty: 30 TABLET | Refills: 5 | Status: SHIPPED | OUTPATIENT
Start: 2018-04-18 | End: 2018-10-30 | Stop reason: SDUPTHER

## 2018-04-18 NOTE — PROGRESS NOTES
Subjective:      Patient ID: Marcos Marinelli is a 62 y.o. male.    Chief Complaint: Medication Refill    Problem List Items Addressed This Visit     Anxiety    Overview     yvivian has anxiety and this cuases him problems sleeping.  He had been getting the 1 mg tabs but he has to cut them in 1/2. We discussed using a 0.5 mg dose and he is ok with that.  He has had a problem with someone taking his meds if he has too much so he requests to just get it a month at the time so he can keep up with them.          History of colon polyps    Overview     He has a history of colon polyps and he had his last one done in 2012 and he is overdue for a recheck. He is ready to do this but not til Vani.           Type 2 diabetes mellitus with neurological manifestations    Overview     The patient presents with diabetes.  The patient denies polyuria, polydipsia, polyphagia, hypoglycemia and paresthesias.  The patient's glucose control has been good.  Home glucose averages are routinely checked.  The patient is without retinopathy currently.  The patient has a history of neuropathy.  The patient currently complains of no podiatric problems.  The patient has excellent compliance.  Hemoglobin A1C   Date Value Ref Range Status   04/10/2018 5.8 (H) 4.0 - 5.6 % Final     Comment:     According to ADA guidelines, hemoglobin A1c <7.0% represents  optimal control in non-pregnant diabetic patients. Different  metrics may apply to specific patient populations.   Standards of Medical Care in Diabetes-2016.  For the purpose of screening for the presence of diabetes:  <5.7%     Consistent with the absence of diabetes  5.7-6.4%  Consistent with increasing risk for diabetes   (prediabetes)  >or=6.5%  Consistent with diabetes  Currently, no consensus exists for use of hemoglobin A1c  for diagnosis of diabetes for children.  This Hemoglobin A1c assay has significant interference with fetal   hemoglobin   (HbF). The results are invalid for patients  with abnormal amounts of   HbF,   including those with known Hereditary Persistence   of Fetal Hemoglobin. Heterozygous hemoglobin variants (HbAS, HbAC,   HbAD, HbAE, HbA2) do not significantly interfere with this assay;   however, presence of multiple variants in a sample may impact the %   interference.     10/06/2017 6.0 (H) 4.0 - 5.6 % Final     Comment:     According to ADA guidelines, hemoglobin A1c <7.0% represents  optimal control in non-pregnant diabetic patients. Different  metrics may apply to specific patient populations.   Standards of Medical Care in Diabetes-2016.  For the purpose of screening for the presence of diabetes:  <5.7%     Consistent with the absence of diabetes  5.7-6.4%  Consistent with increasing risk for diabetes   (prediabetes)  >or=6.5%  Consistent with diabetes  Currently, no consensus exists for use of hemoglobin A1c  for diagnosis of diabetes for children.  This Hemoglobin A1c assay has significant interference with fetal   hemoglobin   (HbF). The results are invalid for patients with abnormal amounts of   HbF,   including those with known Hereditary Persistence   of Fetal Hemoglobin. Heterozygous hemoglobin variants (HbAS, HbAC,   HbAD, HbAE, HbA2) do not significantly interfere with this assay;   however, presence of multiple variants in a sample may impact the %   interference.     04/05/2017 6.1 4.5 - 6.2 % Final     Comment:     According to ADA guidelines, hemoglobin A1C <7.0% represents  optimal control in non-pregnant diabetic patients.  Different  metrics may apply to specific populations.   Standards of Medical Care in Diabetes - 2016.  For the purpose of screening for the presence of diabetes:  <5.7%     Consistent with the absence of diabetes  5.7-6.4%  Consistent with increasing risk for diabetes   (prediabetes)  >or=6.5%  Consistent with diabetes  Currently no consensus exists for use of hemoglobin A1C  for diagnosis of diabetes for children.       No results found  for: TRESA [id number]  Diabetes Management Status    Statin: Taking  ACE/ARB: Not taking    Screening or Prevention Patient's value Goal Complete/Controlled?   HgA1C Testing and Control   Lab Results   Component Value Date    HGBA1C 5.8 (H) 04/10/2018      Annually/Less than 8% Yes   Lipid profile : 12/01/2017 Annually Yes   LDL control Lab Results   Component Value Date    LDLCALC 50.6 (L) 12/01/2017    Annually/Less than 100 mg/dl  Yes   Nephropathy screening No results found for: LABMICR  No results found for: PROTEINUA Annually No   Blood pressure BP Readings from Last 1 Encounters:   04/18/18 133/88    Less than 140/90 Yes   Dilated retinal exam : 07/06/2016 Annually No   Foot exam   : 10/19/2017 Annually Yes                    Past Medical History:  Past Medical History:   Diagnosis Date    Anxiety     Arthritis     carpal tunnel    Cataract     Colon polyp 11/13/2012    repeat colonosopy in 5 years.    Combined hyperlipidemia associated with type 2 diabetes mellitus 2/28/2015    ED (erectile dysfunction)     Environmental allergies     GERD (gastroesophageal reflux disease)     Hiatal hernia 11/13/2012    found on EGD    Hyperglycemia     Hyperlipidemia LDL goal < 130     Hypertension goal BP (blood pressure) < 140/90     Insomnia     Strabismus     od as child//    Torn meniscus w-2    right knee    Tremor     controlled with xanax     Past Surgical History:   Procedure Laterality Date    ESOPHAGOGASTRODUODENOSCOPY  11/13/2012    SKIN LESION EXCISION      removed from head     Review of patient's allergies indicates:   Allergen Reactions    Codeine      Other reaction(s): Unknown    Escitalopram      Other reaction(s): Headache    Iodine     Penicillins      Other reaction(s): Unknown    Sulfa (sulfonamide antibiotics) Nausea Only and Rash     Current Outpatient Prescriptions on File Prior to Visit   Medication Sig Dispense Refill    alprazolam (XANAX) 1 MG tablet TAKE 1/2 TO 1  TABLET EVERY 6 TO 8 HOURS AS NEEDED 60 tablet 5    benazepril-hydrochlorthiazide (LOTENSIN HCT) 20-12.5 mg per tablet TAKE 1 TABLET TWICE DAILY 180 tablet 3    cloNIDine (CATAPRES) 0.1 MG tablet TAKE 1 TABLET (0.1 MG TOTAL) BY MOUTH EVERY EVENING. 90 tablet 0    diclofenac (VOLTAREN) 75 MG EC tablet TAKE 1 TABLET TWICE DAILY 60 tablet 0    ezetimibe (ZETIA) 10 mg tablet TAKE 1 TABLET (10 MG TOTAL) BY MOUTH ONCE DAILY. 30 tablet 8    fexofenadine (ALLEGRA) 180 MG tablet Take 1 tablet (180 mg total) by mouth once daily. 30 tablet 0    lansoprazole (PREVACID) 30 MG capsule TAKE 1 CAPSULE (30 MG TOTAL) BY MOUTH ONCE DAILY. 90 capsule 1    rosuvastatin (CRESTOR) 40 MG Tab Take 1 tablet (40 mg total) by mouth every evening. 30 tablet 0    tramadol (ULTRAM) 50 mg tablet TAKE 1 TABLET (50 MG TOTAL) BY MOUTH EVERY 6 (SIX) HOURS AS NEEDED FOR PAIN. 120 tablet 0     No current facility-administered medications on file prior to visit.      Social History     Social History    Marital status:      Spouse name: N/A    Number of children: N/A    Years of education: N/A     Occupational History    Not on file.     Social History Main Topics    Smoking status: Never Smoker    Smokeless tobacco: Never Used    Alcohol use No    Drug use: No    Sexual activity: Yes     Partners: Female     Other Topics Concern    Not on file     Social History Narrative    No narrative on file     Family History   Problem Relation Age of Onset    Heart attack Mother     Hypertension Mother     Heart attack Father     Hypertension Father     Brain cancer Father     Cancer Father     Glaucoma Father     Glaucoma Paternal Aunt     Amblyopia Neg Hx     Blindness Neg Hx     Cataracts Neg Hx     Diabetes Neg Hx     Retinal detachment Neg Hx     Strabismus Neg Hx     Stroke Neg Hx     Thyroid disease Neg Hx     Macular degeneration Neg Hx        Review of Systems    Objective:     /88   Pulse 81   Temp 97.7  "°F (36.5 °C) (Oral)   Ht 6' 1" (1.854 m)   Wt (!) 136.8 kg (301 lb 9.4 oz)   BMI 39.79 kg/m²     Physical Exam   Psychiatric: He has a normal mood and affect. His speech is normal and behavior is normal. Judgment and thought content normal. Cognition and memory are normal.     Assessment:     1. Anxiety    2. History of colon polyps    3. Type 2 diabetes mellitus with neurological manifestations        Plan:     Problem List Items Addressed This Visit     Anxiety    Relevant Medications    ALPRAZolam (XANAX) 0.5 MG tablet    History of colon polyps    Relevant Orders    Case request GI: COLONOSCOPY (Completed)    Type 2 diabetes mellitus with neurological manifestations    Relevant Orders    Ambulatory referral to Optometry        Marcos was seen today for medication refill.    Diagnoses and all orders for this visit:    Anxiety  -     ALPRAZolam (XANAX) 0.5 MG tablet; Take 1 tablet (0.5 mg total) by mouth nightly as needed for Anxiety.    History of colon polyps  -     Case request GI: COLONOSCOPY    Type 2 diabetes mellitus with neurological manifestations  -     Ambulatory referral to Optometry        No Follow-up on file.  "

## 2018-04-18 NOTE — PROGRESS NOTES
Reason for visit: Leukopenia    The patient is a 62-year-old  male who presents to the hematology oncology clinic today for follow up for leukopenia. He reports chronic fatigue. He denies any fever, chills or night sweats. He denies shortness of breath or chest pain. He denies any melena, hematemesis, or hematuria. Bowel movements are normal. Urination is normal. He denies any loss of appetite.    I have reviewed all of the patient's interval clinical history available in [facility].     PAST MEDICAL HISTORY:   1. Hypertension   2. Dyslipidemia  3. GERD   4. Anxiety/Depression  5. Migraine  6. Lumbar degenerative disc disease    SURGICAL HISTORY:   1. Scalp mass excision which was a lipoma    FAMILY HISTORY: Father  of complications related to a brain tumor at age 81. He denies any other immediate family members with cancer or bleeding/clotting disorders.     SOCIAL HISTORY: He does not smoke cigarettes. He does not drink alcohol. He has never used any recreational drugs. He works as an  and does airconditioning work as well. He is  and lives with his wife in [place]. He has 5 children.     ALLERGIES: Reviewed on medication card.     MEDICATIONS: [Medcard has been reviewed and/or reconciled.]     REVIEW OF SYSTEMS:   GENERAL: [No fevers, chills or sweats. No loss of appetite.] Reports fatigue.  HEENT: [No blurred vision, tinnitus, nasal discharge, sorethroat or dysphagia.]   HEART: [No chest pain, palpitations or shortness of breath.]   LUNGS: [No breathing problems.] He denies cough and hemoptysis.  ABDOMEN: [No abdominal pain, nausea, vomiting, diarrhea, constipation or melena.]   GENITOURINARY: [No bleeding or malodorous discharge.] No dysuria.  NEURO: [No headache, dizziness or vertigo.]   HEMATOLOGY: [No easy bruising, spontaneous bleeding or blood clots in the past].   MUSCULOSKELETAL: Denies myalgias or arthralgias.  SKIN: [No rashes or skin lesions.]   PSYCHIATRY: [No  depression or anxiety.]     PHYSICAL EXAMINATION:   VS: Reviewed on nurse's notes   APPEARANCE: The patient is a obese and well groomed  male who appears in no acute distress.  HEENT: No scleral icterus. No oral ulcers, lesions. Throat clear   HEAD: No sinus tenderness.  NECK: Supple. No palpable lymphadenopathy. Thyroid non-tender, no palpable masses.  CHEST: Clear to auscultation bilaterally. No rales. No rhonchi. Unlabored respirations.   CARDIOVASCULAR: Normal S1, S2. Normal rate. Regular rhythm.   ABDOMEN: Bowel sounds normal. No abdominal tenderness or distension. No hepatomegaly. No splenomegaly.   LYMPHATIC: No palpable supraclavicular, axillary nodes   EXTREMITIES: No clubbing, cyanosis. No edema in bilateral lower extremities.   SKIN: No lesions. No petechiae. No ecchymoses. No induration or nodules.   NEUROLOGIC: No focal findings. Alert & Oriented x 3. Mood appropriate to affect     LABS:   Reviewed     IMAGING:   Reviewed     IMPRESSION:   1. Normocytic anemia - stable  2. Thrombocytopenia - resolved  3. Chronic leukopenia - stable    PLAN:   1. I had a detailed discussion with the patient today about the results of his CBC. No significant anemia and thrombocytopenia noted today. His chronic leukopenia is stable. We will continue with close observation at this time. No urgent indication for any imaging or bone marrow biopsy at this time. He is clinically asymptomatic.    Follow up in 6 months with CBC. He knows to call sooner for any new problems or questions.    Rikki Johnson MD

## 2018-04-19 RX ORDER — DICLOFENAC SODIUM 75 MG/1
TABLET, DELAYED RELEASE ORAL
Qty: 60 TABLET | Refills: 0 | Status: SHIPPED | OUTPATIENT
Start: 2018-04-19 | End: 2018-05-26 | Stop reason: SDUPTHER

## 2018-04-23 ENCOUNTER — DOCUMENTATION ONLY (OUTPATIENT)
Dept: ENDOSCOPY | Facility: HOSPITAL | Age: 63
End: 2018-04-23

## 2018-04-24 DIAGNOSIS — E78.5 HYPERLIPIDEMIA, UNSPECIFIED HYPERLIPIDEMIA TYPE: ICD-10-CM

## 2018-04-27 RX ORDER — ROSUVASTATIN CALCIUM 40 MG/1
40 TABLET, COATED ORAL NIGHTLY
Qty: 30 TABLET | Refills: 5 | Status: SHIPPED | OUTPATIENT
Start: 2018-04-27 | End: 2018-10-23 | Stop reason: SDUPTHER

## 2018-05-26 RX ORDER — DICLOFENAC SODIUM 75 MG/1
TABLET, DELAYED RELEASE ORAL
Qty: 60 TABLET | Refills: 0 | Status: SHIPPED | OUTPATIENT
Start: 2018-05-26 | End: 2018-07-10 | Stop reason: SDUPTHER

## 2018-07-10 RX ORDER — DICLOFENAC SODIUM 75 MG/1
TABLET, DELAYED RELEASE ORAL
Qty: 60 TABLET | Refills: 0 | Status: SHIPPED | OUTPATIENT
Start: 2018-07-10 | End: 2018-08-19 | Stop reason: SDUPTHER

## 2018-07-14 DIAGNOSIS — E11.59 HYPERTENSION ASSOCIATED WITH DIABETES: ICD-10-CM

## 2018-07-14 DIAGNOSIS — I15.2 HYPERTENSION ASSOCIATED WITH DIABETES: ICD-10-CM

## 2018-07-16 RX ORDER — CLONIDINE HYDROCHLORIDE 0.1 MG/1
TABLET ORAL
Qty: 90 TABLET | Refills: 1 | Status: SHIPPED | OUTPATIENT
Start: 2018-07-16 | End: 2019-05-09 | Stop reason: SDUPTHER

## 2018-08-19 DIAGNOSIS — I15.2 HYPERTENSION ASSOCIATED WITH DIABETES: ICD-10-CM

## 2018-08-19 DIAGNOSIS — E11.59 HYPERTENSION ASSOCIATED WITH DIABETES: ICD-10-CM

## 2018-08-19 RX ORDER — CLONIDINE HYDROCHLORIDE 0.1 MG/1
TABLET ORAL
Qty: 90 TABLET | Refills: 0 | Status: SHIPPED | OUTPATIENT
Start: 2018-08-19 | End: 2022-04-29

## 2018-08-19 RX ORDER — DICLOFENAC SODIUM 75 MG/1
TABLET, DELAYED RELEASE ORAL
Qty: 60 TABLET | Refills: 0 | Status: SHIPPED | OUTPATIENT
Start: 2018-08-19 | End: 2018-09-12

## 2018-08-21 DIAGNOSIS — I15.2 HYPERTENSION ASSOCIATED WITH DIABETES: ICD-10-CM

## 2018-08-21 DIAGNOSIS — E11.59 HYPERTENSION ASSOCIATED WITH DIABETES: ICD-10-CM

## 2018-08-21 RX ORDER — BENAZEPRIL HYDROCHLORIDE AND HYDROCHLOROTHIAZIDE 20; 12.5 MG/1; MG/1
TABLET ORAL
Qty: 180 TABLET | Refills: 3 | Status: SHIPPED | OUTPATIENT
Start: 2018-08-21 | End: 2022-04-29 | Stop reason: ALTCHOICE

## 2018-08-21 NOTE — TELEPHONE ENCOUNTER
----- Message from Fabiano Fish sent at 8/21/2018 10:51 AM CDT -----  Contact: Pt/Wife  Please give pt wife a call at [phone] regarding a refill on his BENAZAPRIL.        [id number] - [place] - [address] [place]  [address] [facility] [address]  Phone: [phone] Fax: [phone]

## 2018-09-12 ENCOUNTER — OFFICE VISIT (OUTPATIENT)
Dept: FAMILY MEDICINE | Facility: CLINIC | Age: 63
End: 2018-09-12
Payer: COMMERCIAL

## 2018-09-12 VITALS
TEMPERATURE: 99 F | BODY MASS INDEX: 39.73 KG/M2 | HEIGHT: 73 IN | WEIGHT: 299.81 LBS | HEART RATE: 86 BPM | DIASTOLIC BLOOD PRESSURE: 88 MMHG | SYSTOLIC BLOOD PRESSURE: 134 MMHG

## 2018-09-12 DIAGNOSIS — J30.9 ALLERGIC SINUSITIS: Primary | ICD-10-CM

## 2018-09-12 PROCEDURE — 99213 OFFICE O/P EST LOW 20 MIN: CPT | Mod: S$GLB,,, | Performed by: NURSE PRACTITIONER

## 2018-09-12 PROCEDURE — 3008F BODY MASS INDEX DOCD: CPT | Mod: CPTII,S$GLB,, | Performed by: NURSE PRACTITIONER

## 2018-09-12 PROCEDURE — 3079F DIAST BP 80-89 MM HG: CPT | Mod: CPTII,S$GLB,, | Performed by: NURSE PRACTITIONER

## 2018-09-12 PROCEDURE — 3075F SYST BP GE 130 - 139MM HG: CPT | Mod: CPTII,S$GLB,, | Performed by: NURSE PRACTITIONER

## 2018-09-12 PROCEDURE — 99999 PR PBB SHADOW E&M-EST. PATIENT-LVL III: CPT | Mod: PBBFAC,,, | Performed by: NURSE PRACTITIONER

## 2018-09-12 RX ORDER — MINERAL OIL
180 ENEMA (ML) RECTAL DAILY
Qty: 30 TABLET | Refills: 0 | Status: SHIPPED | OUTPATIENT
Start: 2018-09-12 | End: 2018-10-17 | Stop reason: SDUPTHER

## 2018-09-12 NOTE — PROGRESS NOTES
Subjective:       Patient ID: Marcos Marinelli is a 63 y.o. male.    Chief Complaint: Sinus Problem    Sinus Problem   The current episode started in the past 7 days. The problem has been gradually worsening since onset. There has been no fever. The pain is moderate. Associated symptoms include congestion, a hoarse voice and sinus pressure. Pertinent negatives include no coughing, ear pain, headaches, shortness of breath or sore throat. (Post nasal drip) Past treatments include nothing.       Review of Systems   Constitutional: Negative for fatigue, fever and unexpected weight change.   HENT: Positive for congestion, hoarse voice, postnasal drip and sinus pressure. Negative for ear pain and sore throat.    Eyes: Negative.  Negative for pain and visual disturbance.   Respiratory: Negative for cough and shortness of breath.    Cardiovascular: Negative for chest pain and palpitations.   Gastrointestinal: Negative for abdominal pain, diarrhea, nausea and vomiting.   Genitourinary: Negative for dysuria and frequency.   Musculoskeletal: Negative for arthralgias and myalgias.   Skin: Negative for color change and rash.   Neurological: Negative for dizziness and headaches.   Psychiatric/Behavioral: Negative for sleep disturbance. The patient is not nervous/anxious.        Vitals:    09/12/18 1531   BP: 134/88   Pulse:    Temp:        Objective:     Current Outpatient Medications   Medication Sig Dispense Refill    ALPRAZolam (XANAX) 0.5 MG tablet Take 1 tablet (0.5 mg total) by mouth nightly as needed for Anxiety. 30 tablet 5    benazepril-hydrochlorthiazide (LOTENSIN HCT) 20-12.5 mg per tablet TAKE 1 TABLET TWICE DAILY 180 tablet 3    cloNIDine (CATAPRES) 0.1 MG tablet TAKE 1 TABLET (0.1 MG TOTAL) BY MOUTH EVERY EVENING. 90 tablet 1    ezetimibe (ZETIA) 10 mg tablet TAKE 1 TABLET (10 MG TOTAL) BY MOUTH ONCE DAILY. 30 tablet 8    fexofenadine (ALLEGRA) 180 MG tablet Take 1 tablet (180 mg total) by mouth once daily.  For sinus and allergy symptoms 30 tablet 0    lansoprazole (PREVACID) 30 MG capsule TAKE 1 CAPSULE (30 MG TOTAL) BY MOUTH ONCE DAILY. 90 capsule 1    promethazine (PHENERGAN) 25 MG tablet Take 1 tablet (25 mg total) by mouth every 6 (six) hours as needed for Nausea. 6 tablet 0    rosuvastatin (CRESTOR) 40 MG Tab TAKE 1 TABLET (40 MG TOTAL) BY MOUTH EVERY EVENING. 30 tablet 5    tramadol (ULTRAM) 50 mg tablet TAKE 1 TABLET (50 MG TOTAL) BY MOUTH EVERY 6 (SIX) HOURS AS NEEDED FOR PAIN. 120 tablet 0    cloNIDine (CATAPRES) 0.1 MG tablet TAKE 1 TABLET (0.1 MG TOTAL) BY MOUTH EVERY EVENING. 90 tablet 0    sodium,potassium,mag sulfates (SUPREP BOWEL PREP KIT) 17.5-3.13-1.6 gram SolR Take as instructed on prep sheet 354 mL 0     No current facility-administered medications for this visit.        Physical Exam   Constitutional: He is oriented to person, place, and time. He appears well-developed. No distress.   HENT:   Head: Normocephalic and atraumatic.   Right Ear: Tympanic membrane normal.   Left Ear: Tympanic membrane normal.   Nose: Mucosal edema present.   Mouth/Throat: Posterior oropharyngeal edema present.   Eyes: EOM are normal. Pupils are equal, round, and reactive to light.   Neck: Normal range of motion. Neck supple.   Cardiovascular: Normal rate and regular rhythm.   Pulmonary/Chest: Effort normal and breath sounds normal.   Musculoskeletal: Normal range of motion.   Neurological: He is alert and oriented to person, place, and time.   Skin: Skin is warm and dry. No rash noted.   Psychiatric: He has a normal mood and affect. Thought content normal.   Nursing note and vitals reviewed.      Assessment:       1. Allergic sinusitis        Plan:   Allergic sinusitis    Other orders  -     fexofenadine (ALLEGRA) 180 MG tablet; Take 1 tablet (180 mg total) by mouth once daily. For sinus and allergy symptoms  Dispense: 30 tablet; Refill: 0        Follow-up if symptoms worsen or fail to improve.    There are no  Patient Instructions on file for this visit.

## 2018-10-01 RX ORDER — DICLOFENAC SODIUM 75 MG/1
TABLET, DELAYED RELEASE ORAL
Qty: 60 TABLET | Refills: 0 | Status: SHIPPED | OUTPATIENT
Start: 2018-10-01 | End: 2018-10-30 | Stop reason: SDUPTHER

## 2018-10-17 RX ORDER — MINERAL OIL
180 ENEMA (ML) RECTAL DAILY
Qty: 30 TABLET | Refills: 0 | Status: SHIPPED | OUTPATIENT
Start: 2018-10-17 | End: 2022-04-29

## 2018-10-23 DIAGNOSIS — E78.5 HYPERLIPIDEMIA, UNSPECIFIED HYPERLIPIDEMIA TYPE: ICD-10-CM

## 2018-10-23 RX ORDER — ROSUVASTATIN CALCIUM 40 MG/1
40 TABLET, COATED ORAL NIGHTLY
Qty: 30 TABLET | Refills: 1 | Status: SHIPPED | OUTPATIENT
Start: 2018-10-23 | End: 2022-04-29 | Stop reason: SDUPTHER

## 2018-10-30 DIAGNOSIS — F41.9 ANXIETY: ICD-10-CM

## 2018-10-30 DIAGNOSIS — K21.9 GASTROESOPHAGEAL REFLUX DISEASE WITHOUT ESOPHAGITIS: ICD-10-CM

## 2018-10-30 RX ORDER — DICLOFENAC SODIUM 75 MG/1
TABLET, DELAYED RELEASE ORAL
Qty: 60 TABLET | Refills: 0 | Status: SHIPPED | OUTPATIENT
Start: 2018-10-30 | End: 2019-01-02 | Stop reason: SDUPTHER

## 2018-10-31 RX ORDER — LANSOPRAZOLE 30 MG/1
30 CAPSULE, DELAYED RELEASE ORAL DAILY
Qty: 90 CAPSULE | Refills: 1 | Status: SHIPPED | OUTPATIENT
Start: 2018-10-31 | End: 2022-04-29

## 2018-10-31 RX ORDER — ALPRAZOLAM 0.5 MG/1
TABLET ORAL
Qty: 30 TABLET | Refills: 5 | Status: SHIPPED | OUTPATIENT
Start: 2018-10-31 | End: 2022-04-29

## 2018-12-07 DIAGNOSIS — E11.9 TYPE 2 DIABETES MELLITUS WITHOUT COMPLICATION: ICD-10-CM

## 2019-01-02 RX ORDER — DICLOFENAC SODIUM 75 MG/1
TABLET, DELAYED RELEASE ORAL
Qty: 60 TABLET | Refills: 0 | Status: SHIPPED | OUTPATIENT
Start: 2019-01-02 | End: 2019-02-11 | Stop reason: SDUPTHER

## 2019-02-11 RX ORDER — DICLOFENAC SODIUM 75 MG/1
TABLET, DELAYED RELEASE ORAL
Qty: 60 TABLET | Refills: 0 | Status: SHIPPED | OUTPATIENT
Start: 2019-02-11 | End: 2019-03-25 | Stop reason: SDUPTHER

## 2019-03-26 RX ORDER — DICLOFENAC SODIUM 75 MG/1
TABLET, DELAYED RELEASE ORAL
Qty: 60 TABLET | Refills: 0 | Status: SHIPPED | OUTPATIENT
Start: 2019-03-26 | End: 2022-04-29

## 2019-03-26 NOTE — TELEPHONE ENCOUNTER
I refilled the requested medication x 1 month.    The patient is due for an visit in the office.  Call the patient on the phone and book the patient with Phyllis Walker NP for a visit.     PLEASE DOCUMENT THE FACT THAT YOU HAVE CONTACTED THE PATIENT IN THE CHART FOR FUTURE REFERENCE.    Health Maintenance Due   Topic Date Due    Low Dose Statin  08/06/1978    Zoster Vaccine  08/06/2017    Colonoscopy  11/13/2017    Eye Exam  01/16/2018    Influenza Vaccine  08/01/2018    Hemoglobin A1c  10/18/2018    Foot Exam  10/19/2018    Lipid Panel  12/01/2018

## 2019-04-10 ENCOUNTER — OCCUPATIONAL HEALTH (OUTPATIENT)
Dept: URGENT CARE | Facility: CLINIC | Age: 64
End: 2019-04-10

## 2019-04-10 DIAGNOSIS — Z02.83 ENCOUNTER FOR DRUG SCREENING: Primary | ICD-10-CM

## 2019-04-10 LAB
CTP QC/QA: YES
POC 5 PANEL DRUG SCREEN: NEGATIVE

## 2019-04-10 PROCEDURE — 80305 POCT RAPID DRUG SCREEN 5 PANEL: ICD-10-PCS | Mod: QW,S$GLB,, | Performed by: FAMILY MEDICINE

## 2019-04-10 PROCEDURE — 80305 DRUG TEST PRSMV DIR OPT OBS: CPT | Mod: QW,S$GLB,, | Performed by: FAMILY MEDICINE

## 2019-04-30 DIAGNOSIS — F41.9 ANXIETY: ICD-10-CM

## 2019-04-30 RX ORDER — ALPRAZOLAM 0.5 MG/1
TABLET ORAL
Qty: 30 TABLET | OUTPATIENT
Start: 2019-04-30

## 2019-05-09 DIAGNOSIS — I15.2 HYPERTENSION ASSOCIATED WITH DIABETES: ICD-10-CM

## 2019-05-09 DIAGNOSIS — E11.59 HYPERTENSION ASSOCIATED WITH DIABETES: ICD-10-CM

## 2019-05-10 RX ORDER — CLONIDINE HYDROCHLORIDE 0.1 MG/1
TABLET ORAL
Qty: 90 TABLET | Refills: 0 | Status: SHIPPED | OUTPATIENT
Start: 2019-05-10 | End: 2022-04-29 | Stop reason: ALTCHOICE

## 2019-07-23 NOTE — TELEPHONE ENCOUNTER
The patient needs an appointment for a face to face visit for documentation of chronic narcotic use.  I cannot refill the requested narcotic at this time.  
Patient felt better during ED stay. Pt refused blood draw in the ED.  States he had recent labwork that was nl.  Verbalized understanding of risk/benefit.  Patient was discharged from the ED. Any available test results were discussed with the patient. Verbal instructions were given, including instructions to return to ED immediately for any new, worsening, or concerning symptoms. Patient endorsed understanding. Written discharge instructions additionally given, including follow-up plan.

## 2020-10-06 ENCOUNTER — PATIENT MESSAGE (OUTPATIENT)
Dept: ADMINISTRATIVE | Facility: HOSPITAL | Age: 65
End: 2020-10-06

## 2022-04-29 ENCOUNTER — OFFICE VISIT (OUTPATIENT)
Dept: FAMILY MEDICINE | Facility: CLINIC | Age: 67
End: 2022-04-29
Payer: COMMERCIAL

## 2022-04-29 VITALS
DIASTOLIC BLOOD PRESSURE: 121 MMHG | OXYGEN SATURATION: 100 % | TEMPERATURE: 99 F | HEIGHT: 73 IN | WEIGHT: 307.63 LBS | HEART RATE: 88 BPM | BODY MASS INDEX: 40.77 KG/M2 | SYSTOLIC BLOOD PRESSURE: 182 MMHG | RESPIRATION RATE: 18 BRPM

## 2022-04-29 DIAGNOSIS — J30.1 SEASONAL ALLERGIC RHINITIS DUE TO POLLEN: ICD-10-CM

## 2022-04-29 DIAGNOSIS — F41.9 ANXIETY AND DEPRESSION: ICD-10-CM

## 2022-04-29 DIAGNOSIS — E78.2 COMBINED HYPERLIPIDEMIA ASSOCIATED WITH TYPE 2 DIABETES MELLITUS: ICD-10-CM

## 2022-04-29 DIAGNOSIS — E11.69 COMBINED HYPERLIPIDEMIA ASSOCIATED WITH TYPE 2 DIABETES MELLITUS: ICD-10-CM

## 2022-04-29 DIAGNOSIS — F32.A ANXIETY AND DEPRESSION: ICD-10-CM

## 2022-04-29 DIAGNOSIS — Z12.5 ENCOUNTER FOR SCREENING FOR MALIGNANT NEOPLASM OF PROSTATE: ICD-10-CM

## 2022-04-29 DIAGNOSIS — I15.2 HYPERTENSION ASSOCIATED WITH DIABETES: Primary | ICD-10-CM

## 2022-04-29 DIAGNOSIS — E78.5 HYPERLIPIDEMIA, UNSPECIFIED HYPERLIPIDEMIA TYPE: ICD-10-CM

## 2022-04-29 DIAGNOSIS — E11.59 HYPERTENSION ASSOCIATED WITH DIABETES: Primary | ICD-10-CM

## 2022-04-29 DIAGNOSIS — E66.01 CLASS 3 SEVERE OBESITY DUE TO EXCESS CALORIES WITH SERIOUS COMORBIDITY AND BODY MASS INDEX (BMI) OF 40.0 TO 44.9 IN ADULT: ICD-10-CM

## 2022-04-29 DIAGNOSIS — K21.9 GASTROESOPHAGEAL REFLUX DISEASE WITHOUT ESOPHAGITIS: ICD-10-CM

## 2022-04-29 DIAGNOSIS — Z12.11 COLON CANCER SCREENING: ICD-10-CM

## 2022-04-29 DIAGNOSIS — E11.49 TYPE 2 DIABETES MELLITUS WITH NEUROLOGICAL MANIFESTATIONS: ICD-10-CM

## 2022-04-29 PROBLEM — E66.813 CLASS 3 SEVERE OBESITY WITH BODY MASS INDEX (BMI) OF 40.0 TO 44.9 IN ADULT: Status: ACTIVE | Noted: 2022-04-29

## 2022-04-29 PROCEDURE — 1159F MED LIST DOCD IN RCRD: CPT | Mod: CPTII,S$GLB,, | Performed by: STUDENT IN AN ORGANIZED HEALTH CARE EDUCATION/TRAINING PROGRAM

## 2022-04-29 PROCEDURE — 3080F PR MOST RECENT DIASTOLIC BLOOD PRESSURE >= 90 MM HG: ICD-10-PCS | Mod: CPTII,S$GLB,, | Performed by: STUDENT IN AN ORGANIZED HEALTH CARE EDUCATION/TRAINING PROGRAM

## 2022-04-29 PROCEDURE — 3008F PR BODY MASS INDEX (BMI) DOCUMENTED: ICD-10-PCS | Mod: CPTII,S$GLB,, | Performed by: STUDENT IN AN ORGANIZED HEALTH CARE EDUCATION/TRAINING PROGRAM

## 2022-04-29 PROCEDURE — 1160F PR REVIEW ALL MEDS BY PRESCRIBER/CLIN PHARMACIST DOCUMENTED: ICD-10-PCS | Mod: CPTII,S$GLB,, | Performed by: STUDENT IN AN ORGANIZED HEALTH CARE EDUCATION/TRAINING PROGRAM

## 2022-04-29 PROCEDURE — 99999 PR PBB SHADOW E&M-EST. PATIENT-LVL IV: CPT | Mod: PBBFAC,,, | Performed by: STUDENT IN AN ORGANIZED HEALTH CARE EDUCATION/TRAINING PROGRAM

## 2022-04-29 PROCEDURE — 1159F PR MEDICATION LIST DOCUMENTED IN MEDICAL RECORD: ICD-10-PCS | Mod: CPTII,S$GLB,, | Performed by: STUDENT IN AN ORGANIZED HEALTH CARE EDUCATION/TRAINING PROGRAM

## 2022-04-29 PROCEDURE — 3008F BODY MASS INDEX DOCD: CPT | Mod: CPTII,S$GLB,, | Performed by: STUDENT IN AN ORGANIZED HEALTH CARE EDUCATION/TRAINING PROGRAM

## 2022-04-29 PROCEDURE — 99204 OFFICE O/P NEW MOD 45 MIN: CPT | Mod: S$GLB,,, | Performed by: STUDENT IN AN ORGANIZED HEALTH CARE EDUCATION/TRAINING PROGRAM

## 2022-04-29 PROCEDURE — 4010F ACE/ARB THERAPY RXD/TAKEN: CPT | Mod: CPTII,S$GLB,, | Performed by: STUDENT IN AN ORGANIZED HEALTH CARE EDUCATION/TRAINING PROGRAM

## 2022-04-29 PROCEDURE — 1160F RVW MEDS BY RX/DR IN RCRD: CPT | Mod: CPTII,S$GLB,, | Performed by: STUDENT IN AN ORGANIZED HEALTH CARE EDUCATION/TRAINING PROGRAM

## 2022-04-29 PROCEDURE — 99204 PR OFFICE/OUTPT VISIT, NEW, LEVL IV, 45-59 MIN: ICD-10-PCS | Mod: S$GLB,,, | Performed by: STUDENT IN AN ORGANIZED HEALTH CARE EDUCATION/TRAINING PROGRAM

## 2022-04-29 PROCEDURE — 4010F PR ACE/ARB THEARPY RXD/TAKEN: ICD-10-PCS | Mod: CPTII,S$GLB,, | Performed by: STUDENT IN AN ORGANIZED HEALTH CARE EDUCATION/TRAINING PROGRAM

## 2022-04-29 PROCEDURE — 3080F DIAST BP >= 90 MM HG: CPT | Mod: CPTII,S$GLB,, | Performed by: STUDENT IN AN ORGANIZED HEALTH CARE EDUCATION/TRAINING PROGRAM

## 2022-04-29 PROCEDURE — 99999 PR PBB SHADOW E&M-EST. PATIENT-LVL IV: ICD-10-PCS | Mod: PBBFAC,,, | Performed by: STUDENT IN AN ORGANIZED HEALTH CARE EDUCATION/TRAINING PROGRAM

## 2022-04-29 PROCEDURE — 3077F PR MOST RECENT SYSTOLIC BLOOD PRESSURE >= 140 MM HG: ICD-10-PCS | Mod: CPTII,S$GLB,, | Performed by: STUDENT IN AN ORGANIZED HEALTH CARE EDUCATION/TRAINING PROGRAM

## 2022-04-29 PROCEDURE — 3077F SYST BP >= 140 MM HG: CPT | Mod: CPTII,S$GLB,, | Performed by: STUDENT IN AN ORGANIZED HEALTH CARE EDUCATION/TRAINING PROGRAM

## 2022-04-29 RX ORDER — ROSUVASTATIN CALCIUM 40 MG/1
40 TABLET, COATED ORAL NIGHTLY
Qty: 90 TABLET | Refills: 3 | Status: SHIPPED | OUTPATIENT
Start: 2022-04-29 | End: 2022-05-02 | Stop reason: SDUPTHER

## 2022-04-29 RX ORDER — MINERAL OIL
180 ENEMA (ML) RECTAL DAILY
Qty: 30 TABLET | Refills: 0
Start: 2022-04-29 | End: 2022-05-02 | Stop reason: SDUPTHER

## 2022-04-29 RX ORDER — BENAZEPRIL HYDROCHLORIDE AND HYDROCHLOROTHIAZIDE 20; 12.5 MG/1; MG/1
TABLET ORAL
Qty: 180 TABLET | Refills: 3 | Status: SHIPPED | OUTPATIENT
Start: 2022-04-29 | End: 2022-05-02 | Stop reason: SDUPTHER

## 2022-04-29 RX ORDER — LANSOPRAZOLE 30 MG/1
30 CAPSULE, DELAYED RELEASE ORAL DAILY
Qty: 90 CAPSULE | Refills: 1
Start: 2022-04-29 | End: 2022-05-02 | Stop reason: SDUPTHER

## 2022-04-29 NOTE — PROGRESS NOTES
Problem List Items Addressed This Visit        Psychiatric    Anxiety and depression    Overview     Reports chronic intermittent hx; currently in remission. Feels well. Previously on xanax               Cardiac/Vascular    Hypertension associated with diabetes - Primary    Overview     - above goal today; asx; has been out of meds  - Current Hypertension Medications:   Hypertension Medications             benazepril-hydrochlorthiazide (LOTENSIN HCT) 20-12.5 mg per tablet TAKE 1 TABLET TWICE DAILY        -continue lifestyle modification with low sodium diet and exercise   -discussed hypertension disease course and importance of treating high blood pressure  -patient understood and advised of risk of untreated blood pressure.  ER precautions were given   for symptoms of hypertensive urgency and emergency.             Relevant Medications    benazepril-hydrochlorthiazide (LOTENSIN HCT) 20-12.5 mg per tablet    Other Relevant Orders    Hypertension Digital Medicine (HDMP) Enrollment Order (Completed)    Hypertension Digital Medicine (HDMP): Assign Onboarding Questionnaires (Completed)    Diabetes Digital Medicine (DDMP) Enrollment Order (Completed)    Diabetes Digital Medicine (DDMP): Assign Onboarding Questionnaires (Completed)    CBC Auto Differential    Comprehensive Metabolic Panel    Hemoglobin A1C    Lipid Panel    TSH    Combined hyperlipidemia associated with type 2 diabetes mellitus    Overview     -chronic condition. Due for repeat lab, ordered    -reports compliance with hyperlipidemia treatment as prescribed  -denies any known adverse effects of medications  -recent labs listed below:  Lab Results   Component Value Date    CHOL 100 (L) 12/01/2017     Lab Results   Component Value Date    HDL 41 12/01/2017     Lab Results   Component Value Date    LDLCALC 50.6 (L) 12/01/2017     Lab Results   Component Value Date    TRIG 42 12/01/2017     Lab Results   Component Value Date    ALT 18 12/01/2017    AST 21  12/01/2017    ALKPHOS 65 12/01/2017    BILITOT 0.5 12/01/2017                   Endocrine    Type 2 diabetes mellitus with neurological manifestations    Overview     Well-controlled, diet controlled; due for repeat A1c, ordered  Lab Results   Component Value Date    HGBA1C 5.8 (H) 04/10/2018     BG home readings: n/a  Hypoglycemic Events: n/a    -current meds: n/a, diet-controlled     -on statin:   Hyperlipidemia Medications             rosuvastatin (CRESTOR) 40 MG Tab Take 1 tablet (40 mg total) by mouth every evening.        -on ACE-I/ARB:   Hypertension Medications             benazepril-hydrochlorthiazide (LOTENSIN HCT) 20-12.5 mg per tablet TAKE 1 TABLET TWICE DAILY        -counseling provided on importance of diabetic diet and medication compliance in order to treat diabetes  -discussed diabetes disease course and potential complications  Follow up 3 months              Relevant Orders    CBC Auto Differential    Comprehensive Metabolic Panel    Hemoglobin A1C    Lipid Panel    TSH    Class 3 severe obesity with body mass index (BMI) of 40.0 to 44.9 in adult    Overview     The patient and I had a discussion about obesity and ways to treat it.  At this time, we agreed to use the following to address this:    General weight loss/lifestyle modification strategies discussed (elicit support from others; identify saboteurs; non-food rewards, etc).  Informal exercise measures discussed, e.g. taking stairs instead of elevator.  Regular aerobic exercise program discussed.                   GI    Gastroesophageal reflux disease without esophagitis    Overview     -symptoms controlled with prn PPI and avoidance of tomatoes  -denies alarm symptoms, such as dysphagia, weight loss or N/V  -continue lifestyle modification with avoidance of acidic foods, caffeine, late night eating             Relevant Medications    lansoprazole (PREVACID) 30 MG capsule      Other Visit Diagnoses     Colon cancer screening        Relevant  "Orders    Ambulatory referral/consult to Endo Procedure     Hyperlipidemia, unspecified hyperlipidemia type        Relevant Medications    rosuvastatin (CRESTOR) 40 MG Tab    Seasonal allergic rhinitis due to pollen        Relevant Medications    fexofenadine (ALLEGRA) 180 MG tablet    Encounter for screening for malignant neoplasm of prostate        Relevant Orders    PSA, Screening            Patient ID: Marcos Marinelli is a 66 y.o. male.    Chief Complaint:  establish care    Previous PCP: [facility]     Patient is here to establish care. Has a hx of  has a past medical history of Anxiety, Arthritis, Cataract, Colon polyp (11/13/2012), Combined hyperlipidemia associated with type 2 diabetes mellitus (2/28/2015), ED (erectile dysfunction), Environmental allergies, GERD (gastroesophageal reflux disease), Hiatal hernia (11/13/2012), Hyperglycemia, Hyperlipidemia LDL goal < 130, Hypertension goal BP (blood pressure) < 140/90, Insomnia, Strabismus, Torn meniscus (w-2), and Tremor.   Reports he has been out of his HTN and HLD meds for "a while." BP elevated today. Reports elevated 2/2 to coffee. Reports home BP 140s/90s. Otherwise, doing well.     Denies fevers, chills, chest pain, SOB, fatigue, abdominal pain, nausea, vomiting, dysuria, hematuria, hematochezia, or melena.       Colonoscopy: hx of colonic polyps, due for repeat CSC, ordered. No personal history of colon cancer, hematochezia, melena, crohn's, ulcerative colitis; No family history of colon cancer.      Health Maintenance Topics with due status: Not Due       Topic Last Completion Date    Lipid Panel 12/01/2017        ==============================================  History reviewed.   Health Maintenance Due   Topic Date Due    PROSTATE-SPECIFIC ANTIGEN  02/04/2010    Colorectal Cancer Screening  11/13/2017    COVID-19 Vaccine (4 - Booster for Pfizer series) 05/05/2022       Past Medical History:  Past Medical History:   Diagnosis Date    " Anxiety     Arthritis     carpal tunnel    Cataract     Colon polyp 11/13/2012    repeat colonosopy in 5 years.    Combined hyperlipidemia associated with type 2 diabetes mellitus 2/28/2015    ED (erectile dysfunction)     Environmental allergies     GERD (gastroesophageal reflux disease)     Hiatal hernia 11/13/2012    found on EGD    Hyperglycemia     Hyperlipidemia LDL goal < 130     Hypertension goal BP (blood pressure) < 140/90     Insomnia     Strabismus     od as child//    Torn meniscus w-2    right knee    Tremor     controlled with xanax     Past Surgical History:   Procedure Laterality Date    ESOPHAGOGASTRODUODENOSCOPY  11/13/2012    SKIN LESION EXCISION      removed from head     Review of patient's allergies indicates:   Allergen Reactions    Codeine      Other reaction(s): Unknown  Other reaction(s): Unknown    Escitalopram Other (See Comments)     Other reaction(s): Headache    Iodine     Penicillin      Other reaction(s): Unknown    Penicillins      Other reaction(s): Unknown    Bactrim [sulfamethoxazole-trimethoprim] Rash    Sulfa (sulfonamide antibiotics) Nausea Only and Rash     Current Outpatient Medications on File Prior to Visit   Medication Sig Dispense Refill    [DISCONTINUED] cloNIDine (CATAPRES) 0.1 MG tablet TAKE 1 TABLET (0.1 MG TOTAL) BY MOUTH EVERY EVENING. 90 tablet 0    [DISCONTINUED] cloNIDine (CATAPRES) 0.1 MG tablet TAKE 1 TABLET BY MOUTH EVERY EVENING 90 tablet 0    [DISCONTINUED] diclofenac (VOLTAREN) 75 MG EC tablet TAKE 1 TABLET TWICE DAILY 60 tablet 0    [DISCONTINUED] ezetimibe (ZETIA) 10 mg tablet TAKE 1 TABLET (10 MG TOTAL) BY MOUTH ONCE DAILY. 30 tablet 8    [DISCONTINUED] lansoprazole (PREVACID) 30 MG capsule TAKE 1 CAPSULE (30 MG TOTAL) BY MOUTH ONCE DAILY. 90 capsule 1    [DISCONTINUED] promethazine (PHENERGAN) 25 MG tablet Take 1 tablet (25 mg total) by mouth every 6 (six) hours as needed for Nausea. 6 tablet 0    [DISCONTINUED]  sodium,potassium,mag sulfates (SUPREP BOWEL PREP KIT) 17.5-3.13-1.6 gram SolR Take as instructed on prep sheet 354 mL 0    [DISCONTINUED] tramadol (ULTRAM) 50 mg tablet TAKE 1 TABLET (50 MG TOTAL) BY MOUTH EVERY 6 (SIX) HOURS AS NEEDED FOR PAIN. 120 tablet 0    [DISCONTINUED] ALPRAZolam (XANAX) 0.5 MG tablet TAKE 1 TABLET (0.5 MG TOTAL) BY MOUTH ONCE A DAY AT NIGHT AS NEEDED FOR ANXIETY. (Patient not taking: Reported on 4/29/2022) 30 tablet 5    [DISCONTINUED] benazepril-hydrochlorthiazide (LOTENSIN HCT) 20-12.5 mg per tablet TAKE 1 TABLET TWICE DAILY (Patient not taking: Reported on 4/29/2022) 180 tablet 3    [DISCONTINUED] fexofenadine (ALLEGRA) 180 MG tablet TAKE 1 TABLET (180 MG TOTAL) BY MOUTH ONCE DAILY. FOR SINUS AND ALLERGY SYMPTOMS 30 tablet 0    [DISCONTINUED] rosuvastatin (CRESTOR) 40 MG Tab TAKE 1 TABLET (40 MG TOTAL) BY MOUTH EVERY EVENING. 30 tablet 1     No current facility-administered medications on file prior to visit.     Social History     Socioeconomic History    Marital status:    Tobacco Use    Smoking status: Never Smoker    Smokeless tobacco: Never Used   Substance and Sexual Activity    Alcohol use: No    Drug use: No    Sexual activity: Yes     Partners: Female     Family History   Problem Relation Age of Onset    Heart attack Mother     Hypertension Mother     Heart attack Father     Hypertension Father     Brain cancer Father     Cancer Father     Glaucoma Father     Glaucoma Paternal Aunt     Amblyopia Neg Hx     Blindness Neg Hx     Cataracts Neg Hx     Diabetes Neg Hx     Retinal detachment Neg Hx     Strabismus Neg Hx     Stroke Neg Hx     Thyroid disease Neg Hx     Macular degeneration Neg Hx           Review of Systems   12 point review of systems per hpi, otherwise negative         Objective:    Nursing note and vitals reviewed.  Vitals:    04/29/22 0929   BP: (!) 182/121   Pulse: 88   Resp:    Temp:      Body mass index is 40.58 kg/m².      Physical Exam   Constitutional:oriented to person, place, and time. appears well-developed and well-nourished. No distress.   HENT: WNL, poor dentition  Head: Normocephalic and atraumatic.   Eyes: Pupils are equal, round, and reactive to light. EOM are normal.   Neck: Normal range of motion. Neck supple.   Cardiovascular: Normal rate, regular rhythm, normal heart sounds and intact distal pulses.   No murmur heard.  Pulmonary/Chest: Effort normal and breath sounds normal. No respiratory distress. no wheezes.   GI: soft, non distended, no ttp, no rebound/guarding  Musculoskeletal: Normal range of motion. no edema.   Neurological: alert and oriented to person, place, and time. No cranial nerve deficit.   Skin: Skin is warm and dry. Capillary refill takes less than 2 seconds.   Psychiatric: normal mood and affect. behavior is normal.           Ness Cai MD    We Offer Telehealth & Same Day Appointments!   Book your Telehealth appointment with me through my nurse or   Clinic appointments on [employer]!  [phone]     To Schedule appointments online, go to [employer]: [email or web address]

## 2022-05-02 ENCOUNTER — LAB VISIT (OUTPATIENT)
Dept: LAB | Facility: HOSPITAL | Age: 67
End: 2022-05-02
Attending: STUDENT IN AN ORGANIZED HEALTH CARE EDUCATION/TRAINING PROGRAM
Payer: COMMERCIAL

## 2022-05-02 DIAGNOSIS — J30.1 SEASONAL ALLERGIC RHINITIS DUE TO POLLEN: ICD-10-CM

## 2022-05-02 DIAGNOSIS — Z12.5 ENCOUNTER FOR SCREENING FOR MALIGNANT NEOPLASM OF PROSTATE: ICD-10-CM

## 2022-05-02 DIAGNOSIS — E11.59 HYPERTENSION ASSOCIATED WITH DIABETES: ICD-10-CM

## 2022-05-02 DIAGNOSIS — E78.5 HYPERLIPIDEMIA, UNSPECIFIED HYPERLIPIDEMIA TYPE: ICD-10-CM

## 2022-05-02 DIAGNOSIS — I15.2 HYPERTENSION ASSOCIATED WITH DIABETES: ICD-10-CM

## 2022-05-02 DIAGNOSIS — E11.49 TYPE 2 DIABETES MELLITUS WITH NEUROLOGICAL MANIFESTATIONS: ICD-10-CM

## 2022-05-02 DIAGNOSIS — K21.9 GASTROESOPHAGEAL REFLUX DISEASE WITHOUT ESOPHAGITIS: ICD-10-CM

## 2022-05-02 LAB
ALBUMIN SERPL BCP-MCNC: 3.8 G/DL (ref 3.5–5.2)
ALP SERPL-CCNC: 81 U/L (ref 55–135)
ALT SERPL W/O P-5'-P-CCNC: 14 U/L (ref 10–44)
ANION GAP SERPL CALC-SCNC: 11 MMOL/L (ref 8–16)
AST SERPL-CCNC: 16 U/L (ref 10–40)
BASOPHILS # BLD AUTO: 0.02 K/UL (ref 0–0.2)
BASOPHILS NFR BLD: 0.5 % (ref 0–1.9)
BILIRUB SERPL-MCNC: 0.6 MG/DL (ref 0.1–1)
BUN SERPL-MCNC: 12 MG/DL (ref 8–23)
CALCIUM SERPL-MCNC: 9.5 MG/DL (ref 8.7–10.5)
CHLORIDE SERPL-SCNC: 100 MMOL/L (ref 95–110)
CHOLEST SERPL-MCNC: 232 MG/DL (ref 120–199)
CHOLEST/HDLC SERPL: 5.8 {RATIO} (ref 2–5)
CO2 SERPL-SCNC: 28 MMOL/L (ref 23–29)
COMPLEXED PSA SERPL-MCNC: 2.6 NG/ML (ref 0–4)
CREAT SERPL-MCNC: 1.1 MG/DL (ref 0.5–1.4)
DIFFERENTIAL METHOD: ABNORMAL
EOSINOPHIL # BLD AUTO: 0.1 K/UL (ref 0–0.5)
EOSINOPHIL NFR BLD: 2.6 % (ref 0–8)
ERYTHROCYTE [DISTWIDTH] IN BLOOD BY AUTOMATED COUNT: 15 % (ref 11.5–14.5)
EST. GFR  (AFRICAN AMERICAN): >60 ML/MIN/1.73 M^2
EST. GFR  (NON AFRICAN AMERICAN): >60 ML/MIN/1.73 M^2
ESTIMATED AVG GLUCOSE: 123 MG/DL (ref 68–131)
GLUCOSE SERPL-MCNC: 96 MG/DL (ref 70–110)
HBA1C MFR BLD: 5.9 % (ref 4–5.6)
HCT VFR BLD AUTO: 43.9 % (ref 40–54)
HDLC SERPL-MCNC: 40 MG/DL (ref 40–75)
HDLC SERPL: 17.2 % (ref 20–50)
HGB BLD-MCNC: 14.2 G/DL (ref 14–18)
IMM GRANULOCYTES # BLD AUTO: 0.01 K/UL (ref 0–0.04)
IMM GRANULOCYTES NFR BLD AUTO: 0.3 % (ref 0–0.5)
LDLC SERPL CALC-MCNC: 170.4 MG/DL (ref 63–159)
LYMPHOCYTES # BLD AUTO: 1.8 K/UL (ref 1–4.8)
LYMPHOCYTES NFR BLD: 47.5 % (ref 18–48)
MCH RBC QN AUTO: 29.4 PG (ref 27–31)
MCHC RBC AUTO-ENTMCNC: 32.3 G/DL (ref 32–36)
MCV RBC AUTO: 91 FL (ref 82–98)
MONOCYTES # BLD AUTO: 0.5 K/UL (ref 0.3–1)
MONOCYTES NFR BLD: 11.7 % (ref 4–15)
NEUTROPHILS # BLD AUTO: 1.4 K/UL (ref 1.8–7.7)
NEUTROPHILS NFR BLD: 37.4 % (ref 38–73)
NONHDLC SERPL-MCNC: 192 MG/DL
NRBC BLD-RTO: 0 /100 WBC
PLATELET # BLD AUTO: 215 K/UL (ref 150–450)
PMV BLD AUTO: 11 FL (ref 9.2–12.9)
POTASSIUM SERPL-SCNC: 4.1 MMOL/L (ref 3.5–5.1)
PROT SERPL-MCNC: 6.7 G/DL (ref 6–8.4)
RBC # BLD AUTO: 4.83 M/UL (ref 4.6–6.2)
SODIUM SERPL-SCNC: 139 MMOL/L (ref 136–145)
TRIGL SERPL-MCNC: 108 MG/DL (ref 30–150)
TSH SERPL DL<=0.005 MIU/L-ACNC: 1.52 UIU/ML (ref 0.4–4)
WBC # BLD AUTO: 3.85 K/UL (ref 3.9–12.7)

## 2022-05-02 PROCEDURE — 83036 HEMOGLOBIN GLYCOSYLATED A1C: CPT | Performed by: STUDENT IN AN ORGANIZED HEALTH CARE EDUCATION/TRAINING PROGRAM

## 2022-05-02 PROCEDURE — 80053 COMPREHEN METABOLIC PANEL: CPT | Performed by: STUDENT IN AN ORGANIZED HEALTH CARE EDUCATION/TRAINING PROGRAM

## 2022-05-02 PROCEDURE — 84443 ASSAY THYROID STIM HORMONE: CPT | Performed by: STUDENT IN AN ORGANIZED HEALTH CARE EDUCATION/TRAINING PROGRAM

## 2022-05-02 PROCEDURE — 80061 LIPID PANEL: CPT | Performed by: STUDENT IN AN ORGANIZED HEALTH CARE EDUCATION/TRAINING PROGRAM

## 2022-05-02 PROCEDURE — 84153 ASSAY OF PSA TOTAL: CPT | Performed by: STUDENT IN AN ORGANIZED HEALTH CARE EDUCATION/TRAINING PROGRAM

## 2022-05-02 PROCEDURE — 36415 COLL VENOUS BLD VENIPUNCTURE: CPT | Mod: PO | Performed by: STUDENT IN AN ORGANIZED HEALTH CARE EDUCATION/TRAINING PROGRAM

## 2022-05-02 PROCEDURE — 85025 COMPLETE CBC W/AUTO DIFF WBC: CPT | Performed by: STUDENT IN AN ORGANIZED HEALTH CARE EDUCATION/TRAINING PROGRAM

## 2022-05-02 RX ORDER — LANSOPRAZOLE 30 MG/1
30 CAPSULE, DELAYED RELEASE ORAL DAILY
Qty: 90 CAPSULE | Refills: 1
Start: 2022-05-02 | End: 2022-08-24

## 2022-05-02 RX ORDER — ROSUVASTATIN CALCIUM 40 MG/1
40 TABLET, COATED ORAL NIGHTLY
Qty: 90 TABLET | Refills: 1 | OUTPATIENT
Start: 2022-05-02 | End: 2022-07-31

## 2022-05-02 RX ORDER — ROSUVASTATIN CALCIUM 40 MG/1
40 TABLET, COATED ORAL NIGHTLY
Qty: 90 TABLET | Refills: 1 | Status: SHIPPED | OUTPATIENT
Start: 2022-05-02 | End: 2022-08-24 | Stop reason: SDUPTHER

## 2022-05-02 RX ORDER — MINERAL OIL
180 ENEMA (ML) RECTAL DAILY
Qty: 90 TABLET | Refills: 0
Start: 2022-05-02 | End: 2023-05-02

## 2022-05-02 RX ORDER — BENAZEPRIL HYDROCHLORIDE AND HYDROCHLOROTHIAZIDE 20; 12.5 MG/1; MG/1
TABLET ORAL
Qty: 180 TABLET | Refills: 1 | OUTPATIENT
Start: 2022-05-02

## 2022-05-02 RX ORDER — LANSOPRAZOLE 30 MG/1
30 CAPSULE, DELAYED RELEASE ORAL DAILY
Qty: 90 CAPSULE | Refills: 1
Start: 2022-05-02

## 2022-05-02 RX ORDER — MINERAL OIL
180 ENEMA (ML) RECTAL DAILY
Qty: 90 TABLET | Refills: 1
Start: 2022-05-02 | End: 2023-05-02

## 2022-05-02 RX ORDER — BENAZEPRIL HYDROCHLORIDE AND HYDROCHLOROTHIAZIDE 20; 12.5 MG/1; MG/1
TABLET ORAL
Qty: 180 TABLET | Refills: 1 | Status: SHIPPED | OUTPATIENT
Start: 2022-05-02 | End: 2022-05-04 | Stop reason: SDUPTHER

## 2022-05-02 NOTE — TELEPHONE ENCOUNTER
----- Message from Eric Soto sent at 5/2/2022  8:48 AM CDT -----  Regarding: Send previous prescriptions to new pharmacy  Pt need prescriptions sent [facility] on [address] in [place]. The store [place] [id number]. Please recall the prescriptions from [facility].

## 2022-05-02 NOTE — TELEPHONE ENCOUNTER
Requested Prescriptions     Pending Prescriptions Disp Refills    benazepril-hydrochlorthiazide (LOTENSIN HCT) 20-12.5 mg per tablet 180 tablet 1     Sig: TAKE 1 TABLET TWICE DAILY    fexofenadine (ALLEGRA) 180 MG tablet 90 tablet 1     Sig: Take 1 tablet (180 mg total) by mouth once daily. For sinus and allergy symptoms    lansoprazole (PREVACID) 30 MG capsule 90 capsule 1     Sig: Take 1 capsule (30 mg total) by mouth once daily.    rosuvastatin (CRESTOR) 40 MG Tab 90 tablet 1     Sig: Take 1 tablet (40 mg total) by mouth every evening.       Please review and sign pended orders. Pharmacy has been updated.

## 2022-05-02 NOTE — TELEPHONE ENCOUNTER
Pt needs prescriptions sent [facility] on [address] in [place]. Please recall the prescriptions from [facility].

## 2022-05-03 DIAGNOSIS — E78.2 COMBINED HYPERLIPIDEMIA ASSOCIATED WITH TYPE 2 DIABETES MELLITUS: Primary | ICD-10-CM

## 2022-05-03 DIAGNOSIS — E11.69 COMBINED HYPERLIPIDEMIA ASSOCIATED WITH TYPE 2 DIABETES MELLITUS: Primary | ICD-10-CM

## 2022-05-03 NOTE — PROGRESS NOTES
Results have been reviewed via [facility]uma-[facility]. Please verify that these have been viewed by patient. If not, please call patient with results.    Please schedule the following orders:  6m lipids    I have sent a msg to patient with the following interpretation (see below):      Dear Mr.Elmer JEANNE Marinelli       I have reviewed your recent blood work.       Your complete blood count is nml, no anemia. Mild reduced WBC which is ok. Will repeat in 6-12m  Your metabolic panel which shows your electrolytes, glucose, kidney function, and liver function is nml  Thyroid function is nml  A1c is normal (labs to screen for prediabetes and diabetes)   Your PSA (prostate cancer screen) is normal        Your cholesterol is elevated. Please be sure to take your crestor everyday to reduced the risk of heart attack and stroke. We will repeat in 6m. Cont to limit fatty foods; participate in regular exercise.   The 10-year ASCVD risk score (Zumbrotaedith DE LA CURZ Jr., et al., 2013) is: 31%    Values used to calculate the score:      Age: 66 years      Sex: Male      Is Non- : Yes      Diabetic: No      Tobacco smoker: No      Systolic Blood Pressure: 182 mmHg      Is BP treated: Yes      HDL Cholesterol: 40 mg/dL      Total Cholesterol: 232 mg/dL      Please do not hesitate to call or message with any additional questions or concerns    Ness Cai MD

## 2022-05-04 ENCOUNTER — TELEPHONE (OUTPATIENT)
Dept: FAMILY MEDICINE | Facility: CLINIC | Age: 67
End: 2022-05-04
Payer: COMMERCIAL

## 2022-05-04 DIAGNOSIS — I15.2 HYPERTENSION ASSOCIATED WITH DIABETES: ICD-10-CM

## 2022-05-04 DIAGNOSIS — E11.59 HYPERTENSION ASSOCIATED WITH DIABETES: ICD-10-CM

## 2022-05-04 RX ORDER — BENAZEPRIL HYDROCHLORIDE AND HYDROCHLOROTHIAZIDE 20; 12.5 MG/1; MG/1
TABLET ORAL
Qty: 60 TABLET | Refills: 1 | Status: SHIPPED | OUTPATIENT
Start: 2022-05-04 | End: 2022-07-09

## 2022-05-04 NOTE — TELEPHONE ENCOUNTER
----- Message from Jen Graves sent at 5/4/2022  4:04 PM CDT -----  Contact: RAFAEL RAPHAEL [[id number]] @ [phone]  Correction patient need a 30 day supply not a 90 day

## 2022-05-04 NOTE — TELEPHONE ENCOUNTER
----- Message from Jen Graves sent at 5/4/2022  3:58 PM CDT -----  Contact: RAFAEL RAPHAEL [[id number]]@   [phone]  Patient insurance will not cover 30 days. It must be a 90 day supply. Please call [facility] Phone: [phone] Fax: [phone]  with a new Rx  for  benazepril-hydrochlorthiazide (LOTENSIN HCT) 20-12.5 mg per tablet     90 day supply.

## 2022-05-30 ENCOUNTER — PATIENT MESSAGE (OUTPATIENT)
Dept: FAMILY MEDICINE | Facility: CLINIC | Age: 67
End: 2022-05-30
Payer: COMMERCIAL

## 2022-05-31 ENCOUNTER — OFFICE VISIT (OUTPATIENT)
Dept: FAMILY MEDICINE | Facility: CLINIC | Age: 67
End: 2022-05-31
Payer: COMMERCIAL

## 2022-05-31 VITALS
TEMPERATURE: 97 F | HEIGHT: 73 IN | SYSTOLIC BLOOD PRESSURE: 122 MMHG | OXYGEN SATURATION: 98 % | BODY MASS INDEX: 40.32 KG/M2 | DIASTOLIC BLOOD PRESSURE: 74 MMHG | RESPIRATION RATE: 16 BRPM | HEART RATE: 84 BPM | WEIGHT: 304.25 LBS

## 2022-05-31 DIAGNOSIS — Z12.11 COLON CANCER SCREENING: Primary | ICD-10-CM

## 2022-05-31 DIAGNOSIS — E11.59 HYPERTENSION ASSOCIATED WITH DIABETES: ICD-10-CM

## 2022-05-31 DIAGNOSIS — E11.49 TYPE 2 DIABETES MELLITUS WITH NEUROLOGICAL MANIFESTATIONS: ICD-10-CM

## 2022-05-31 DIAGNOSIS — E11.69 COMBINED HYPERLIPIDEMIA ASSOCIATED WITH TYPE 2 DIABETES MELLITUS: ICD-10-CM

## 2022-05-31 DIAGNOSIS — E78.2 COMBINED HYPERLIPIDEMIA ASSOCIATED WITH TYPE 2 DIABETES MELLITUS: ICD-10-CM

## 2022-05-31 DIAGNOSIS — I15.2 HYPERTENSION ASSOCIATED WITH DIABETES: ICD-10-CM

## 2022-05-31 PROCEDURE — 3008F BODY MASS INDEX DOCD: CPT | Mod: CPTII,S$GLB,, | Performed by: STUDENT IN AN ORGANIZED HEALTH CARE EDUCATION/TRAINING PROGRAM

## 2022-05-31 PROCEDURE — 3078F DIAST BP <80 MM HG: CPT | Mod: CPTII,S$GLB,, | Performed by: STUDENT IN AN ORGANIZED HEALTH CARE EDUCATION/TRAINING PROGRAM

## 2022-05-31 PROCEDURE — 99999 PR PBB SHADOW E&M-EST. PATIENT-LVL IV: ICD-10-PCS | Mod: PBBFAC,,, | Performed by: STUDENT IN AN ORGANIZED HEALTH CARE EDUCATION/TRAINING PROGRAM

## 2022-05-31 PROCEDURE — 3044F HG A1C LEVEL LT 7.0%: CPT | Mod: CPTII,S$GLB,, | Performed by: STUDENT IN AN ORGANIZED HEALTH CARE EDUCATION/TRAINING PROGRAM

## 2022-05-31 PROCEDURE — 1159F PR MEDICATION LIST DOCUMENTED IN MEDICAL RECORD: ICD-10-PCS | Mod: CPTII,S$GLB,, | Performed by: STUDENT IN AN ORGANIZED HEALTH CARE EDUCATION/TRAINING PROGRAM

## 2022-05-31 PROCEDURE — 4010F PR ACE/ARB THEARPY RXD/TAKEN: ICD-10-PCS | Mod: CPTII,S$GLB,, | Performed by: STUDENT IN AN ORGANIZED HEALTH CARE EDUCATION/TRAINING PROGRAM

## 2022-05-31 PROCEDURE — 1159F MED LIST DOCD IN RCRD: CPT | Mod: CPTII,S$GLB,, | Performed by: STUDENT IN AN ORGANIZED HEALTH CARE EDUCATION/TRAINING PROGRAM

## 2022-05-31 PROCEDURE — 3074F PR MOST RECENT SYSTOLIC BLOOD PRESSURE < 130 MM HG: ICD-10-PCS | Mod: CPTII,S$GLB,, | Performed by: STUDENT IN AN ORGANIZED HEALTH CARE EDUCATION/TRAINING PROGRAM

## 2022-05-31 PROCEDURE — 3078F PR MOST RECENT DIASTOLIC BLOOD PRESSURE < 80 MM HG: ICD-10-PCS | Mod: CPTII,S$GLB,, | Performed by: STUDENT IN AN ORGANIZED HEALTH CARE EDUCATION/TRAINING PROGRAM

## 2022-05-31 PROCEDURE — 99213 OFFICE O/P EST LOW 20 MIN: CPT | Mod: S$GLB,,, | Performed by: STUDENT IN AN ORGANIZED HEALTH CARE EDUCATION/TRAINING PROGRAM

## 2022-05-31 PROCEDURE — 3074F SYST BP LT 130 MM HG: CPT | Mod: CPTII,S$GLB,, | Performed by: STUDENT IN AN ORGANIZED HEALTH CARE EDUCATION/TRAINING PROGRAM

## 2022-05-31 PROCEDURE — 99999 PR PBB SHADOW E&M-EST. PATIENT-LVL IV: CPT | Mod: PBBFAC,,, | Performed by: STUDENT IN AN ORGANIZED HEALTH CARE EDUCATION/TRAINING PROGRAM

## 2022-05-31 PROCEDURE — 4010F ACE/ARB THERAPY RXD/TAKEN: CPT | Mod: CPTII,S$GLB,, | Performed by: STUDENT IN AN ORGANIZED HEALTH CARE EDUCATION/TRAINING PROGRAM

## 2022-05-31 PROCEDURE — 3044F PR MOST RECENT HEMOGLOBIN A1C LEVEL <7.0%: ICD-10-PCS | Mod: CPTII,S$GLB,, | Performed by: STUDENT IN AN ORGANIZED HEALTH CARE EDUCATION/TRAINING PROGRAM

## 2022-05-31 PROCEDURE — 1160F RVW MEDS BY RX/DR IN RCRD: CPT | Mod: CPTII,S$GLB,, | Performed by: STUDENT IN AN ORGANIZED HEALTH CARE EDUCATION/TRAINING PROGRAM

## 2022-05-31 PROCEDURE — 99213 PR OFFICE/OUTPT VISIT, EST, LEVL III, 20-29 MIN: ICD-10-PCS | Mod: S$GLB,,, | Performed by: STUDENT IN AN ORGANIZED HEALTH CARE EDUCATION/TRAINING PROGRAM

## 2022-05-31 PROCEDURE — 3008F PR BODY MASS INDEX (BMI) DOCUMENTED: ICD-10-PCS | Mod: CPTII,S$GLB,, | Performed by: STUDENT IN AN ORGANIZED HEALTH CARE EDUCATION/TRAINING PROGRAM

## 2022-05-31 PROCEDURE — 1160F PR REVIEW ALL MEDS BY PRESCRIBER/CLIN PHARMACIST DOCUMENTED: ICD-10-PCS | Mod: CPTII,S$GLB,, | Performed by: STUDENT IN AN ORGANIZED HEALTH CARE EDUCATION/TRAINING PROGRAM

## 2022-05-31 NOTE — PROGRESS NOTES
Problem List Items Addressed This Visit        Cardiac/Vascular    Hypertension associated with diabetes    Overview     - at goal today; taking meds as rxd  - Current Hypertension Medications:   Hypertension Medications             benazepril-hydrochlorthiazide (LOTENSIN HCT) 20-12.5 mg per tablet TAKE 1 TABLET TWICE DAILY        -continue lifestyle modification with low sodium diet and exercise   -discussed hypertension disease course and importance of treating high blood pressure  -patient understood and advised of risk of untreated blood pressure.  ER precautions were given   for symptoms of hypertensive urgency and emergency.             Combined hyperlipidemia associated with type 2 diabetes mellitus    Overview     -chronic condition  -reports compliance with hyperlipidemia treatment as prescribed; will repeat lipids in 6-12 months  -denies any known adverse effects of medications  -recent labs listed below:  Lab Results   Component Value Date    CHOL 232 (H) 05/02/2022     Lab Results   Component Value Date    HDL 40 05/02/2022     Lab Results   Component Value Date    LDLCALC 170.4 (H) 05/02/2022     Lab Results   Component Value Date    TRIG 108 05/02/2022     Lab Results   Component Value Date    ALT 14 05/02/2022    AST 16 05/02/2022    ALKPHOS 81 05/02/2022    BILITOT 0.6 05/02/2022                   Endocrine    Type 2 diabetes mellitus with neurological manifestations    Overview     Well-controlled, diet controlled  Lab Results   Component Value Date    HGBA1C 5.9 (H) 05/02/2022     BG home readings: n/a  Hypoglycemic Events: n/a    -current meds: n/a, diet-controlled     -on statin:   Hyperlipidemia Medications             rosuvastatin (CRESTOR) 40 MG Tab Take 1 tablet (40 mg total) by mouth every evening.        -on ACE-I/ARB:   Hypertension Medications             benazepril-hydrochlorthiazide (LOTENSIN HCT) 20-12.5 mg per tablet TAKE 1 TABLET TWICE DAILY        -counseling provided on importance  of diabetic diet and medication compliance in order to treat diabetes  -discussed diabetes disease course and potential complications  Follow up 6-12 months                Other Visit Diagnoses     Colon cancer screening    -  Primary    Relevant Orders    Ambulatory referral/consult to Endo Procedure             Patient ID: Marcos Marinelli is a 66 y.o. male.    Chief Complaint:  Follow up     Has a hx of  has a past medical history of Anxiety, Arthritis, Cataract, Colon polyp (11/13/2012), Combined hyperlipidemia associated with type 2 diabetes mellitus (2/28/2015), ED (erectile dysfunction), Environmental allergies, GERD (gastroesophageal reflux disease), Hiatal hernia (11/13/2012), Hyperglycemia, Hyperlipidemia LDL goal < 130, Hypertension goal BP (blood pressure) < 140/90, Insomnia, Strabismus, Torn meniscus (w-2), and Tremor.   Reports he has been taking all meds as rxd.     Reports home BP 120s/70s. doing well. No concerns today.     Denies fevers, chills, chest pain, SOB, fatigue, abdominal pain, nausea, vomiting, dysuria, hematuria, hematochezia, or melena.       Colonoscopy: hx of colonic polyps, due for repeat CSC, ordered. No personal history of colon cancer, hematochezia, melena, crohn's, ulcerative colitis; No family history of colon cancer.  CSC ordered, he would like to wait until fall.       Health Maintenance Topics with due status: Not Due       Topic Last Completion Date    PROSTATE-SPECIFIC ANTIGEN 05/02/2022    Lipid Panel 05/02/2022        ==============================================  History reviewed.   Health Maintenance Due   Topic Date Due    Colorectal Cancer Screening  11/13/2017       Past Medical History:  Past Medical History:   Diagnosis Date    Anxiety     Arthritis     carpal tunnel    Cataract     Colon polyp 11/13/2012    repeat colonosopy in 5 years.    Combined hyperlipidemia associated with type 2 diabetes mellitus 2/28/2015    ED (erectile dysfunction)      Environmental allergies     GERD (gastroesophageal reflux disease)     Hiatal hernia 11/13/2012    found on EGD    Hyperglycemia     Hyperlipidemia LDL goal < 130     Hypertension goal BP (blood pressure) < 140/90     Insomnia     Strabismus     od as child//    Torn meniscus w-2    right knee    Tremor     controlled with xanax     Past Surgical History:   Procedure Laterality Date    ESOPHAGOGASTRODUODENOSCOPY  11/13/2012    SKIN LESION EXCISION      removed from head     Review of patient's allergies indicates:   Allergen Reactions    Codeine      Other reaction(s): Unknown  Other reaction(s): Unknown    Escitalopram Other (See Comments)     Other reaction(s): Headache    Iodine     Penicillin      Other reaction(s): Unknown    Penicillins      Other reaction(s): Unknown    Bactrim [sulfamethoxazole-trimethoprim] Rash    Sulfa (sulfonamide antibiotics) Nausea Only and Rash     Current Outpatient Medications on File Prior to Visit   Medication Sig Dispense Refill    benazepril-hydrochlorthiazide (LOTENSIN HCT) 20-12.5 mg per tablet TAKE 1 TABLET TWICE DAILY 60 tablet 1    fexofenadine (ALLEGRA) 180 MG tablet Take 1 tablet (180 mg total) by mouth once daily. For sinus and allergy symptoms 90 tablet 0    lansoprazole (PREVACID) 30 MG capsule Take 1 capsule (30 mg total) by mouth once daily. 90 capsule 1    rosuvastatin (CRESTOR) 40 MG Tab Take 1 tablet (40 mg total) by mouth every evening. 90 tablet 1     No current facility-administered medications on file prior to visit.     Social History     Socioeconomic History    Marital status:    Tobacco Use    Smoking status: Never Smoker    Smokeless tobacco: Never Used   Substance and Sexual Activity    Alcohol use: No    Drug use: No    Sexual activity: Yes     Partners: Female     Family History   Problem Relation Age of Onset    Heart attack Mother     Hypertension Mother     Heart attack Father     Hypertension Father      Brain cancer Father     Cancer Father     Glaucoma Father     Glaucoma Paternal Aunt     Amblyopia Neg Hx     Blindness Neg Hx     Cataracts Neg Hx     Diabetes Neg Hx     Retinal detachment Neg Hx     Strabismus Neg Hx     Stroke Neg Hx     Thyroid disease Neg Hx     Macular degeneration Neg Hx           Review of Systems   12 point review of systems per hpi, otherwise negative         Objective:    Nursing note and vitals reviewed.  Vitals:    05/31/22 0840   BP: 122/74   Pulse: 84   Resp: 16   Temp: 97.2 °F (36.2 °C)     Body mass index is 40.14 kg/m².     Physical Exam   Constitutional:oriented to person, place, and time. appears well-developed and well-nourished. No distress.   HENT: WNL, poor dentition  Head: Normocephalic and atraumatic.   Eyes: Pupils are equal, round, and reactive to light. EOM are normal.   Neck: Normal range of motion. Neck supple.   Cardiovascular: Normal rate, regular rhythm, normal heart sounds and intact distal pulses.   No murmur heard.  Pulmonary/Chest: Effort normal and breath sounds normal. No respiratory distress. no wheezes.   GI: soft, non distended, no ttp, no rebound/guarding  Musculoskeletal: Normal range of motion. no edema.   Neurological: alert and oriented to person, place, and time. No cranial nerve deficit.   Skin: Skin is warm and dry. Capillary refill takes less than 2 seconds.   Psychiatric: normal mood and affect. behavior is normal.           Ness Cai MD    We Offer Telehealth & Same Day Appointments!   Book your Telehealth appointment with me through my nurse or   Clinic appointments on [employer]!  [phone]     To Schedule appointments online, go to [employer]: [email or web address]

## 2022-06-16 ENCOUNTER — PATIENT OUTREACH (OUTPATIENT)
Dept: ADMINISTRATIVE | Facility: HOSPITAL | Age: 67
End: 2022-06-16
Payer: COMMERCIAL

## 2022-07-05 ENCOUNTER — HOSPITAL ENCOUNTER (OUTPATIENT)
Dept: PREADMISSION TESTING | Facility: HOSPITAL | Age: 67
Discharge: HOME | End: 2022-07-05
Attending: STUDENT IN AN ORGANIZED HEALTH CARE EDUCATION/TRAINING PROGRAM
Payer: COMMERCIAL

## 2022-07-05 DIAGNOSIS — Z12.11 COLON CANCER SCREENING: ICD-10-CM

## 2022-07-09 DIAGNOSIS — E11.59 HYPERTENSION ASSOCIATED WITH DIABETES: ICD-10-CM

## 2022-07-09 DIAGNOSIS — I15.2 HYPERTENSION ASSOCIATED WITH DIABETES: ICD-10-CM

## 2022-07-09 RX ORDER — BENAZEPRIL HYDROCHLORIDE AND HYDROCHLOROTHIAZIDE 20; 12.5 MG/1; MG/1
TABLET ORAL
Qty: 60 TABLET | Refills: 3 | Status: SHIPPED | OUTPATIENT
Start: 2022-07-09 | End: 2022-08-24

## 2022-08-24 ENCOUNTER — OFFICE VISIT (OUTPATIENT)
Dept: FAMILY MEDICINE | Facility: CLINIC | Age: 67
End: 2022-08-24
Payer: COMMERCIAL

## 2022-08-24 VITALS
BODY MASS INDEX: 41.71 KG/M2 | HEIGHT: 73 IN | WEIGHT: 314.69 LBS | TEMPERATURE: 97 F | HEART RATE: 83 BPM | SYSTOLIC BLOOD PRESSURE: 138 MMHG | DIASTOLIC BLOOD PRESSURE: 84 MMHG | RESPIRATION RATE: 18 BRPM | OXYGEN SATURATION: 96 %

## 2022-08-24 DIAGNOSIS — E11.59 HYPERTENSION ASSOCIATED WITH DIABETES: ICD-10-CM

## 2022-08-24 DIAGNOSIS — I15.2 HYPERTENSION ASSOCIATED WITH DIABETES: ICD-10-CM

## 2022-08-24 DIAGNOSIS — R01.1 SYSTOLIC EJECTION MURMUR: ICD-10-CM

## 2022-08-24 DIAGNOSIS — K21.9 GASTROESOPHAGEAL REFLUX DISEASE WITHOUT ESOPHAGITIS: ICD-10-CM

## 2022-08-24 DIAGNOSIS — E11.49 TYPE 2 DIABETES MELLITUS WITH NEUROLOGICAL MANIFESTATIONS: ICD-10-CM

## 2022-08-24 DIAGNOSIS — Z12.11 COLON CANCER SCREENING: ICD-10-CM

## 2022-08-24 DIAGNOSIS — Z86.010 HISTORY OF COLON POLYPS: ICD-10-CM

## 2022-08-24 DIAGNOSIS — E78.2 COMBINED HYPERLIPIDEMIA ASSOCIATED WITH TYPE 2 DIABETES MELLITUS: ICD-10-CM

## 2022-08-24 DIAGNOSIS — R05.9 COUGH: Primary | ICD-10-CM

## 2022-08-24 DIAGNOSIS — Z11.59 NEED FOR HEPATITIS C SCREENING TEST: ICD-10-CM

## 2022-08-24 DIAGNOSIS — E78.5 HYPERLIPIDEMIA, UNSPECIFIED HYPERLIPIDEMIA TYPE: ICD-10-CM

## 2022-08-24 DIAGNOSIS — Z23 IMMUNIZATION DUE: ICD-10-CM

## 2022-08-24 DIAGNOSIS — E11.69 COMBINED HYPERLIPIDEMIA ASSOCIATED WITH TYPE 2 DIABETES MELLITUS: ICD-10-CM

## 2022-08-24 LAB
ALBUMIN/CREAT UR: 11.8 UG/MG (ref 0–30)
CREAT UR-MCNC: 51 MG/DL (ref 23–375)
CTP QC/QA: YES
MICROALBUMIN UR DL<=1MG/L-MCNC: 6 UG/ML
SARS-COV-2 RDRP RESP QL NAA+PROBE: NEGATIVE

## 2022-08-24 PROCEDURE — 4010F PR ACE/ARB THEARPY RXD/TAKEN: ICD-10-PCS | Mod: CPTII,S$GLB,, | Performed by: STUDENT IN AN ORGANIZED HEALTH CARE EDUCATION/TRAINING PROGRAM

## 2022-08-24 PROCEDURE — 82570 ASSAY OF URINE CREATININE: CPT | Performed by: STUDENT IN AN ORGANIZED HEALTH CARE EDUCATION/TRAINING PROGRAM

## 2022-08-24 PROCEDURE — 3008F BODY MASS INDEX DOCD: CPT | Mod: CPTII,S$GLB,, | Performed by: STUDENT IN AN ORGANIZED HEALTH CARE EDUCATION/TRAINING PROGRAM

## 2022-08-24 PROCEDURE — 1159F PR MEDICATION LIST DOCUMENTED IN MEDICAL RECORD: ICD-10-PCS | Mod: CPTII,S$GLB,, | Performed by: STUDENT IN AN ORGANIZED HEALTH CARE EDUCATION/TRAINING PROGRAM

## 2022-08-24 PROCEDURE — 4010F ACE/ARB THERAPY RXD/TAKEN: CPT | Mod: CPTII,S$GLB,, | Performed by: STUDENT IN AN ORGANIZED HEALTH CARE EDUCATION/TRAINING PROGRAM

## 2022-08-24 PROCEDURE — 99999 PR PBB SHADOW E&M-EST. PATIENT-LVL V: ICD-10-PCS | Mod: PBBFAC,,, | Performed by: STUDENT IN AN ORGANIZED HEALTH CARE EDUCATION/TRAINING PROGRAM

## 2022-08-24 PROCEDURE — 3044F PR MOST RECENT HEMOGLOBIN A1C LEVEL <7.0%: ICD-10-PCS | Mod: CPTII,S$GLB,, | Performed by: STUDENT IN AN ORGANIZED HEALTH CARE EDUCATION/TRAINING PROGRAM

## 2022-08-24 PROCEDURE — 3079F PR MOST RECENT DIASTOLIC BLOOD PRESSURE 80-89 MM HG: ICD-10-PCS | Mod: CPTII,S$GLB,, | Performed by: STUDENT IN AN ORGANIZED HEALTH CARE EDUCATION/TRAINING PROGRAM

## 2022-08-24 PROCEDURE — G0009 PNEUMOCOCCAL CONJUGATE VACCINE 20-VALENT: ICD-10-PCS | Mod: S$GLB,ICN,, | Performed by: STUDENT IN AN ORGANIZED HEALTH CARE EDUCATION/TRAINING PROGRAM

## 2022-08-24 PROCEDURE — 3044F HG A1C LEVEL LT 7.0%: CPT | Mod: CPTII,S$GLB,, | Performed by: STUDENT IN AN ORGANIZED HEALTH CARE EDUCATION/TRAINING PROGRAM

## 2022-08-24 PROCEDURE — 3288F PR FALLS RISK ASSESSMENT DOCUMENTED: ICD-10-PCS | Mod: CPTII,S$GLB,, | Performed by: STUDENT IN AN ORGANIZED HEALTH CARE EDUCATION/TRAINING PROGRAM

## 2022-08-24 PROCEDURE — 99999 PR PBB SHADOW E&M-EST. PATIENT-LVL V: CPT | Mod: PBBFAC,,, | Performed by: STUDENT IN AN ORGANIZED HEALTH CARE EDUCATION/TRAINING PROGRAM

## 2022-08-24 PROCEDURE — 3079F DIAST BP 80-89 MM HG: CPT | Mod: CPTII,S$GLB,, | Performed by: STUDENT IN AN ORGANIZED HEALTH CARE EDUCATION/TRAINING PROGRAM

## 2022-08-24 PROCEDURE — 3008F PR BODY MASS INDEX (BMI) DOCUMENTED: ICD-10-PCS | Mod: CPTII,S$GLB,, | Performed by: STUDENT IN AN ORGANIZED HEALTH CARE EDUCATION/TRAINING PROGRAM

## 2022-08-24 PROCEDURE — G0009 ADMIN PNEUMOCOCCAL VACCINE: HCPCS | Mod: S$GLB,ICN,, | Performed by: STUDENT IN AN ORGANIZED HEALTH CARE EDUCATION/TRAINING PROGRAM

## 2022-08-24 PROCEDURE — 99214 OFFICE O/P EST MOD 30 MIN: CPT | Mod: S$GLB,,, | Performed by: STUDENT IN AN ORGANIZED HEALTH CARE EDUCATION/TRAINING PROGRAM

## 2022-08-24 PROCEDURE — 90677 PNEUMOCOCCAL CONJUGATE VACCINE 20-VALENT: ICD-10-PCS | Mod: S$GLB,ICN,, | Performed by: STUDENT IN AN ORGANIZED HEALTH CARE EDUCATION/TRAINING PROGRAM

## 2022-08-24 PROCEDURE — 3288F FALL RISK ASSESSMENT DOCD: CPT | Mod: CPTII,S$GLB,, | Performed by: STUDENT IN AN ORGANIZED HEALTH CARE EDUCATION/TRAINING PROGRAM

## 2022-08-24 PROCEDURE — 1101F PT FALLS ASSESS-DOCD LE1/YR: CPT | Mod: CPTII,S$GLB,, | Performed by: STUDENT IN AN ORGANIZED HEALTH CARE EDUCATION/TRAINING PROGRAM

## 2022-08-24 PROCEDURE — 1159F MED LIST DOCD IN RCRD: CPT | Mod: CPTII,S$GLB,, | Performed by: STUDENT IN AN ORGANIZED HEALTH CARE EDUCATION/TRAINING PROGRAM

## 2022-08-24 PROCEDURE — U0002: ICD-10-PCS | Mod: QW,S$GLB,ICN, | Performed by: STUDENT IN AN ORGANIZED HEALTH CARE EDUCATION/TRAINING PROGRAM

## 2022-08-24 PROCEDURE — 90677 PCV20 VACCINE IM: CPT | Mod: S$GLB,ICN,, | Performed by: STUDENT IN AN ORGANIZED HEALTH CARE EDUCATION/TRAINING PROGRAM

## 2022-08-24 PROCEDURE — 3075F SYST BP GE 130 - 139MM HG: CPT | Mod: CPTII,S$GLB,, | Performed by: STUDENT IN AN ORGANIZED HEALTH CARE EDUCATION/TRAINING PROGRAM

## 2022-08-24 PROCEDURE — U0002 COVID-19 LAB TEST NON-CDC: HCPCS | Mod: QW,S$GLB,ICN, | Performed by: STUDENT IN AN ORGANIZED HEALTH CARE EDUCATION/TRAINING PROGRAM

## 2022-08-24 PROCEDURE — 3075F PR MOST RECENT SYSTOLIC BLOOD PRESS GE 130-139MM HG: ICD-10-PCS | Mod: CPTII,S$GLB,, | Performed by: STUDENT IN AN ORGANIZED HEALTH CARE EDUCATION/TRAINING PROGRAM

## 2022-08-24 PROCEDURE — 99214 PR OFFICE/OUTPT VISIT, EST, LEVL IV, 30-39 MIN: ICD-10-PCS | Mod: S$GLB,,, | Performed by: STUDENT IN AN ORGANIZED HEALTH CARE EDUCATION/TRAINING PROGRAM

## 2022-08-24 PROCEDURE — 1101F PR PT FALLS ASSESS DOC 0-1 FALLS W/OUT INJ PAST YR: ICD-10-PCS | Mod: CPTII,S$GLB,, | Performed by: STUDENT IN AN ORGANIZED HEALTH CARE EDUCATION/TRAINING PROGRAM

## 2022-08-24 RX ORDER — HYDROCHLOROTHIAZIDE 12.5 MG/1
12.5 TABLET ORAL DAILY
Qty: 90 TABLET | Refills: 3 | Status: SHIPPED | OUTPATIENT
Start: 2022-08-24 | End: 2022-08-24

## 2022-08-24 RX ORDER — ROSUVASTATIN CALCIUM 40 MG/1
40 TABLET, COATED ORAL NIGHTLY
Qty: 90 TABLET | Refills: 3 | Status: SHIPPED | OUTPATIENT
Start: 2022-08-24 | End: 2023-02-01

## 2022-08-24 RX ORDER — PANTOPRAZOLE SODIUM 40 MG/1
40 TABLET, DELAYED RELEASE ORAL DAILY
Qty: 30 TABLET | Refills: 11 | Status: SHIPPED | OUTPATIENT
Start: 2022-08-24 | End: 2023-08-24

## 2022-08-24 RX ORDER — LOSARTAN POTASSIUM AND HYDROCHLOROTHIAZIDE 12.5; 5 MG/1; MG/1
1 TABLET ORAL DAILY
Qty: 90 TABLET | Refills: 3 | Status: SHIPPED | OUTPATIENT
Start: 2022-08-24 | End: 2022-08-25 | Stop reason: SDUPTHER

## 2022-08-24 NOTE — PROGRESS NOTES
Problem List Items Addressed This Visit        Pulmonary    Cough - Primary    Overview     New onset, dry cough 3-4 weeks   COVID neg 08/24/2022  - switch to ARB from ACEi  - switch to Protonix from prn prevacid  - nightly allergy tab  Prn follow up for CXR if needed            Relevant Medications    pantoprazole (PROTONIX) 40 MG tablet    Other Relevant Orders    POCT COVID-19 Rapid Screening (Completed)       Cardiac/Vascular    Hypertension associated with diabetes    Overview     - at goal today; however, will switch to losartan as pt with dry cough    - Current Hypertension Medications:   Hypertension Medications             losartan-hydrochlorothiazide 50-12.5 mg (HYZAAR) 50-12.5 mg per tablet Take 1 tablet by mouth once daily.        -continue lifestyle modification with low sodium diet and exercise   -discussed hypertension disease course and importance of treating high blood pressure  -patient understood and advised of risk of untreated blood pressure.  ER precautions were given   for symptoms of hypertensive urgency and emergency.             Relevant Medications    losartan-hydrochlorothiazide 50-12.5 mg (HYZAAR) 50-12.5 mg per tablet    Combined hyperlipidemia associated with type 2 diabetes mellitus    Overview     -chronic condition  -reports compliance with hyperlipidemia treatment as prescribed; will repeat lipids in 6-12 months  -denies any known adverse effects of medications  -recent labs listed below:  Lab Results   Component Value Date    CHOL 232 (H) 05/02/2022     Lab Results   Component Value Date    HDL 40 05/02/2022     Lab Results   Component Value Date    LDLCALC 170.4 (H) 05/02/2022     Lab Results   Component Value Date    TRIG 108 05/02/2022     Lab Results   Component Value Date    ALT 14 05/02/2022    AST 16 05/02/2022    ALKPHOS 81 05/02/2022    BILITOT 0.6 05/02/2022                   Endocrine    Type 2 diabetes mellitus with neurological manifestations    Overview      Well-controlled, diet controlled  Declines foot exam today  Lab Results   Component Value Date    HGBA1C 5.9 (H) 05/02/2022     BG home readings: n/a  Hypoglycemic Events: n/a    -current meds: n/a, diet-controlled     -on statin:   Hyperlipidemia Medications             rosuvastatin (CRESTOR) 40 MG Tab Take 1 tablet (40 mg total) by mouth every evening.        -on ACE-I/ARB:   Hypertension Medications             Losartan hctz TAKE 1 TABLET TWICE DAILY        -counseling provided on importance of diabetic diet and medication compliance in order to treat diabetes  -discussed diabetes disease course and potential complications  Follow up 6-12 months              Relevant Orders    Ambulatory referral/consult to Optometry    Microalbumin/Creatinine Ratio, Urine       GI    Gastroesophageal reflux disease without esophagitis    Overview     -symptoms controlled with prn PPI and avoidance of tomatoes; however, with new nighttime dry cough; stop prevacid, start Protonix     -denies alarm symptoms, such as dysphagia, weight loss or N/V  -continue lifestyle modification with avoidance of acidic foods, caffeine, late night eating             Relevant Medications    pantoprazole (PROTONIX) 40 MG tablet    History of colon polyps    Overview     Due for repeat CSC, ordered              Other Visit Diagnoses     Hyperlipidemia, unspecified hyperlipidemia type        Relevant Medications    rosuvastatin (CRESTOR) 40 MG Tab    Colon cancer screening        Relevant Orders    Case Request Endoscopy: COLONOSCOPY (Completed)    Need for hepatitis C screening test        Immunization due        Relevant Orders    (In Office Administered) Pneumococcal Conjugate Vaccine (20 Valent) (IM) (Completed)    Systolic ejection murmur        Relevant Orders    Echo              Follow up in about 3 months (around 11/24/2022) for in office.    Ness Cai  MD  _________________________________________________________________________      Patient ID: Marcos Marinelli is a 67 y.o. male.    Chief Complaint:  Dry cough  Reports 3-4 weeks of dry cough, seldomly productive of clear sputum. Denies fevers, chills, chest pain, SOB, fatigue, abdominal pain, nausea, vomiting, dysuria, hematuria, hematochezia, or melena. Cough worse at night. Has hx of GERD. Taking prevacid OTC.     Reports has taken home COVID test Saturday, negative.     Reports he sleeps on 4 pillows. If lies flat, he feels he cant get his neck comfortable. Denies SOB when lying flat.       Past medical histories reviewed, including past medical, surgical, family and social histories.      Current Outpatient Medications on File Prior to Visit   Medication Sig Dispense Refill    fexofenadine (ALLEGRA) 180 MG tablet Take 1 tablet (180 mg total) by mouth once daily. For sinus and allergy symptoms 90 tablet 0    [DISCONTINUED] benazepril-hydrochlorthiazide (LOTENSIN HCT) 20-12.5 mg per tablet TAKE 1 TABLET BY MOUTH TWICE DAILY 60 tablet 3    [DISCONTINUED] lansoprazole (PREVACID) 30 MG capsule Take 1 capsule (30 mg total) by mouth once daily. 90 capsule 1    [DISCONTINUED] rosuvastatin (CRESTOR) 40 MG Tab Take 1 tablet (40 mg total) by mouth every evening. 90 tablet 1     No current facility-administered medications on file prior to visit.       Review of Systems   12 point review of systems negative except for listed in HPI.     Objective:    Nursing note and vitals reviewed.  Vitals:    08/24/22 1408   BP: 138/84   Pulse: 83   Resp: 18   Temp: 97.3 °F (36.3 °C)     Body mass index is 41.52 kg/m².     Physical Exam   Constitutional: oriented to person, place, and time. well-developed and well-nourished. No distress.   HENT: WNL  Head: Normocephalic and atraumatic.   Eyes: EOM are normal.   Neck: Normal range of motion. Neck supple.   Cardiovascular: Normal rate, CHRIS  Pulmonary/Chest: Effort normal. No  respiratory distress. CTAB  Musculoskeletal: Normal range of motion. no edema.   Neurological: CN II-XII intact  Skin: warm and dry.   Psychiatric: normal mood and affect. behavior is normal.           We Offer Telehealth & Same Day Appointments!   Book your Telehealth appointment with me through my nurse or   Clinic appointments on [employer]!  [phone]     To Schedule appointments online, go to [employer]: [email or web address]

## 2022-08-25 DIAGNOSIS — E11.59 HYPERTENSION ASSOCIATED WITH DIABETES: ICD-10-CM

## 2022-08-25 DIAGNOSIS — I15.2 HYPERTENSION ASSOCIATED WITH DIABETES: ICD-10-CM

## 2022-08-25 RX ORDER — LOSARTAN POTASSIUM AND HYDROCHLOROTHIAZIDE 12.5; 5 MG/1; MG/1
1 TABLET ORAL DAILY
Qty: 90 TABLET | Refills: 3 | Status: SHIPPED | OUTPATIENT
Start: 2022-08-25 | End: 2023-09-13

## 2022-08-25 NOTE — TELEPHONE ENCOUNTER
----- Message from Noemi Obrien sent at 8/25/2022  4:29 PM CDT -----  Contact: mike  Type:  Pharmacy Calling to Clarify an RX    Name of Caller: basil  Pharmacy Name:[facility]   Prescription Name:HCTZ   What do they need to clarify?: two different prescriptions was sent over for pt with HCTZ in them. Pharmacy needs to know which to fill   Best Call Back Number:[phone]  Additional Information: pt needs prior authorization  also needs to know if the pt has stop taking benazepril HCTZ         Thanks   KB

## 2022-08-26 ENCOUNTER — TELEPHONE (OUTPATIENT)
Dept: FAMILY MEDICINE | Facility: CLINIC | Age: 67
End: 2022-08-26
Payer: COMMERCIAL

## 2022-08-26 NOTE — TELEPHONE ENCOUNTER
----- Message from January Gaming sent at 8/26/2022  2:10 PM CDT -----  Contact: Miriam  Type:  Pharmacy Calling to Clarify an RX    Name of Caller:Miriam   Pharmacy Name: [facility]   Prescription Name: she states that it's blood pressure medication (would not provide the name).  What do they need to clarify?: There's a duplication of the prescription   Best Call Back Number: [phone]   Additional Information:  She states that they actually need a call back, and not another prescription to be sent over. Miriam is requesting a call back as soon as possible.

## 2022-08-27 ENCOUNTER — NURSE TRIAGE (OUTPATIENT)
Dept: ADMINISTRATIVE | Facility: CLINIC | Age: 67
End: 2022-08-27
Payer: COMMERCIAL

## 2022-08-27 ENCOUNTER — OFFICE VISIT (OUTPATIENT)
Dept: URGENT CARE | Facility: CLINIC | Age: 67
End: 2022-08-27
Payer: COMMERCIAL

## 2022-08-27 VITALS
HEIGHT: 73 IN | HEART RATE: 72 BPM | SYSTOLIC BLOOD PRESSURE: 164 MMHG | BODY MASS INDEX: 41.62 KG/M2 | OXYGEN SATURATION: 97 % | WEIGHT: 314 LBS | RESPIRATION RATE: 18 BRPM | DIASTOLIC BLOOD PRESSURE: 100 MMHG | TEMPERATURE: 98 F

## 2022-08-27 DIAGNOSIS — J32.9 BACTERIAL SINUSITIS: Primary | ICD-10-CM

## 2022-08-27 DIAGNOSIS — B96.89 BACTERIAL SINUSITIS: Primary | ICD-10-CM

## 2022-08-27 PROCEDURE — 3066F NEPHROPATHY DOC TX: CPT | Mod: CPTII,S$GLB,, | Performed by: PHYSICIAN ASSISTANT

## 2022-08-27 PROCEDURE — 3080F DIAST BP >= 90 MM HG: CPT | Mod: CPTII,S$GLB,, | Performed by: PHYSICIAN ASSISTANT

## 2022-08-27 PROCEDURE — 4010F PR ACE/ARB THEARPY RXD/TAKEN: ICD-10-PCS | Mod: CPTII,S$GLB,, | Performed by: PHYSICIAN ASSISTANT

## 2022-08-27 PROCEDURE — 1159F MED LIST DOCD IN RCRD: CPT | Mod: CPTII,S$GLB,, | Performed by: PHYSICIAN ASSISTANT

## 2022-08-27 PROCEDURE — 1160F PR REVIEW ALL MEDS BY PRESCRIBER/CLIN PHARMACIST DOCUMENTED: ICD-10-PCS | Mod: CPTII,S$GLB,, | Performed by: PHYSICIAN ASSISTANT

## 2022-08-27 PROCEDURE — 3077F SYST BP >= 140 MM HG: CPT | Mod: CPTII,S$GLB,, | Performed by: PHYSICIAN ASSISTANT

## 2022-08-27 PROCEDURE — 3061F NEG MICROALBUMINURIA REV: CPT | Mod: CPTII,S$GLB,, | Performed by: PHYSICIAN ASSISTANT

## 2022-08-27 PROCEDURE — 1160F RVW MEDS BY RX/DR IN RCRD: CPT | Mod: CPTII,S$GLB,, | Performed by: PHYSICIAN ASSISTANT

## 2022-08-27 PROCEDURE — 3008F PR BODY MASS INDEX (BMI) DOCUMENTED: ICD-10-PCS | Mod: CPTII,S$GLB,, | Performed by: PHYSICIAN ASSISTANT

## 2022-08-27 PROCEDURE — 99213 OFFICE O/P EST LOW 20 MIN: CPT | Mod: S$GLB,,, | Performed by: PHYSICIAN ASSISTANT

## 2022-08-27 PROCEDURE — 3008F BODY MASS INDEX DOCD: CPT | Mod: CPTII,S$GLB,, | Performed by: PHYSICIAN ASSISTANT

## 2022-08-27 PROCEDURE — 99213 PR OFFICE/OUTPT VISIT, EST, LEVL III, 20-29 MIN: ICD-10-PCS | Mod: S$GLB,,, | Performed by: PHYSICIAN ASSISTANT

## 2022-08-27 PROCEDURE — 3044F PR MOST RECENT HEMOGLOBIN A1C LEVEL <7.0%: ICD-10-PCS | Mod: CPTII,S$GLB,, | Performed by: PHYSICIAN ASSISTANT

## 2022-08-27 PROCEDURE — 3077F PR MOST RECENT SYSTOLIC BLOOD PRESSURE >= 140 MM HG: ICD-10-PCS | Mod: CPTII,S$GLB,, | Performed by: PHYSICIAN ASSISTANT

## 2022-08-27 PROCEDURE — 1159F PR MEDICATION LIST DOCUMENTED IN MEDICAL RECORD: ICD-10-PCS | Mod: CPTII,S$GLB,, | Performed by: PHYSICIAN ASSISTANT

## 2022-08-27 PROCEDURE — 3066F PR DOCUMENTATION OF TREATMENT FOR NEPHROPATHY: ICD-10-PCS | Mod: CPTII,S$GLB,, | Performed by: PHYSICIAN ASSISTANT

## 2022-08-27 PROCEDURE — 3080F PR MOST RECENT DIASTOLIC BLOOD PRESSURE >= 90 MM HG: ICD-10-PCS | Mod: CPTII,S$GLB,, | Performed by: PHYSICIAN ASSISTANT

## 2022-08-27 PROCEDURE — 3044F HG A1C LEVEL LT 7.0%: CPT | Mod: CPTII,S$GLB,, | Performed by: PHYSICIAN ASSISTANT

## 2022-08-27 PROCEDURE — 4010F ACE/ARB THERAPY RXD/TAKEN: CPT | Mod: CPTII,S$GLB,, | Performed by: PHYSICIAN ASSISTANT

## 2022-08-27 PROCEDURE — 3061F PR NEG MICROALBUMINURIA RESULT DOCUMENTED/REVIEW: ICD-10-PCS | Mod: CPTII,S$GLB,, | Performed by: PHYSICIAN ASSISTANT

## 2022-08-27 RX ORDER — DOXYCYCLINE 100 MG/1
100 CAPSULE ORAL 2 TIMES DAILY
Qty: 14 CAPSULE | Refills: 0 | Status: SHIPPED | OUTPATIENT
Start: 2022-08-27 | End: 2022-09-03

## 2022-08-27 NOTE — PROGRESS NOTES
"Subjective:       Patient ID: Marcos Marinelli is a 67 y.o. male.    Vitals:  height is 6' 1" (1.854 m) and weight is 142.4 kg (314 lb) (abnormal). His oral temperature is 97.8 °F (36.6 °C). His blood pressure is 164/100 (abnormal) and his pulse is 72. His respiration is 18 and oxygen saturation is 97%.     Chief Complaint: URI    Pt presents today with congestion and sinus problems x's 1 week. Pt was seen by PCP x's 3 days was prescribed medication with no relief. Pt has also tried warm compresses for relief. Pt tested negative for COVID at primary care x's 3 days. Has been using mucinex without relief.     URI   This is a new problem. The current episode started 1 to 4 weeks ago. The problem has been unchanged. There has been no fever. Associated symptoms include congestion, coughing, sinus pain and a sore throat.     Constitution: Negative for chills and fever.   HENT:  Positive for congestion, sinus pain and sore throat.    Respiratory:  Positive for cough. Negative for shortness of breath.      Objective:      Physical Exam   Constitutional: He is oriented to person, place, and time. He appears well-developed. He is cooperative.  Non-toxic appearance. He does not appear ill. No distress.   HENT:   Head: Normocephalic and atraumatic.   Ears:   Right Ear: Hearing, tympanic membrane, external ear and ear canal normal.   Left Ear: Hearing, tympanic membrane, external ear and ear canal normal.   Nose: Right sinus exhibits maxillary sinus tenderness. Right sinus exhibits no frontal sinus tenderness. Left sinus exhibits maxillary sinus tenderness. Left sinus exhibits no frontal sinus tenderness.   Mouth/Throat: Oropharynx is clear and moist and mucous membranes are normal. Mucous membranes are moist. No oropharyngeal exudate or posterior oropharyngeal erythema. Oropharynx is clear.   Eyes: Conjunctivae and lids are normal. Right eye exhibits no discharge. Left eye exhibits no discharge. No scleral icterus.   Neck: " Trachea normal and phonation normal. Neck supple. No edema present. No erythema present. No neck rigidity present.   Cardiovascular: Normal rate, regular rhythm, normal heart sounds and normal pulses.   Pulmonary/Chest: Effort normal and breath sounds normal. No respiratory distress. He has no decreased breath sounds. He has no wheezes. He has no rhonchi. He has no rales.   Abdominal: Normal appearance.   Musculoskeletal: Normal range of motion.         General: No deformity. Normal range of motion.   Neurological: He is alert and oriented to person, place, and time. He exhibits normal muscle tone. Coordination normal.   Skin: Skin is warm, dry, intact, not diaphoretic and not pale. jaundice  Psychiatric: His speech is normal and behavior is normal. Mood, judgment and thought content normal.   Nursing note and vitals reviewed.      Assessment:       1. Bacterial sinusitis          Plan:         Bacterial sinusitis    Other orders  -     doxycycline (VIBRAMYCIN) 100 MG Cap; Take 1 capsule (100 mg total) by mouth 2 (two) times daily. for 7 days  Dispense: 14 capsule; Refill: 0       Sinusitis in Adults   The Basics   Written by the doctors and editors at [facility]   What is sinusitis? -- Sinusitis is a condition that can cause a stuffy nose, pain in the face, and discharge (mucus) from the nose. The sinuses are hollow areas in the bones of the face (figure 1). They have a thin lining that normally makes a small amount of mucus. When this lining gets irritated or infected, it swells and makes extra mucus. This causes symptoms.  Sinusitis can occur when a person gets sick with a cold. The germs causing the cold can also infect the sinuses. Many times, a person feels like their cold is getting better. But then they get sinusitis and begin to feel sick again.  What are the symptoms of sinusitis? -- Common symptoms of sinusitis include:  Stuffy or blocked nose  Thick white, yellow, or green discharge from the nose  Pain in  the teeth  Pain or pressure in the face - This often feels worse when a person bends forward.  People with sinusitis can also have other symptoms that include:  Fever  Cough  Trouble smelling  Ear pressure or fullness  Headache  Bad breath  Feeling tired  Most of the time, symptoms start to improve in 7 to 10 days.  Should I see a doctor or nurse? -- See your doctor or nurse if your symptoms last more than 10 days, or if your symptoms first get better but then get worse.  Rarely, sinusitis can lead to serious problems. See your doctor or nurse right away (do not wait 10 days) if you have:  Fever higher than 102°F (38.9°C)  Sudden and severe pain in the face and head  Trouble seeing or seeing double  Trouble thinking clearly  Swelling or redness around one or both eyes  A stiff neck  Is there anything I can do on my own to feel better? -- Yes. To reduce your symptoms, you can:  Take an over-the-counter pain reliever to reduce the pain  Rinse your nose and sinuses with salt water a few times a day - Ask your doctor or nurse about the best way to do this.  Your doctor might also prescribe a steroid nose spray to reduce the swelling in your nose. (These kinds of steroid nose sprays are safe to take, and do not contain the same steroids that some athletes take illegally.)  How is sinusitis treated? -- Most of the time, sinusitis does not need to be treated with antibiotic medicines. This is because most sinusitis is caused by viruses, not bacteria, and antibiotics do not kill viruses. In fact, even sinusitis caused by bacteria will usually get better on its own without antibiotics.   Some people with sinusitis do need treatment with antibiotics. If your symptoms have not improved after 10 days, ask your doctor if you should take antibiotics. Your doctor might recommend that you wait 1 more week to see if your symptoms improve. But if you have symptoms such as a fever or a lot of pain, they might prescribe antibiotics. It  "is important to follow your doctor's instructions about taking your antibiotics.  What if my symptoms do not get better? -- If your symptoms do not get better, talk with your doctor or nurse. They might order tests to figure out why you still have symptoms. These can include:  CT scan or other imaging tests - Imaging tests create pictures of the inside of the body.  A test to look inside the sinuses - For this test, a doctor puts a thin tube with a camera on the end into the nose and up into the sinuses.  Some people get a lot of sinus infections or have symptoms that last at least 3 months. These people can have a different type of sinusitis called "chronic sinusitis." Chronic sinusitis can be caused by different things. For example, some people have growths inside their nose or sinuses that are called "polyps." Other people have allergies that cause their symptoms.  Chronic sinusitis can be treated in different ways. If you have chronic sinusitis, talk with your doctor about which treatments are right for you.  All topics are updated as new evidence becomes available and our peer review process is complete.  This topic retrieved from [employer] on: Sep 21, 2021.  Topic 45480 Version 17.0  Release: 29.4.2 - C29.263  © 2021 UpToDate, Inc. and/or its affiliates. All rights reserved.  figure 1: Sinuses of the face     This drawing shows the sinuses of the face, from the side and front views.  Graphic 647831 Version 1.0     Consumer Information Use and Disclaimer   This information is not specific medical advice and does not replace information you receive from your health care provider. This is only a brief summary of general information. It does NOT include all information about conditions, illnesses, injuries, tests, procedures, treatments, therapies, discharge instructions or life-style choices that may apply to you. You must talk with your health care provider for complete information about your health and treatment " options. This information should not be used to decide whether or not to accept your health care provider's advice, instructions or recommendations. Only your health care provider has the knowledge and training to provide advice that is right for you. The use of this information is governed by the [employer] End User License Agreement, available at https://www.[employer]/en/solutions/[employer]/about/justin.The use of [employer] content is governed by the [employer] Terms of Use. ©2021 [employer] All rights reserved.  Copyright   © 2021 UpToDate, Inc. and/or its affiliates. All rights reserved.

## 2022-08-27 NOTE — TELEPHONE ENCOUNTER
"Reason for Disposition   [1] SEVERE pain AND [2] not improved 2 hours after pain medicine    Additional Information   Negative: SEVERE difficulty breathing (e.g., struggling for each breath, speaks in single words)   Negative: Sounds like a life-threatening emergency to the triager   Negative: [1] Sinus infection AND [2] taking an antibiotic AND [3] symptoms continue   Negative: [1] Difficulty breathing AND [2] not from stuffy nose (e.g., not relieved by cleaning out the nose)   Negative: [1] SEVERE headache AND [2] fever     Does not have thermometer   Negative: [1] Redness or swelling on the cheek, forehead or around the eye AND [2] fever   Negative: Fever > 104 F (40 C)   Negative: Patient sounds very sick or weak to the triager    Protocols used: Sinus Pain or Congestion-A-AH    Pt's spouse stated he saw his doctor in the office for "sinus issues" and he is not getting any better. Pt reports sinus pain with not relief from OTC medication. Per triage protocol advised to see a MD within 4 hrs. Pt verbalized understanding and will go to [facility].  "

## 2022-08-29 ENCOUNTER — HOSPITAL ENCOUNTER (OUTPATIENT)
Dept: CARDIOLOGY | Facility: HOSPITAL | Age: 67
Discharge: HOME | End: 2022-08-29
Attending: STUDENT IN AN ORGANIZED HEALTH CARE EDUCATION/TRAINING PROGRAM
Payer: COMMERCIAL

## 2022-08-29 VITALS
HEART RATE: 79 BPM | BODY MASS INDEX: 41.62 KG/M2 | WEIGHT: 314 LBS | DIASTOLIC BLOOD PRESSURE: 84 MMHG | HEIGHT: 73 IN | SYSTOLIC BLOOD PRESSURE: 138 MMHG

## 2022-08-29 DIAGNOSIS — R01.1 SYSTOLIC EJECTION MURMUR: ICD-10-CM

## 2022-08-29 LAB
AORTIC ROOT ANNULUS: 4.46 CM
ASCENDING AORTA: 3.02 CM
AV INDEX (PROSTH): 0.78
AV MEAN GRADIENT: 3 MMHG
AV PEAK GRADIENT: 6 MMHG
AV VALVE AREA: 3.91 CM2
AV VELOCITY RATIO: 0.76
BSA FOR ECHO PROCEDURE: 2.71 M2
CV ECHO LV RWT: 0.66 CM
DOP CALC AO PEAK VEL: 1.18 M/S
DOP CALC AO VTI: 22.6 CM
DOP CALC LVOT AREA: 5 CM2
DOP CALC LVOT DIAMETER: 2.53 CM
DOP CALC LVOT PEAK VEL: 0.9 M/S
DOP CALC LVOT STROKE VOLUME: 88.43 CM3
DOP CALC RVOT PEAK VEL: 0.8 M/S
DOP CALC RVOT VTI: 15.7 CM
DOP CALCLVOT PEAK VEL VTI: 17.6 CM
E WAVE DECELERATION TIME: 200.87 MSEC
E/A RATIO: 0.66
E/E' RATIO: 9.08 M/S
ECHO LV POSTERIOR WALL: 1.5 CM (ref 0.6–1.1)
EJECTION FRACTION: 55 %
FRACTIONAL SHORTENING: 31 % (ref 28–44)
INTERVENTRICULAR SEPTUM: 1.53 CM (ref 0.6–1.1)
IVRT: 151.28 MSEC
LA MAJOR: 5.91 CM
LA MINOR: 3.98 CM
LA WIDTH: 4 CM
LEFT ATRIUM SIZE: 3.75 CM
LEFT ATRIUM VOLUME INDEX MOD: 14.5 ML/M2
LEFT ATRIUM VOLUME INDEX: 23.2 ML/M2
LEFT ATRIUM VOLUME MOD: 37.91 CM3
LEFT ATRIUM VOLUME: 60.65 CM3
LEFT INTERNAL DIMENSION IN SYSTOLE: 3.14 CM (ref 2.1–4)
LEFT VENTRICLE DIASTOLIC VOLUME INDEX: 36.64 ML/M2
LEFT VENTRICLE DIASTOLIC VOLUME: 95.62 ML
LEFT VENTRICLE MASS INDEX: 110 G/M2
LEFT VENTRICLE SYSTOLIC VOLUME INDEX: 15 ML/M2
LEFT VENTRICLE SYSTOLIC VOLUME: 39.19 ML
LEFT VENTRICULAR INTERNAL DIMENSION IN DIASTOLE: 4.57 CM (ref 3.5–6)
LEFT VENTRICULAR MASS: 286.42 G
LV LATERAL E/E' RATIO: 11.8 M/S
LV SEPTAL E/E' RATIO: 7.38 M/S
LVOT MG: 2.03 MMHG
LVOT MV: 0.67 CM/S
MV PEAK A VEL: 0.89 M/S
MV PEAK E VEL: 0.59 M/S
MV STENOSIS PRESSURE HALF TIME: 58.25 MS
MV VALVE AREA P 1/2 METHOD: 3.78 CM2
PULM VEIN S/D RATIO: 1.33
PV MEAN GRADIENT: 1.57 MMHG
PV PEAK D VEL: 0.36 M/S
PV PEAK S VEL: 0.48 M/S
PV PEAK VELOCITY: 0.94 CM/S
RA MAJOR: 4.09 CM
RA WIDTH: 3 CM
RIGHT VENTRICULAR END-DIASTOLIC DIMENSION: 2.52 CM
SINUS: 4.24 CM
STJ: 3.7 CM
TDI LATERAL: 0.05 M/S
TDI SEPTAL: 0.08 M/S
TDI: 0.07 M/S
TRICUSPID ANNULAR PLANE SYSTOLIC EXCURSION: 2.11 CM

## 2022-08-29 PROCEDURE — 93306 ECHO (CUPID ONLY): ICD-10-PCS | Mod: 26,,, | Performed by: INTERNAL MEDICINE

## 2022-08-29 PROCEDURE — 93306 TTE W/DOPPLER COMPLETE: CPT | Mod: 26,,, | Performed by: INTERNAL MEDICINE

## 2022-08-29 PROCEDURE — 93306 TTE W/DOPPLER COMPLETE: CPT | Mod: PO

## 2022-08-31 DIAGNOSIS — I77.810 AORTIC ROOT DILATATION: Primary | ICD-10-CM

## 2022-09-26 DIAGNOSIS — Z12.11 COLON CANCER SCREENING: Primary | ICD-10-CM

## 2022-11-02 ENCOUNTER — LAB VISIT (OUTPATIENT)
Dept: LAB | Facility: HOSPITAL | Age: 67
End: 2022-11-02
Attending: STUDENT IN AN ORGANIZED HEALTH CARE EDUCATION/TRAINING PROGRAM
Payer: MEDICARE

## 2022-11-02 DIAGNOSIS — E78.2 COMBINED HYPERLIPIDEMIA ASSOCIATED WITH TYPE 2 DIABETES MELLITUS: ICD-10-CM

## 2022-11-02 DIAGNOSIS — E11.69 COMBINED HYPERLIPIDEMIA ASSOCIATED WITH TYPE 2 DIABETES MELLITUS: ICD-10-CM

## 2022-11-02 LAB
CHOLEST SERPL-MCNC: 148 MG/DL (ref 120–199)
CHOLEST/HDLC SERPL: 4 {RATIO} (ref 2–5)
HDLC SERPL-MCNC: 37 MG/DL (ref 40–75)
HDLC SERPL: 25 % (ref 20–50)
LDLC SERPL CALC-MCNC: 89.4 MG/DL (ref 63–159)
NONHDLC SERPL-MCNC: 111 MG/DL
TRIGL SERPL-MCNC: 108 MG/DL (ref 30–150)

## 2022-11-02 PROCEDURE — 80061 LIPID PANEL: CPT | Performed by: STUDENT IN AN ORGANIZED HEALTH CARE EDUCATION/TRAINING PROGRAM

## 2022-11-02 PROCEDURE — 36415 COLL VENOUS BLD VENIPUNCTURE: CPT | Mod: PO | Performed by: STUDENT IN AN ORGANIZED HEALTH CARE EDUCATION/TRAINING PROGRAM

## 2022-11-02 NOTE — PROGRESS NOTES
I have sent a msg to patient with the following interpretation (see below):    Your cholesterol EXCELLENT!!. Continue cholesterol medications. Continue to limit fatty foods; participate in regular exercise.     Please do not hesitate to call or message with any questions or concerns    Ness Cai MD

## 2022-11-16 DIAGNOSIS — E11.9 TYPE 2 DIABETES MELLITUS WITHOUT COMPLICATION: ICD-10-CM

## 2022-11-22 ENCOUNTER — TELEPHONE (OUTPATIENT)
Dept: FAMILY MEDICINE | Facility: CLINIC | Age: 67
End: 2022-11-22
Payer: MEDICARE

## 2022-11-22 NOTE — TELEPHONE ENCOUNTER
----- Message from Deborah Pritchett sent at 11/22/2022 11:45 AM CST -----  Regarding: appt request  Name of Who is Calling:RAFAEL RAPHAEL [[id number]]          What is the request in detail: pt isn't feeling well possibly sinus infection          Can the clinic reply by [facility]: no           What Number to Call Back if not in [facility]:[phone] (home)

## 2022-11-23 ENCOUNTER — OFFICE VISIT (OUTPATIENT)
Dept: FAMILY MEDICINE | Facility: CLINIC | Age: 67
End: 2022-11-23
Payer: MEDICARE

## 2022-11-23 VITALS
SYSTOLIC BLOOD PRESSURE: 138 MMHG | HEART RATE: 85 BPM | TEMPERATURE: 98 F | WEIGHT: 315 LBS | BODY MASS INDEX: 41.75 KG/M2 | OXYGEN SATURATION: 97 % | DIASTOLIC BLOOD PRESSURE: 88 MMHG | HEIGHT: 73 IN

## 2022-11-23 DIAGNOSIS — Z12.11 COLON CANCER SCREENING: ICD-10-CM

## 2022-11-23 DIAGNOSIS — R09.81 NASAL CONGESTION: Primary | ICD-10-CM

## 2022-11-23 DIAGNOSIS — E11.49 TYPE 2 DIABETES MELLITUS WITH NEUROLOGICAL MANIFESTATIONS: ICD-10-CM

## 2022-11-23 LAB
CTP QC/QA: YES
CTP QC/QA: YES
FLUAV AG NPH QL: NEGATIVE
FLUBV AG NPH QL: NEGATIVE
SARS-COV-2 RDRP RESP QL NAA+PROBE: NEGATIVE

## 2022-11-23 PROCEDURE — 3061F PR NEG MICROALBUMINURIA RESULT DOCUMENTED/REVIEW: ICD-10-PCS | Mod: CPTII,S$GLB,, | Performed by: STUDENT IN AN ORGANIZED HEALTH CARE EDUCATION/TRAINING PROGRAM

## 2022-11-23 PROCEDURE — 99999 PR PBB SHADOW E&M-EST. PATIENT-LVL V: ICD-10-PCS | Mod: PBBFAC,,, | Performed by: STUDENT IN AN ORGANIZED HEALTH CARE EDUCATION/TRAINING PROGRAM

## 2022-11-23 PROCEDURE — 1160F PR REVIEW ALL MEDS BY PRESCRIBER/CLIN PHARMACIST DOCUMENTED: ICD-10-PCS | Mod: CPTII,S$GLB,, | Performed by: STUDENT IN AN ORGANIZED HEALTH CARE EDUCATION/TRAINING PROGRAM

## 2022-11-23 PROCEDURE — 87635 SARS-COV-2 COVID-19 AMP PRB: CPT | Mod: QW,S$GLB,, | Performed by: STUDENT IN AN ORGANIZED HEALTH CARE EDUCATION/TRAINING PROGRAM

## 2022-11-23 PROCEDURE — 99213 OFFICE O/P EST LOW 20 MIN: CPT | Mod: S$GLB,,, | Performed by: STUDENT IN AN ORGANIZED HEALTH CARE EDUCATION/TRAINING PROGRAM

## 2022-11-23 PROCEDURE — 4010F PR ACE/ARB THEARPY RXD/TAKEN: ICD-10-PCS | Mod: CPTII,S$GLB,, | Performed by: STUDENT IN AN ORGANIZED HEALTH CARE EDUCATION/TRAINING PROGRAM

## 2022-11-23 PROCEDURE — 3066F PR DOCUMENTATION OF TREATMENT FOR NEPHROPATHY: ICD-10-PCS | Mod: CPTII,S$GLB,, | Performed by: STUDENT IN AN ORGANIZED HEALTH CARE EDUCATION/TRAINING PROGRAM

## 2022-11-23 PROCEDURE — 1159F MED LIST DOCD IN RCRD: CPT | Mod: CPTII,S$GLB,, | Performed by: STUDENT IN AN ORGANIZED HEALTH CARE EDUCATION/TRAINING PROGRAM

## 2022-11-23 PROCEDURE — 4010F ACE/ARB THERAPY RXD/TAKEN: CPT | Mod: CPTII,S$GLB,, | Performed by: STUDENT IN AN ORGANIZED HEALTH CARE EDUCATION/TRAINING PROGRAM

## 2022-11-23 PROCEDURE — 3008F BODY MASS INDEX DOCD: CPT | Mod: CPTII,S$GLB,, | Performed by: STUDENT IN AN ORGANIZED HEALTH CARE EDUCATION/TRAINING PROGRAM

## 2022-11-23 PROCEDURE — 1159F PR MEDICATION LIST DOCUMENTED IN MEDICAL RECORD: ICD-10-PCS | Mod: CPTII,S$GLB,, | Performed by: STUDENT IN AN ORGANIZED HEALTH CARE EDUCATION/TRAINING PROGRAM

## 2022-11-23 PROCEDURE — 87804 POCT INFLUENZA A/B: ICD-10-PCS | Mod: QW,S$GLB,, | Performed by: STUDENT IN AN ORGANIZED HEALTH CARE EDUCATION/TRAINING PROGRAM

## 2022-11-23 PROCEDURE — 3044F HG A1C LEVEL LT 7.0%: CPT | Mod: CPTII,S$GLB,, | Performed by: STUDENT IN AN ORGANIZED HEALTH CARE EDUCATION/TRAINING PROGRAM

## 2022-11-23 PROCEDURE — 3079F DIAST BP 80-89 MM HG: CPT | Mod: CPTII,S$GLB,, | Performed by: STUDENT IN AN ORGANIZED HEALTH CARE EDUCATION/TRAINING PROGRAM

## 2022-11-23 PROCEDURE — 3008F PR BODY MASS INDEX (BMI) DOCUMENTED: ICD-10-PCS | Mod: CPTII,S$GLB,, | Performed by: STUDENT IN AN ORGANIZED HEALTH CARE EDUCATION/TRAINING PROGRAM

## 2022-11-23 PROCEDURE — 3075F SYST BP GE 130 - 139MM HG: CPT | Mod: CPTII,S$GLB,, | Performed by: STUDENT IN AN ORGANIZED HEALTH CARE EDUCATION/TRAINING PROGRAM

## 2022-11-23 PROCEDURE — 87804 INFLUENZA ASSAY W/OPTIC: CPT | Mod: QW,S$GLB,, | Performed by: STUDENT IN AN ORGANIZED HEALTH CARE EDUCATION/TRAINING PROGRAM

## 2022-11-23 PROCEDURE — 99213 PR OFFICE/OUTPT VISIT, EST, LEVL III, 20-29 MIN: ICD-10-PCS | Mod: S$GLB,,, | Performed by: STUDENT IN AN ORGANIZED HEALTH CARE EDUCATION/TRAINING PROGRAM

## 2022-11-23 PROCEDURE — 1101F PT FALLS ASSESS-DOCD LE1/YR: CPT | Mod: CPTII,S$GLB,, | Performed by: STUDENT IN AN ORGANIZED HEALTH CARE EDUCATION/TRAINING PROGRAM

## 2022-11-23 PROCEDURE — 3061F NEG MICROALBUMINURIA REV: CPT | Mod: CPTII,S$GLB,, | Performed by: STUDENT IN AN ORGANIZED HEALTH CARE EDUCATION/TRAINING PROGRAM

## 2022-11-23 PROCEDURE — 3066F NEPHROPATHY DOC TX: CPT | Mod: CPTII,S$GLB,, | Performed by: STUDENT IN AN ORGANIZED HEALTH CARE EDUCATION/TRAINING PROGRAM

## 2022-11-23 PROCEDURE — 3079F PR MOST RECENT DIASTOLIC BLOOD PRESSURE 80-89 MM HG: ICD-10-PCS | Mod: CPTII,S$GLB,, | Performed by: STUDENT IN AN ORGANIZED HEALTH CARE EDUCATION/TRAINING PROGRAM

## 2022-11-23 PROCEDURE — 1160F RVW MEDS BY RX/DR IN RCRD: CPT | Mod: CPTII,S$GLB,, | Performed by: STUDENT IN AN ORGANIZED HEALTH CARE EDUCATION/TRAINING PROGRAM

## 2022-11-23 PROCEDURE — 3044F PR MOST RECENT HEMOGLOBIN A1C LEVEL <7.0%: ICD-10-PCS | Mod: CPTII,S$GLB,, | Performed by: STUDENT IN AN ORGANIZED HEALTH CARE EDUCATION/TRAINING PROGRAM

## 2022-11-23 PROCEDURE — 3288F FALL RISK ASSESSMENT DOCD: CPT | Mod: CPTII,S$GLB,, | Performed by: STUDENT IN AN ORGANIZED HEALTH CARE EDUCATION/TRAINING PROGRAM

## 2022-11-23 PROCEDURE — 87635: ICD-10-PCS | Mod: QW,S$GLB,, | Performed by: STUDENT IN AN ORGANIZED HEALTH CARE EDUCATION/TRAINING PROGRAM

## 2022-11-23 PROCEDURE — 3075F PR MOST RECENT SYSTOLIC BLOOD PRESS GE 130-139MM HG: ICD-10-PCS | Mod: CPTII,S$GLB,, | Performed by: STUDENT IN AN ORGANIZED HEALTH CARE EDUCATION/TRAINING PROGRAM

## 2022-11-23 PROCEDURE — 3288F PR FALLS RISK ASSESSMENT DOCUMENTED: ICD-10-PCS | Mod: CPTII,S$GLB,, | Performed by: STUDENT IN AN ORGANIZED HEALTH CARE EDUCATION/TRAINING PROGRAM

## 2022-11-23 PROCEDURE — 99999 PR PBB SHADOW E&M-EST. PATIENT-LVL V: CPT | Mod: PBBFAC,,, | Performed by: STUDENT IN AN ORGANIZED HEALTH CARE EDUCATION/TRAINING PROGRAM

## 2022-11-23 PROCEDURE — 1101F PR PT FALLS ASSESS DOC 0-1 FALLS W/OUT INJ PAST YR: ICD-10-PCS | Mod: CPTII,S$GLB,, | Performed by: STUDENT IN AN ORGANIZED HEALTH CARE EDUCATION/TRAINING PROGRAM

## 2022-11-23 NOTE — PROGRESS NOTES
SUBJECTIVE:   Marcos Marinelli is a 67 y.o. male who complains of congestion, nasal blockage, post nasal drip, headache, generalized sinus pain, and hoarseness for 4 days. He denies a history of chills, shortness of breath, vomiting, weakness and does have a history of asthma. Patient does not smoke cigarettes. Wife with similar symptoms. Reports mucinex and tylenol with some relief.      OBJECTIVE:  There were no vitals filed for this visit.  Constitutional:  No acute distress  HEENT:  Head normocephalic and atraumatic. PERRL, EOMI. No scleral icterus or erythema.   R ear:  The pinna and canal without ttp, erythema, or edema. The ear canal is clear without discharge. The tympanic membrane is normal in appearance with a good cone of light.   L ear:  The pinna and canal without ttp, erythema, or edema. The ear canal is clear without discharge. The tympanic membrane is normal in appearance with a good cone of light.   Pharynx moist mild erythema, no exudate.  Neck: Normal range of motion. Neck supple. No LAD  Cardiovascular: Normal rate  Pulmonary/Chest: Effort normal. No respiratory distress. CTAB, no wheezing  Musculoskeletal: Normal range of motion. no edema.   Neurological: CN II-XII intact  Skin: warm and dry.   Psychiatric: normal mood and affect. behavior is normal. .    ASSESSMENT:   viral upper respiratory illness    PLAN:  Symptomatic therapy suggested: push fluids, rest, and return office visit prn if symptoms persist or worsen. Lack of antibiotic effectiveness discussed with him. Call or return to clinic prn if these symptoms worsen or fail to improve as anticipated.      Orders Placed This Encounter   Procedures    Ambulatory referral/consult to Optometry     Standing Status:   Future     Standing Expiration Date:   12/23/2023     Referral Priority:   Routine     Referral Type:   Vision (Optometry)     Referral Reason:   Specialty Services Required     Requested Specialty:   Optometry     Number of  Visits Requested:   1    Ambulatory referral/consult to Endo Procedure      Standing Status:   Future     Standing Expiration Date:   5/23/2023     Referral Priority:   Routine     Referral Type:   Consultation     Number of Visits Requested:   1    POCT COVID-19 Rapid Screening     Order Specific Question:   Is the patient symptomatic?     Answer:   Yes    POCT Influenza A/B

## 2022-11-28 ENCOUNTER — HOSPITAL ENCOUNTER (OUTPATIENT)
Dept: PREADMISSION TESTING | Facility: HOSPITAL | Age: 67
Discharge: HOME | End: 2022-11-28
Payer: MEDICARE

## 2022-11-28 DIAGNOSIS — Z12.11 COLON CANCER SCREENING: Primary | ICD-10-CM

## 2022-12-20 ENCOUNTER — TELEPHONE (OUTPATIENT)
Dept: OPTOMETRY | Facility: CLINIC | Age: 67
End: 2022-12-20
Payer: MEDICARE

## 2023-01-05 ENCOUNTER — TELEPHONE (OUTPATIENT)
Dept: FAMILY MEDICINE | Facility: CLINIC | Age: 68
End: 2023-01-05
Payer: MEDICARE

## 2023-01-05 DIAGNOSIS — Z12.11 COLON CANCER SCREENING: Primary | ICD-10-CM

## 2023-01-05 NOTE — TELEPHONE ENCOUNTER
----- Message from Shaynasegundo Velamanpreet sent at 1/5/2023 12:26 PM CST -----  Regarding: Appt  Contact: patient  Type:  Sooner Appointment Request    Caller is requesting a sooner appointment.  Caller declined first available appointment listed below.  Caller will not accept being placed on the waitlist and is requesting a message be sent to doctor.    Name of Caller:  WifeJaneen  When is the first available appointment?    Symptoms:  Colonoscopy  Best Call Back Number:  [phone]     Additional Information:  Wife states the colonoscopy is scheduled for [place] and would like for it to be changed to [place]. Please call wife if you have any additional questions. Thanks!

## 2023-01-05 NOTE — TELEPHONE ENCOUNTER
I have signed for the following orders AND/OR meds.  Please call the patient and ask the patient to schedule the testing AND/OR inform about any medications that were sent.      Orders Placed This Encounter   Procedures    Ambulatory referral/consult to Endo Procedure      Standing Status:   Future     Standing Expiration Date:   7/5/2023     Referral Priority:   Routine     Referral Type:   Consultation     Referred to Provider:   Francisco Esteves MD     Number of Visits Requested:   1

## 2023-02-01 ENCOUNTER — OFFICE VISIT (OUTPATIENT)
Dept: OPTOMETRY | Facility: CLINIC | Age: 68
End: 2023-02-01
Payer: MEDICARE

## 2023-02-01 DIAGNOSIS — H52.4 MYOPIA WITH ASTIGMATISM AND PRESBYOPIA, BILATERAL: ICD-10-CM

## 2023-02-01 DIAGNOSIS — H52.203 MYOPIA WITH ASTIGMATISM AND PRESBYOPIA, BILATERAL: ICD-10-CM

## 2023-02-01 DIAGNOSIS — E11.36 CATARACT ASSOCIATED WITH TYPE 2 DIABETES MELLITUS: ICD-10-CM

## 2023-02-01 DIAGNOSIS — H52.13 MYOPIA WITH ASTIGMATISM AND PRESBYOPIA, BILATERAL: ICD-10-CM

## 2023-02-01 DIAGNOSIS — H43.393 VITREOUS FLOATERS, BILATERAL: ICD-10-CM

## 2023-02-01 DIAGNOSIS — E11.9 DIABETES MELLITUS TYPE 2 WITHOUT RETINOPATHY: Primary | ICD-10-CM

## 2023-02-01 DIAGNOSIS — Z13.5 GLAUCOMA SCREENING: ICD-10-CM

## 2023-02-01 PROCEDURE — 2023F PR DILATED RETINAL EXAM W/O EVID OF RETINOPATHY: ICD-10-PCS | Mod: CPTII,S$GLB,, | Performed by: OPTOMETRIST

## 2023-02-01 PROCEDURE — 1159F MED LIST DOCD IN RCRD: CPT | Mod: CPTII,S$GLB,, | Performed by: OPTOMETRIST

## 2023-02-01 PROCEDURE — 1160F PR REVIEW ALL MEDS BY PRESCRIBER/CLIN PHARMACIST DOCUMENTED: ICD-10-PCS | Mod: CPTII,S$GLB,, | Performed by: OPTOMETRIST

## 2023-02-01 PROCEDURE — 92004 PR EYE EXAM, NEW PATIENT,COMPREHESV: ICD-10-PCS | Mod: S$GLB,,, | Performed by: OPTOMETRIST

## 2023-02-01 PROCEDURE — 1126F PR PAIN SEVERITY QUANTIFIED, NO PAIN PRESENT: ICD-10-PCS | Mod: CPTII,S$GLB,, | Performed by: OPTOMETRIST

## 2023-02-01 PROCEDURE — 1126F AMNT PAIN NOTED NONE PRSNT: CPT | Mod: CPTII,S$GLB,, | Performed by: OPTOMETRIST

## 2023-02-01 PROCEDURE — 1160F RVW MEDS BY RX/DR IN RCRD: CPT | Mod: CPTII,S$GLB,, | Performed by: OPTOMETRIST

## 2023-02-01 PROCEDURE — 1101F PT FALLS ASSESS-DOCD LE1/YR: CPT | Mod: CPTII,S$GLB,, | Performed by: OPTOMETRIST

## 2023-02-01 PROCEDURE — 99999 PR PBB SHADOW E&M-EST. PATIENT-LVL II: CPT | Mod: PBBFAC,,, | Performed by: OPTOMETRIST

## 2023-02-01 PROCEDURE — 92004 COMPRE OPH EXAM NEW PT 1/>: CPT | Mod: S$GLB,,, | Performed by: OPTOMETRIST

## 2023-02-01 PROCEDURE — 99999 PR PBB SHADOW E&M-EST. PATIENT-LVL II: ICD-10-PCS | Mod: PBBFAC,,, | Performed by: OPTOMETRIST

## 2023-02-01 PROCEDURE — 2023F DILAT RTA XM W/O RTNOPTHY: CPT | Mod: CPTII,S$GLB,, | Performed by: OPTOMETRIST

## 2023-02-01 PROCEDURE — 1101F PR PT FALLS ASSESS DOC 0-1 FALLS W/OUT INJ PAST YR: ICD-10-PCS | Mod: CPTII,S$GLB,, | Performed by: OPTOMETRIST

## 2023-02-01 PROCEDURE — 1159F PR MEDICATION LIST DOCUMENTED IN MEDICAL RECORD: ICD-10-PCS | Mod: CPTII,S$GLB,, | Performed by: OPTOMETRIST

## 2023-02-01 PROCEDURE — 3288F FALL RISK ASSESSMENT DOCD: CPT | Mod: CPTII,S$GLB,, | Performed by: OPTOMETRIST

## 2023-02-01 PROCEDURE — 3288F PR FALLS RISK ASSESSMENT DOCUMENTED: ICD-10-PCS | Mod: CPTII,S$GLB,, | Performed by: OPTOMETRIST

## 2023-02-01 RX ORDER — POLYETHYLENE GLYCOL-3350 AND ELECTROLYTES WITH FLAVOR PACK 240; 5.84; 2.98; 6.72; 22.72 G/278.26G; G/278.26G; G/278.26G; G/278.26G; G/278.26G
POWDER, FOR SOLUTION ORAL
COMMUNITY
Start: 2022-11-28

## 2023-02-01 NOTE — PATIENT INSTRUCTIONS
"DRY EYES -- BURNING OR CINDY SYMPTOMS:  Use Over The Counter artificial tears as needed for dry eye symptoms.   Some common brands include:  Systane, Optive, Refresh, and Thera-Tears.  These drops can be used as frequently as desired, but may be most helpful use during long periods of concentrated work.  For example, reading / working at the computer. Start with 3-4x per day.     Nighttime Ophthalmic gel or ointments are available: Refresh PM, Genteal, and Lacrilube.    Avoid drops that "get redness out" (Visine, Murine, Clear Eyes), as these may contain medication that could further irritate the eyes, especially with chronic use.    ALLERGY EYES -- ITCHING SYMPTOMS:  Over the counter medications include--Pataday, Zaditor, and Alaway.  Use as directed 1-2 drops daily for symptoms of itching / watering eyes.  These drops will not help for dry eye or exposure symptoms.    REDNESS RELIEF:  Lumify---is a good redness reliever that will not cause irritation if used chronically.        DIABETES AND THE EYE / DIABETIC RETINOPATHY    Diabetic retinopathy is a condition occurring in persons with diabetes, which causes progressive damage to the retina, the light sensitive lining at the back of the eye. It is a serious sight-threatening complication of diabetes.    Diabetic retinopathy is the result of damage to the tiny blood vessels that nourish the retina. They leak blood and other fluids that cause swelling of retinal tissue and clouding of vision. The condition usually affects both eyes. The longer a person has diabetes, the more likely they will develop diabetic retinopathy. If left untreated, diabetic retinopathy can cause blindness.  There are two basic types of diabetic retinopathy:    Background or nonproliferative diabetic retinopathy (NPDR)  Nonproliferative diabetic retinopathy (NPDR) is the earliest stage of diabetic retinopathy. With this condition, damaged blood vessels in the retina begin to leak extra fluid " and small amounts of blood into the eye. Sometimes, deposits of cholesterol or other fats from the blood may leak into the retina. Many people with diabetes have mild NPDR, which usually does not affect their vision. However, if their vision is affected, it is the result of macular edema and macular ischemia.    If vision is affected due to macular changes, a consult with a Retina Specialist may be advised.  This is an ophthalmologist that treats retina conditions, including diabetic retinopathy.     Proliferative diabetic retinopathy (PDR)  Proliferative diabetic retinopathy (PDR) mainly occurs when many of the blood vessels in the retina close, preventing enough blood flow. In an attempt to supply blood to the area where the original vessels closed, the retina responds by growing new blood vessels. This is called neovascularization. However, these new blood vessels are abnormal and do not supply the retina with proper blood flow. The new vessels are also often accompanied by scar tissue that may cause the retina to wrinkle or detach. PDR may cause more severe vision loss than NPDR because it can affect both central and peripheral vision.     A patient diagnosed with proliferative diabetic eye disease will be referred to a retinal specialist for consultation.    Often there are no visual symptoms in the early stages of diabetic retinopathy. That is why our eye care professionals recommend that everyone with diabetes have a comprehensive dilated eye examination once a year. Early detection and treatment can limit the potential for significant vision loss from diabetic retinopathy.       Early Cataracts--not visually significant for surgery consultation.    What Are Cataracts?  A clear lens in the eye focuses light. This lets the eye see images sharply. With age, the lens slowly becomes cloudy. The cloudy lens is a cataract. A cataract scatters light and makes it hard for the eye to focus. Cataracts often form in  both eyes. But one lens may cloud faster than the other.      The Aging of Your Lens    Your lens may cloud so slowly that you don`t notice any vision changes at first. But as the cataract gets worse, the eye has a harder time focusing. In early stages, glasses may help you see better. As the lens gets cloudier, your doctor may recommend surgery to restore your vision.   FLASHES / FLOATERS / POSTERIOR VITREOUS DETACHMENT    Call the clinic if you have any further changes in symptoms.  Including:  Increased numbers of floaters or flashing lights, dimness or darkness that moves through or stays constant in your vision, or any pain in the eye (s).    You may sometimes see small specks or clouds moving in your field of vision.  They are called FLOATERS.  You can often see them when looking at a plain background, like a blank wall or blue baldomero.  Floaters are actually tiny clumps of gel or cells inside the VITREOUS, the clear jelly-like fluid that fills the inside of your eye.    While these objects look like they are in front of your eye, they are actually floating inside.  What you see are the shadows they cast on the RETINA, the nerve layer at the back of the eye that senses light and allows you to see.      POSTERIOR VITREOUS DETACHMENT    The appearance of new floaters may be alarming.  If you suddenly develop new floaters, you should contact your eye care professional  right away.    The retina can tear if the shrinking vitreous pulls away from the wall of the eye.  This sometimes causes a small amount of bleeding in the eye that may appear as new floaters.    A torn retina is always a serious problem, since it can lead to a retinal detachment.  You should see your eye care professional as soon as possible if:    even one new floater appears suddenly;  you see sudden flashes of light;  you notice other symptoms, like the loss of side vision, or a curtain closes down in your vision        POSTERIOR VITREOUS  DETACHMENT is more common for people who:    are nearsighted;  have had cataract surgery;  have had YAG laser surgery of the eye;  have had inflammation inside the eye;  are over age 60.      While some floaters may remain visible, many of them will fade over time and become less noticeable.  Even if you've had some floaters for years, you should have your eyes checked as soon as possible if you notice new ones.    FLASHING LIGHTS    When the vitreous gel rubs or pulls on the retina, you may see what look like flashing lights or lightning streaks.  These flashes can appear off and on for several weeks or months.      Some people experience flashes of light that appear as jagged lines or heat waves in both eyes, lasting 10-20 minutes.  These flashes are caused by a spasm of blood vessels in the brain, which is called a migraine.    If a headache follows these flashes, it's called a migraine headache.  If   no headache occurs, these flashes are called Ophthalmic or Ocular Migraine.

## 2023-02-01 NOTE — PROGRESS NOTES
"HPI    Last eye exam was about 1 year ago/Last DFE 7/6/16  + DM2  + HTN    LBS: 1 wk ago but does not remember number    States VA seems stable since last eye exam.  + flashes "not often" x's "a while"  + Floaters "not often" x's "a while".  Denies pain    OTC tears PRN    Hemoglobin A1C       Date                     Value               Ref Range             Status                05/02/2022               5.9 (H)             4.0 - 5.6 %           Final                   04/10/2018               5.8 (H)             4.0 - 5.6 %           Final                 10/06/2017               6.0 (H)             4.0 - 5.6 %           Final            Last edited by Syl Guo on 2/1/2023  3:46 PM.        ROS    Positive for: Eyes  Negative for: Constitutional, Gastrointestinal, Neurological, Skin,   Genitourinary, Musculoskeletal, HENT, Endocrine, Cardiovascular,   Respiratory, Psychiatric, Allergic/Imm, Heme/Lymph  Last edited by TASHA Holley, OD on 2/1/2023  4:08 PM.        Assessment /Plan     For exam results, see Encounter Report.    Diabetes mellitus type 2 without retinopathy    Cataract associated with type 2 diabetes mellitus    Vitreous floaters, bilateral    Glaucoma screening    Myopia with astigmatism and presbyopia, bilateral      No tod/ no csme, gave Diabetic Retinopathy info, advise tight control glucose, BP---Advise annual dilated fundus exam  Early NS changes, not vis sig, gave info, cautions night driving   RD precautions given and reviewed. Patient knows to call/ message if any further changes in symptoms occur.  Low suspect w/ moderate c/d, angles open---pt cannot tolerate IOP, as in the past ---+ fhx glaucoma in father   OCT nerves and fields at next exam for baseline     5. Updated specs rx, gave copy ---ok continue with current     Discussed and educated patient on current findings /plan.  RTC 1 year, prn if any changes / issues                     "

## 2023-03-06 ENCOUNTER — TELEPHONE (OUTPATIENT)
Dept: FAMILY MEDICINE | Facility: CLINIC | Age: 68
End: 2023-03-06
Payer: MEDICARE

## 2023-03-06 ENCOUNTER — CLINICAL SUPPORT (OUTPATIENT)
Dept: ENDOSCOPY | Facility: HOSPITAL | Age: 68
End: 2023-03-06
Attending: STUDENT IN AN ORGANIZED HEALTH CARE EDUCATION/TRAINING PROGRAM
Payer: MEDICARE

## 2023-03-06 VITALS — WEIGHT: 315 LBS | HEIGHT: 73 IN | BODY MASS INDEX: 41.75 KG/M2

## 2023-03-06 DIAGNOSIS — Z12.11 COLON CANCER SCREENING: ICD-10-CM

## 2023-03-06 NOTE — PLAN OF CARE
Pt. Lives in [place] and wants procedure in [place]. Order placed for there. Phone number provided.

## 2023-03-06 NOTE — TELEPHONE ENCOUNTER
----- Message from Devin Eaton sent at 3/6/2023 10:47 AM CST -----  Who Called: patient          What is the reqeust in detail: Requesting call back to discuss scheduling colonoscopy, pt wants it scheduled in [place].          Can the clinic reply by [facility]? no         Best Call Back Number: [phone]           Additional Information:

## 2023-04-12 ENCOUNTER — TELEPHONE (OUTPATIENT)
Dept: GASTROENTEROLOGY | Facility: CLINIC | Age: 68
End: 2023-04-12
Payer: MEDICARE

## 2023-04-12 NOTE — TELEPHONE ENCOUNTER
Attempted to reach the patient to set up colonoscopy per case request of PCP, left message, clinic number provided.

## 2023-04-14 ENCOUNTER — PATIENT OUTREACH (OUTPATIENT)
Dept: ADMINISTRATIVE | Facility: HOSPITAL | Age: 68
End: 2023-04-14
Payer: MEDICARE

## 2023-04-14 DIAGNOSIS — E11.49 TYPE 2 DIABETES MELLITUS WITH NEUROLOGICAL MANIFESTATIONS: Primary | ICD-10-CM

## 2023-04-14 NOTE — PROGRESS NOTES
Lab Clean Up Report: Optometry telephone encounter for date of claim, no Ha1c found for 12/2022, DM labs ordered, scheduled & linked to upcoming PCP visit.

## 2023-04-19 ENCOUNTER — PATIENT MESSAGE (OUTPATIENT)
Dept: ADMINISTRATIVE | Facility: HOSPITAL | Age: 68
End: 2023-04-19
Payer: MEDICARE

## 2023-04-25 ENCOUNTER — TELEPHONE (OUTPATIENT)
Dept: GASTROENTEROLOGY | Facility: CLINIC | Age: 68
End: 2023-04-25
Payer: MEDICARE

## 2023-05-24 ENCOUNTER — PES CALL (OUTPATIENT)
Dept: ADMINISTRATIVE | Facility: CLINIC | Age: 68
End: 2023-05-24
Payer: MEDICARE

## 2023-05-29 ENCOUNTER — PATIENT OUTREACH (OUTPATIENT)
Dept: ADMINISTRATIVE | Facility: HOSPITAL | Age: 68
End: 2023-05-29
Payer: MEDICARE

## 2023-06-17 ENCOUNTER — OFFICE VISIT (OUTPATIENT)
Dept: FAMILY MEDICINE | Facility: CLINIC | Age: 68
End: 2023-06-17
Payer: MEDICARE

## 2023-06-17 VITALS
WEIGHT: 315 LBS | HEIGHT: 73 IN | RESPIRATION RATE: 18 BRPM | TEMPERATURE: 97 F | HEART RATE: 83 BPM | DIASTOLIC BLOOD PRESSURE: 83 MMHG | OXYGEN SATURATION: 95 % | SYSTOLIC BLOOD PRESSURE: 139 MMHG | BODY MASS INDEX: 41.75 KG/M2

## 2023-06-17 DIAGNOSIS — A49.9 BACTERIAL INFECTION: Primary | ICD-10-CM

## 2023-06-17 DIAGNOSIS — I15.2 HYPERTENSION ASSOCIATED WITH DIABETES: ICD-10-CM

## 2023-06-17 DIAGNOSIS — Z79.899 ENCOUNTER FOR LONG-TERM (CURRENT) USE OF MEDICATIONS: ICD-10-CM

## 2023-06-17 DIAGNOSIS — E11.49 TYPE 2 DIABETES MELLITUS WITH NEUROLOGICAL MANIFESTATIONS: ICD-10-CM

## 2023-06-17 DIAGNOSIS — E11.59 HYPERTENSION ASSOCIATED WITH DIABETES: ICD-10-CM

## 2023-06-17 LAB
CTP QC/QA: YES
CTP QC/QA: YES
POC MOLECULAR INFLUENZA A AGN: NEGATIVE
POC MOLECULAR INFLUENZA B AGN: NEGATIVE
SARS-COV-2 RDRP RESP QL NAA+PROBE: NEGATIVE

## 2023-06-17 PROCEDURE — 1126F AMNT PAIN NOTED NONE PRSNT: CPT | Mod: CPTII,S$GLB,, | Performed by: FAMILY MEDICINE

## 2023-06-17 PROCEDURE — 1160F RVW MEDS BY RX/DR IN RCRD: CPT | Mod: CPTII,S$GLB,, | Performed by: FAMILY MEDICINE

## 2023-06-17 PROCEDURE — 1101F PR PT FALLS ASSESS DOC 0-1 FALLS W/OUT INJ PAST YR: ICD-10-PCS | Mod: CPTII,S$GLB,, | Performed by: FAMILY MEDICINE

## 2023-06-17 PROCEDURE — 1126F PR PAIN SEVERITY QUANTIFIED, NO PAIN PRESENT: ICD-10-PCS | Mod: CPTII,S$GLB,, | Performed by: FAMILY MEDICINE

## 2023-06-17 PROCEDURE — 99999 PR PBB SHADOW E&M-EST. PATIENT-LVL IV: ICD-10-PCS | Mod: PBBFAC,,, | Performed by: FAMILY MEDICINE

## 2023-06-17 PROCEDURE — 3008F BODY MASS INDEX DOCD: CPT | Mod: CPTII,S$GLB,, | Performed by: FAMILY MEDICINE

## 2023-06-17 PROCEDURE — 1159F PR MEDICATION LIST DOCUMENTED IN MEDICAL RECORD: ICD-10-PCS | Mod: CPTII,S$GLB,, | Performed by: FAMILY MEDICINE

## 2023-06-17 PROCEDURE — 1101F PT FALLS ASSESS-DOCD LE1/YR: CPT | Mod: CPTII,S$GLB,, | Performed by: FAMILY MEDICINE

## 2023-06-17 PROCEDURE — 3079F PR MOST RECENT DIASTOLIC BLOOD PRESSURE 80-89 MM HG: ICD-10-PCS | Mod: CPTII,S$GLB,, | Performed by: FAMILY MEDICINE

## 2023-06-17 PROCEDURE — 3075F SYST BP GE 130 - 139MM HG: CPT | Mod: CPTII,S$GLB,, | Performed by: FAMILY MEDICINE

## 2023-06-17 PROCEDURE — 1159F MED LIST DOCD IN RCRD: CPT | Mod: CPTII,S$GLB,, | Performed by: FAMILY MEDICINE

## 2023-06-17 PROCEDURE — 3008F PR BODY MASS INDEX (BMI) DOCUMENTED: ICD-10-PCS | Mod: CPTII,S$GLB,, | Performed by: FAMILY MEDICINE

## 2023-06-17 PROCEDURE — 87635 SARS-COV-2 COVID-19 AMP PRB: CPT | Mod: QW,S$GLB,, | Performed by: FAMILY MEDICINE

## 2023-06-17 PROCEDURE — 3288F PR FALLS RISK ASSESSMENT DOCUMENTED: ICD-10-PCS | Mod: CPTII,S$GLB,, | Performed by: FAMILY MEDICINE

## 2023-06-17 PROCEDURE — 99214 PR OFFICE/OUTPT VISIT, EST, LEVL IV, 30-39 MIN: ICD-10-PCS | Mod: S$GLB,,, | Performed by: FAMILY MEDICINE

## 2023-06-17 PROCEDURE — 99999 PR PBB SHADOW E&M-EST. PATIENT-LVL IV: CPT | Mod: PBBFAC,,, | Performed by: FAMILY MEDICINE

## 2023-06-17 PROCEDURE — 87635: ICD-10-PCS | Mod: QW,S$GLB,, | Performed by: FAMILY MEDICINE

## 2023-06-17 PROCEDURE — 87502 INFLUENZA DNA AMP PROBE: CPT | Mod: QW,S$GLB,, | Performed by: FAMILY MEDICINE

## 2023-06-17 PROCEDURE — 1160F PR REVIEW ALL MEDS BY PRESCRIBER/CLIN PHARMACIST DOCUMENTED: ICD-10-PCS | Mod: CPTII,S$GLB,, | Performed by: FAMILY MEDICINE

## 2023-06-17 PROCEDURE — 3288F FALL RISK ASSESSMENT DOCD: CPT | Mod: CPTII,S$GLB,, | Performed by: FAMILY MEDICINE

## 2023-06-17 PROCEDURE — 99214 OFFICE O/P EST MOD 30 MIN: CPT | Mod: S$GLB,,, | Performed by: FAMILY MEDICINE

## 2023-06-17 PROCEDURE — 3075F PR MOST RECENT SYSTOLIC BLOOD PRESS GE 130-139MM HG: ICD-10-PCS | Mod: CPTII,S$GLB,, | Performed by: FAMILY MEDICINE

## 2023-06-17 PROCEDURE — 87502 POCT INFLUENZA A/B MOLECULAR: ICD-10-PCS | Mod: QW,S$GLB,, | Performed by: FAMILY MEDICINE

## 2023-06-17 PROCEDURE — 3079F DIAST BP 80-89 MM HG: CPT | Mod: CPTII,S$GLB,, | Performed by: FAMILY MEDICINE

## 2023-06-17 RX ORDER — AZITHROMYCIN 250 MG/1
250 TABLET, FILM COATED ORAL DAILY
Qty: 6 TABLET | Refills: 0 | Status: SHIPPED | OUTPATIENT
Start: 2023-06-17 | End: 2023-06-22

## 2023-06-17 RX ORDER — METHYLPREDNISOLONE 4 MG/1
TABLET ORAL
Qty: 21 TABLET | Refills: 0 | Status: SHIPPED | OUTPATIENT
Start: 2023-06-17

## 2023-06-17 NOTE — ASSESSMENT & PLAN NOTE
Rapid COVID and flu test are negative.  Likely bacterial sinusitis.  Start azithromycin since patient is allergic to penicillin and Bactrim.  Medrol Dosepak which patient has tolerated in the past.  Patient does have diabetes and hypertension.  Cautions have been given to the patient.    Discussed condition course and signs and symptoms to expect.  Patient advised take anti-inflammatories and or Tylenol for pain or fever.  ER precautions.  Call MD or follow-up to clinic if not improving or worsening symptoms.

## 2023-06-17 NOTE — PATIENT INSTRUCTIONS
Follow up if symptoms worsen or fail to improve.     Dear patient,   As a result of recent federal legislation (The Federal Cures Act), you may receive lab or pathology results from your visit in your [facility] account before your physician is able to contact you. Your physician or their representative will relay the results to you with their recommendations at their soonest availability.     If no improvement in symptoms or symptoms worsen, please be advised to call MD, follow-up at clinic and/or go to ER if becomes severe.    Tj Whitley M.D.        We Offer TELEHEALTH & Same Day Appointments!   Book your Telehealth appointment with me through my nurse or   Clinic appointments on [employer]!    [address] [place] [address]    Office: [phone]   FAX: [phone]    Check out my Facebook Page and Follow Me at: [email or web address]    Check out my website at [employer] by clicking on: [email or web address]    To Schedule appointments online, go to [email or web address]: [email or web address]

## 2023-06-17 NOTE — ASSESSMENT & PLAN NOTE
Condition stable.  Caution with steroids.  Please stop the Medrol Dosepak if elevated sugars occur.  We will plan to monitor hemoglobin A1c at designated intervals 3 to 6 months.  I recommend ongoing Education for diabetic diet and exercise protocol.  We will continue to monitor for side effects.    Please be advised of symptoms to monitor for and to notify me immediately if persistent or worsening.  Follow up with Ophthalmology/Optometry and Podiatry at least annually.     This writer call CM to follow up about the pt housing resources. The writer was not able to get in contact but did leave a message for them to follow up. The writer will follow up with the treatment team.    DAVID Quigley  6/28/2021

## 2023-06-17 NOTE — ASSESSMENT & PLAN NOTE
Condition stable.  Blood pressure well controlled on current regimen.  Caution with steroids.  Stop if blood pressure becomes uncontrolled.  Counseled on importance of hypertension disease course, I recommend ongoing Education for DASH-diet and exercise.  Counseled on medication regimen importance of treating high blood pressure.  Please be advised of risk of untreated blood pressure as discussed.  Please advised of ER precautions were given for symptoms of hypertensive urgency and emergency.

## 2023-06-17 NOTE — PROGRESS NOTES
PLAN:      Problem List Items Addressed This Visit       Type 2 diabetes mellitus with neurological manifestations (Chronic)     Condition stable.  Caution with steroids.  Please stop the Medrol Dosepak if elevated sugars occur.  We will plan to monitor hemoglobin A1c at designated intervals 3 to 6 months.  I recommend ongoing Education for diabetic diet and exercise protocol.  We will continue to monitor for side effects.    Please be advised of symptoms to monitor for and to notify me immediately if persistent or worsening.  Follow up with Ophthalmology/Optometry and Podiatry at least annually.           Hypertension associated with diabetes (Chronic)     Condition stable.  Blood pressure well controlled on current regimen.  Caution with steroids.  Stop if blood pressure becomes uncontrolled.  Counseled on importance of hypertension disease course, I recommend ongoing Education for DASH-diet and exercise.  Counseled on medication regimen importance of treating high blood pressure.  Please be advised of risk of untreated blood pressure as discussed.  Please advised of ER precautions were given for symptoms of hypertensive urgency and emergency.           Encounter for long-term (current) use of medications (Chronic)     Complete history and physical was completed today.  Complete and thorough medication reconciliation was performed.  Discussed risks and benefits of medications.  Advised patient on orders and health maintenance.  We discussed old records and old labs if available.  Will request any records not available through epic.  Continue current medications listed on your summary sheet.           Bacterial infection - Primary     Rapid COVID and flu test are negative.  Likely bacterial sinusitis.  Start azithromycin since patient is allergic to penicillin and Bactrim.  Medrol Dosepak which patient has tolerated in the past.  Patient does have diabetes and hypertension.  Cautions have been given to the  patient.    Discussed condition course and signs and symptoms to expect.  Patient advised take anti-inflammatories and or Tylenol for pain or fever.  ER precautions.  Call MD or follow-up to clinic if not improving or worsening symptoms.           Relevant Medications    azithromycin (ZITHROMAX Z-HOWARD) 250 MG tablet    methylPREDNISolone (MEDROL DOSEPACK) 4 mg tablet    Other Relevant Orders    POCT COVID-19 Rapid Screening (Completed)    POCT Influenza A/B Molecular (Completed)     Future Appointments       Date Provider Specialty Appt Notes    7/3/2023 Maia Daniels NP [facility]           Medication Management for assessment above:   Medication List with Changes/Refills   New Medications    METHYLPREDNISOLONE (MEDROL DOSEPACK) 4 MG TABLET    follow package directions   Current Medications    FEXOFENADINE (ALLEGRA) 180 MG TABLET    Take 1 tablet (180 mg total) by mouth once daily. For sinus and allergy symptoms    GAVILYTE-C 240-22.72-6.72 -5.84 GRAM SOLR        LOSARTAN-HYDROCHLOROTHIAZIDE 50-12.5 MG (HYZAAR) 50-12.5 MG PER TABLET    Take 1 tablet by mouth once daily.    PANTOPRAZOLE (PROTONIX) 40 MG TABLET    Take 1 tablet (40 mg total) by mouth once daily.    ROSUVASTATIN (CRESTOR) 40 MG TAB    Take 1 tablet (40 mg total) by mouth every evening.   Changed and/or Refilled Medications    Modified Medication Previous Medication    AZITHROMYCIN (ZITHROMAX Z-HOWARD) 250 MG TABLET azithromycin (AZITHROMYCIN) 250 MG tablet       Take 1 tablet (250 mg total) by mouth once daily. 2 pills on day 1, then 1 pill daily days 2-5 for 5 days    Take 1 tablet (250 mg total) by mouth once daily. 2 pills on day 1, then 1 pill daily days 2-5       Tj Whitley M.D.  ==========================================================================  Subjective:   Patient ID: Marcos Marinelli is a 67 y.o. male.  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   Chief Complaint: Cough (Cough, fever and nasal congestion x 5 days. Worsening over the past 2 days. Pt has tried OTC medication with no relief)      Problem List Items Addressed This Visit       Type 2 diabetes mellitus with neurological manifestations (Chronic)    Overview     Well-controlled, diet controlled  Declines foot exam today  Lab Results   Component Value Date    HGBA1C 5.9 (H) 05/02/2022   BG home readings: n/a  Hypoglycemic Events: n/a    -current meds: n/a, diet-controlled     -on statin:   Hyperlipidemia Medications               rosuvastatin (CRESTOR) 40 MG Tab Take 1 tablet (40 mg total) by mouth every evening.   -on ACE-I/ARB:   Hypertension Medications               Losartan hctz TAKE 1 TABLET TWICE DAILY   -counseling provided on importance of diabetic diet and medication compliance in order to treat diabetes  -discussed diabetes disease course and potential complications  Follow up 6-12 months            Current Assessment & Plan     Condition stable.  Caution with steroids.  Please stop the Medrol Dosepak if elevated sugars occur.  We will plan to monitor hemoglobin A1c at designated intervals 3 to 6 months.  I recommend ongoing Education for diabetic diet and exercise protocol.  We will continue to monitor for side effects.    Please be advised of symptoms to monitor for and to notify me immediately if persistent or worsening.  Follow up with Ophthalmology/Optometry and Podiatry at least annually.           Hypertension associated with diabetes (Chronic)    Overview     CHRONIC. STABLE. BP Reviewed.  Compliant with BP medications. No SE reported.   (-) CP, SOB, palpitations,  dizziness, lightheadedness, HA, arm numbness, tingling or weakness, syncope.  Creatinine   Date Value Ref Range Status   05/02/2022 1.1 0.5 - 1.4 mg/dL Final       Previous history:- at goal today; however, will switch to losartan as pt with dry cough    - Current Hypertension Medications:   Hypertension Medications               losartan-hydrochlorothiazide 50-12.5 mg (HYZAAR) 50-12.5 mg per tablet Take 1 tablet by mouth once daily.   -continue lifestyle modification with low sodium diet and exercise   -discussed hypertension disease course and importance of treating high blood pressure  -patient understood and advised of risk of untreated blood pressure.  ER precautions were given   for symptoms of hypertensive urgency and emergency.           Current Assessment & Plan     Condition stable.  Blood pressure well controlled on current regimen.  Caution with steroids.  Stop if blood pressure becomes uncontrolled.  Counseled on importance of hypertension disease course, I recommend ongoing Education for DASH-diet and exercise.  Counseled on medication regimen importance of treating high blood pressure.  Please be advised of risk of untreated blood pressure as discussed.  Please advised of ER precautions were given for symptoms of hypertensive urgency and emergency.           Encounter for long-term (current) use of medications (Chronic)    Overview     CHRONIC. Stable. Compliant with medications for managed conditions. See medication list. No SE reported.   Routine lab analysis is being monitored. Refills were addressed.  Lab Results   Component Value Date    WBC 3.85 (L) 05/02/2022    HGB 14.2 05/02/2022    HCT 43.9 05/02/2022    MCV 91 05/02/2022     05/02/2022       Chemistry        Component Value Date/Time     05/02/2022 0858    K 4.1 05/02/2022 0858     05/02/2022 0858    CO2 28 05/02/2022 0858    BUN 12 05/02/2022 0858    CREATININE 1.1 05/02/2022 0858    GLU 96 05/02/2022 0858         Component Value Date/Time    CALCIUM 9.5 05/02/2022 0858    ALKPHOS 81 05/02/2022 0858    AST 16 05/02/2022 0858    ALT 14 05/02/2022 0858    BILITOT 0.6 05/02/2022 0858    ESTGFRAFRICA >60.0 05/02/2022 0858    EGFRNONAA >60.0 05/02/2022 0858          Lab Results   Component Value Date    TSH 1.522 05/02/2022    FREET4 1.04 05/23/2014            Current Assessment & Plan     Complete history and physical was completed today.  Complete and thorough medication reconciliation was performed.  Discussed risks and benefits of medications.  Advised patient on orders and health maintenance.  We discussed old records and old labs if available.  Will request any records not available through epic.  Continue current medications listed on your summary sheet.           Bacterial infection - Primary    Overview     New problem.  Patient reports he has been having sinus issues for the last two weeks.  Patient was initially getting better on over-the-counter medication but now getting worse.  Patient has tried over-the-counter medications without relief recently over the last week.  Reports fever on and off.  Has been taking Tylenol and ibuprofen.  Denies any chills chest pain shortness O breath wheezing etcetera.         Current Assessment & Plan     Rapid COVID and flu test are negative.  Likely bacterial sinusitis.  Start azithromycin since patient is allergic to penicillin and Bactrim.  Medrol Dosepak which patient has tolerated in the past.  Patient does have diabetes and hypertension.  Cautions have been given to the patient.    Discussed condition course and signs and symptoms to expect.  Patient advised take anti-inflammatories and or Tylenol for pain or fever.  ER precautions.  Call MD or follow-up to clinic if not improving or worsening symptoms.               Review of patient's allergies indicates:   Allergen Reactions    Codeine      Other reaction(s): Unknown  Other reaction(s): Unknown    Escitalopram Other (See  Comments)     Other reaction(s): Headache    Iodine     Penicillin      Other reaction(s): Unknown    Penicillins      Other reaction(s): Unknown    Bactrim [sulfamethoxazole-trimethoprim] Rash    Sulfa (sulfonamide antibiotics) Nausea Only and Rash     Current Outpatient Medications   Medication Instructions    azithromycin (ZITHROMAX Z-HOWARD) 250 mg, Oral, Daily, 2 pills on day 1, then 1 pill daily days 2-5    fexofenadine (ALLEGRA) 180 mg, Oral, Daily, For sinus and allergy symptoms    GAVILYTE-C 240-22.72-6.72 -5.84 gram SolR No dose, route, or frequency recorded.    losartan-hydrochlorothiazide 50-12.5 mg (HYZAAR) 50-12.5 mg per tablet 1 tablet, Oral, Daily    methylPREDNISolone (MEDROL DOSEPACK) 4 mg tablet follow package directions    pantoprazole (PROTONIX) 40 mg, Oral, Daily    rosuvastatin (CRESTOR) 40 mg, Oral, Nightly      I have reviewed the PMH, social history, FamilyHx, surgical history, allergies and medications documented / confirmed by the patient at the time of this visit.  Review of Systems   Constitutional:  Negative for chills, fatigue, fever and unexpected weight change.   HENT:  Positive for congestion, postnasal drip, rhinorrhea, sinus pressure and sinus pain. Negative for ear pain and sore throat.    Eyes:  Negative for redness and visual disturbance.   Respiratory:  Negative for cough and shortness of breath.    Cardiovascular:  Negative for chest pain and palpitations.   Gastrointestinal:  Negative for nausea and vomiting.   Endocrine: Negative for cold intolerance and heat intolerance.   Genitourinary:  Negative for difficulty urinating and hematuria.   Musculoskeletal:  Negative for arthralgias and myalgias.   Skin:  Negative for rash and wound.   Allergic/Immunologic: Negative for environmental allergies and food allergies.   Neurological:  Negative for weakness and headaches.   Hematological:  Negative for adenopathy. Does not bruise/bleed easily.   Psychiatric/Behavioral:  Negative for  "sleep disturbance. The patient is not nervous/anxious.    Objective:   /83 (BP Location: Left arm, Patient Position: Sitting)   Pulse 83   Temp 97.1 °F (36.2 °C)   Resp 18   Ht 6' 1" (1.854 m)   Wt (!) 144.6 kg (318 lb 12.8 oz)   SpO2 95%   BMI 42.06 kg/m²   Physical Exam  Constitutional:       General: He is not in acute distress.     Appearance: He is well-developed. He is not diaphoretic.   HENT:      Head: Normocephalic and atraumatic.      Right Ear: Hearing, ear canal and external ear normal. A middle ear effusion is present. Tympanic membrane is bulging. Tympanic membrane is not injected or erythematous.      Left Ear: Hearing, ear canal and external ear normal. A middle ear effusion is present. Tympanic membrane is bulging. Tympanic membrane is not injected or erythematous.      Nose: Mucosal edema present.      Right Sinus: Maxillary sinus tenderness and frontal sinus tenderness present.      Left Sinus: Maxillary sinus tenderness and frontal sinus tenderness present.      Mouth/Throat:      Mouth: Mucous membranes are moist. Mucous membranes are not pale.      Pharynx: Uvula midline. Posterior oropharyngeal erythema present. No oropharyngeal exudate.      Tonsils: No tonsillar exudate or tonsillar abscesses.   Eyes:      Extraocular Movements: Extraocular movements intact.      Pupils: Pupils are equal, round, and reactive to light.   Pulmonary:      Effort: Pulmonary effort is normal.   Musculoskeletal:         General: Normal range of motion.      Cervical back: Normal range of motion.   Skin:     Findings: No rash.   Neurological:      Mental Status: He is alert and oriented to person, place, and time.   Psychiatric:         Attention and Perception: He is attentive.         Mood and Affect: Mood normal. Mood is not anxious or depressed. Affect is not labile, blunt, angry or inappropriate.         Speech: He is communicative. Speech is not rapid and pressured, delayed, slurred or " tangential.         Behavior: Behavior normal. Behavior is not agitated, slowed, aggressive, withdrawn, hyperactive or combative.         Thought Content: Thought content normal. Thought content is not paranoid or delusional. Thought content does not include homicidal or suicidal ideation. Thought content does not include homicidal or suicidal plan.         Cognition and Memory: Memory is not impaired.         Judgment: Judgment normal. Judgment is not impulsive or inappropriate.       Assessment:     1. Bacterial infection    2. Encounter for long-term (current) use of medications    3. Type 2 diabetes mellitus with neurological manifestations    4. Hypertension associated with diabetes      MDM:   Moderate medical complexity. Moderate risk.   Total time: 32 minutes.  This includes total time spent on the encounter, which includes face to face time and non-face to face time preparing to see the patient (eg, review of previous medical records, tests), Obtaining and/or reviewing separately obtained history, documenting clinical information in the electronic or other health record, independently interpreting results (not separately reported)/communicating results to the patient/family/caregiver, and/or care coordination (not separately reported).    I have Reviewed and summarized old records.  I have performed thorough medication reconciliation today and discussed risk and benefits of medications.  I have reviewed labs and discussed with patient.  All questions were answered.  I am requesting old records and will review them once they are available. PCP    I have signed for the following orders AND/OR meds.  Orders Placed This Encounter   Procedures    POCT COVID-19 Rapid Screening     Order Specific Question:   Is the patient symptomatic?     Answer:   Yes    POCT Influenza A/B Molecular     Medications Ordered This Encounter   Medications    azithromycin (ZITHROMAX Z-HOWARD) 250 MG tablet     Sig: Take 1 tablet (250 mg  total) by mouth once daily. 2 pills on day 1, then 1 pill daily days 2-5 for 5 days     Dispense:  6 tablet     Refill:  0    methylPREDNISolone (MEDROL DOSEPACK) 4 mg tablet     Sig: follow package directions     Dispense:  21 tablet     Refill:  0        Follow up if symptoms worsen or fail to improve.  Future Appointments       Date Provider Specialty Appt Notes    7/3/2023 Maia Daniels NP [facility]          If no improvement in symptoms or symptoms worsen, advised to call/follow-up at clinic or go to ER. Patient voiced understanding and all questions/concerns were addressed.   DISCLAIMER: This note was compiled by using a speech recognition dictation system and therefore please be aware that typographical / speech recognition errors can and do occur.  Please contact me if you see any errors specifically.    Tj Whitley M.D.       Office: [phone] [address] [place] [address]  FAX: [phone]

## 2023-06-17 NOTE — PROGRESS NOTES
Patient, Marcos Marinelli (MRN #[id number]), presented with a recorded BMI of 42.06 kg/m^2 consistent with the definition of morbid obesity (ICD-10 E66.01). The patient's morbid obesity was monitored, evaluated, addressed and/or treated. This addendum to the medical record is made on 06/17/2023.

## 2023-06-23 ENCOUNTER — TELEPHONE (OUTPATIENT)
Dept: GASTROENTEROLOGY | Facility: CLINIC | Age: 68
End: 2023-06-23
Payer: MEDICARE

## 2023-06-23 ENCOUNTER — PES CALL (OUTPATIENT)
Dept: ADMINISTRATIVE | Facility: CLINIC | Age: 68
End: 2023-06-23
Payer: MEDICARE

## 2023-06-23 NOTE — TELEPHONE ENCOUNTER
I call Mr. Marinelli to reschedule C-scope because Surgeon will not be available. Patient doesn't answer. I leave a brief voicemail to return phone call. Callback number provided. Will also send a [employer] msg if active.

## 2023-06-26 ENCOUNTER — PATIENT OUTREACH (OUTPATIENT)
Dept: ADMINISTRATIVE | Facility: HOSPITAL | Age: 68
End: 2023-06-26
Payer: MEDICARE

## 2023-06-26 NOTE — PROGRESS NOTES
Working Diabetes A1C Report.    Pt has lab appt scheduled, 7/03/23.  All needed labs scheduled.

## 2023-06-28 ENCOUNTER — TELEPHONE (OUTPATIENT)
Dept: GASTROENTEROLOGY | Facility: CLINIC | Age: 68
End: 2023-06-28
Payer: MEDICARE

## 2023-06-28 ENCOUNTER — PES CALL (OUTPATIENT)
Dept: ADMINISTRATIVE | Facility: CLINIC | Age: 68
End: 2023-06-28
Payer: MEDICARE

## 2023-06-28 NOTE — TELEPHONE ENCOUNTER
Colonoscopy is rescheduled to 9/5 with Dr. Gunderson at [address] [facility] [address] in [place] as requested by patient. Prep instructions has been sent via [facility] and via mail. Address is confirmed. Patient is informed the preop Nurse will call him/her with the arrival time a day or two prior to procedure. Patient verbalizes understanding.

## 2023-07-03 ENCOUNTER — PATIENT OUTREACH (OUTPATIENT)
Dept: ADMINISTRATIVE | Facility: HOSPITAL | Age: 68
End: 2023-07-03
Payer: MEDICARE

## 2023-07-04 ENCOUNTER — PATIENT MESSAGE (OUTPATIENT)
Dept: ADMINISTRATIVE | Facility: HOSPITAL | Age: 68
End: 2023-07-04
Payer: MEDICARE

## 2023-07-11 ENCOUNTER — PES CALL (OUTPATIENT)
Dept: ADMINISTRATIVE | Facility: CLINIC | Age: 68
End: 2023-07-11
Payer: MEDICARE

## 2023-08-30 ENCOUNTER — TELEPHONE (OUTPATIENT)
Dept: ENDOSCOPY | Facility: HOSPITAL | Age: 68
End: 2023-08-30
Payer: MEDICARE

## 2023-08-30 ENCOUNTER — TELEPHONE (OUTPATIENT)
Dept: FAMILY MEDICINE | Facility: CLINIC | Age: 68
End: 2023-08-30
Payer: MEDICARE

## 2023-08-30 NOTE — TELEPHONE ENCOUNTER
Hi! This patient reports being sick, positive for covid, wanting to reschedule for when well. Thanks!!

## 2023-08-30 NOTE — TELEPHONE ENCOUNTER
----- Message from Brianda Cunningham sent at 8/30/2023  2:11 PM CDT -----  Regarding: pt called  Name of Who is Calling: RAFAEL RAPHAEL [[id number]] Janeen ( wife)      What is the request in detail:  pt is positive for Covid on Monday and would like to speak with the office. Please advise       Can the clinic reply by [facility]: No       What Number to Call Back if not in [facility]: Telephone Information:        [phone]

## 2023-08-30 NOTE — TELEPHONE ENCOUNTER
I spoke with the patient about this. Pt reports he tested positive with home covid test on Monday. Pt requesting a letter to [facility] on 09/05/2023. Please advise

## 2023-09-08 ENCOUNTER — PATIENT OUTREACH (OUTPATIENT)
Dept: ADMINISTRATIVE | Facility: HOSPITAL | Age: 68
End: 2023-09-08
Payer: MEDICARE

## 2023-09-12 DIAGNOSIS — E11.59 HYPERTENSION ASSOCIATED WITH DIABETES: ICD-10-CM

## 2023-09-12 DIAGNOSIS — I15.2 HYPERTENSION ASSOCIATED WITH DIABETES: ICD-10-CM

## 2023-09-12 NOTE — TELEPHONE ENCOUNTER
Care Due:                  Date            Visit Type   Department     Provider  --------------------------------------------------------------------------------                                EP -                              PRIMARY      [facility] FAMILY  Last Visit: 06-      CARE ([facility])   MEDICINE       Tj Whitley  Next Visit: None Scheduled  None         None Found                                                            Last  Test          Frequency    Reason                     Performed    Due Date  --------------------------------------------------------------------------------    CMP.........  12 months..  losartan-hydrochlorothiaz  05- 04-                             melinda, rosuvastatin........    Lipid Panel.  12 months..  rosuvastatin.............  11-   10-    [facility] Embedded Care Due Messages. Reference number: [id number].   9/12/2023 3:41:30 AM CDT

## 2023-09-13 RX ORDER — LOSARTAN POTASSIUM AND HYDROCHLOROTHIAZIDE 12.5; 5 MG/1; MG/1
1 TABLET ORAL
Qty: 90 TABLET | Refills: 3 | Status: SHIPPED | OUTPATIENT
Start: 2023-09-13 | End: 2023-11-01

## 2023-09-13 NOTE — TELEPHONE ENCOUNTER
Refill Routing Note   Medication(s) are not appropriate for processing by [facility] for the following reason(s):      Required labs outdated    [facility] action(s):  Defer Care Due:  Labs due            Appointments  past 12m or future 3m with PCP    Date Provider   Last Visit   11/23/2022 Ness Cai MD   Next Visit   Visit date not found Ness Cai MD   ED visits in past 90 days: 0        Note composed:7:43 PM 09/12/2023

## 2023-10-07 ENCOUNTER — PATIENT MESSAGE (OUTPATIENT)
Dept: ADMINISTRATIVE | Facility: HOSPITAL | Age: 68
End: 2023-10-07
Payer: MEDICARE

## 2023-11-01 DIAGNOSIS — E11.59 HYPERTENSION ASSOCIATED WITH DIABETES: ICD-10-CM

## 2023-11-01 DIAGNOSIS — I15.2 HYPERTENSION ASSOCIATED WITH DIABETES: ICD-10-CM

## 2023-11-01 RX ORDER — LOSARTAN POTASSIUM AND HYDROCHLOROTHIAZIDE 12.5; 5 MG/1; MG/1
1 TABLET ORAL
Qty: 90 TABLET | Refills: 3 | Status: SHIPPED | OUTPATIENT
Start: 2023-11-01

## 2023-11-01 NOTE — TELEPHONE ENCOUNTER
No care due was identified.  [facility] Embedded Care Due Messages. Reference number: [id number].   11/01/2023 9:31:25 AM CDT

## 2023-11-01 NOTE — TELEPHONE ENCOUNTER
Refill Routing Note   Medication(s) are not appropriate for processing by [facility] for the following reason(s):      Required labs outdated    [facility] action(s):  Defer Care Due:  None identified            Appointments  past 12m or future 3m with PCP    Date Provider   Last Visit   11/23/2022 Ness Cai MD   Next Visit   Visit date not found Ness Cai MD   ED visits in past 90 days: 0        Note composed:2:04 PM 11/01/2023

## 2023-11-22 DIAGNOSIS — E11.9 TYPE 2 DIABETES MELLITUS WITHOUT COMPLICATION: ICD-10-CM

## 2024-01-17 DIAGNOSIS — E11.9 TYPE 2 DIABETES MELLITUS WITHOUT COMPLICATION: ICD-10-CM

## 2024-04-03 ENCOUNTER — PATIENT OUTREACH (OUTPATIENT)
Dept: ADMINISTRATIVE | Facility: HOSPITAL | Age: 69
End: 2024-04-03
Payer: MEDICARE

## 2024-04-03 NOTE — PROGRESS NOTES
VBHM Score: 6     Colon Cancer Screening  Urine Screening  Eye Exam  Hemoglobin A1c  Lipid Panel  Foot Exam    Influenza Vaccine  Tetanus Vaccine  Shingles/Zoster Vaccine  RSV Vaccine                  Health Maintenance Topic(s) Outreach Outcomes & Actions Taken:    Colorectal Cancer Screening - Outreach Outcomes & Actions Taken  : [facility]    Lab(s) - Outreach Outcomes & Actions Taken  : [facility]    Eye Exam - Outreach Outcomes & Actions Taken  : [facility]    Diabetic Foot Exam - Outreach Outcomes & Actions Taken  : [facility]    Primary Care Appt - Outreach Outcomes & Actions Taken  : [facility]                           Additional Notes:  Has not logged into Portal since 2016               Care Management, Digital Medicine, and/or Education Referrals    OPCM Risk Score: 16.3         Next Steps - Referral Actions: No answer        Additional Notes:

## 2024-05-02 ENCOUNTER — PATIENT OUTREACH (OUTPATIENT)
Dept: ADMINISTRATIVE | Facility: HOSPITAL | Age: 69
End: 2024-05-02
Payer: MEDICARE

## 2024-06-14 ENCOUNTER — LAB VISIT (OUTPATIENT)
Dept: LAB | Facility: HOSPITAL | Age: 69
End: 2024-06-14
Attending: STUDENT IN AN ORGANIZED HEALTH CARE EDUCATION/TRAINING PROGRAM
Payer: MEDICARE

## 2024-06-14 DIAGNOSIS — E11.9 TYPE 2 DIABETES MELLITUS WITHOUT COMPLICATION: ICD-10-CM

## 2024-06-14 DIAGNOSIS — E11.49 TYPE 2 DIABETES MELLITUS WITH NEUROLOGICAL MANIFESTATIONS: ICD-10-CM

## 2024-06-14 LAB
CHOLEST SERPL-MCNC: 198 MG/DL (ref 120–199)
CHOLEST/HDLC SERPL: 6 {RATIO} (ref 2–5)
ESTIMATED AVG GLUCOSE: 126 MG/DL (ref 68–131)
HBA1C MFR BLD: 6 % (ref 4–5.6)
HDLC SERPL-MCNC: 33 MG/DL (ref 40–75)
HDLC SERPL: 16.7 % (ref 20–50)
LDLC SERPL CALC-MCNC: 139.6 MG/DL (ref 63–159)
NONHDLC SERPL-MCNC: 165 MG/DL
TRIGL SERPL-MCNC: 127 MG/DL (ref 30–150)

## 2024-06-14 PROCEDURE — 80061 LIPID PANEL: CPT | Performed by: STUDENT IN AN ORGANIZED HEALTH CARE EDUCATION/TRAINING PROGRAM

## 2024-06-14 PROCEDURE — 36415 COLL VENOUS BLD VENIPUNCTURE: CPT | Mod: PO | Performed by: STUDENT IN AN ORGANIZED HEALTH CARE EDUCATION/TRAINING PROGRAM

## 2024-06-14 PROCEDURE — 83036 HEMOGLOBIN GLYCOSYLATED A1C: CPT | Performed by: STUDENT IN AN ORGANIZED HEALTH CARE EDUCATION/TRAINING PROGRAM

## 2024-06-17 NOTE — PROGRESS NOTES
Dear Mr.Elmer JEANNE Marinelli       I have reviewed your recent blood work:     Cholesterol ok. Continue meds    A1c (3 month average of blood glucose) is elevated to 6.0 = PREDIABETES.  prediabetes (A1c 5.7-6.4); diabetes = A1c >6.5.  Please limit sugary drinks (sodas, juices, sweet teas); please participate in regular daily exercise 30min 3-5 days weekly.   We will repeat in 6 months          Please do not hesitate to call or message with any additional questions or concerns.  Ness Cai MD

## 2024-06-19 DIAGNOSIS — E78.2 COMBINED HYPERLIPIDEMIA ASSOCIATED WITH TYPE 2 DIABETES MELLITUS: ICD-10-CM

## 2024-06-19 DIAGNOSIS — E11.69 COMBINED HYPERLIPIDEMIA ASSOCIATED WITH TYPE 2 DIABETES MELLITUS: ICD-10-CM

## 2024-06-21 ENCOUNTER — OFFICE VISIT (OUTPATIENT)
Dept: FAMILY MEDICINE | Facility: CLINIC | Age: 69
End: 2024-06-21
Payer: MEDICARE

## 2024-06-21 ENCOUNTER — LAB VISIT (OUTPATIENT)
Dept: LAB | Facility: HOSPITAL | Age: 69
End: 2024-06-21
Attending: STUDENT IN AN ORGANIZED HEALTH CARE EDUCATION/TRAINING PROGRAM
Payer: MEDICARE

## 2024-06-21 VITALS
SYSTOLIC BLOOD PRESSURE: 134 MMHG | HEART RATE: 81 BPM | RESPIRATION RATE: 18 BRPM | TEMPERATURE: 97 F | OXYGEN SATURATION: 96 % | WEIGHT: 315 LBS | BODY MASS INDEX: 41.75 KG/M2 | HEIGHT: 73 IN | DIASTOLIC BLOOD PRESSURE: 90 MMHG

## 2024-06-21 DIAGNOSIS — Z12.11 COLON CANCER SCREENING: ICD-10-CM

## 2024-06-21 DIAGNOSIS — E11.49 TYPE 2 DIABETES MELLITUS WITH NEUROLOGICAL MANIFESTATIONS: Chronic | ICD-10-CM

## 2024-06-21 DIAGNOSIS — E78.5 HYPERLIPIDEMIA, UNSPECIFIED HYPERLIPIDEMIA TYPE: ICD-10-CM

## 2024-06-21 DIAGNOSIS — I15.2 HYPERTENSION ASSOCIATED WITH DIABETES: ICD-10-CM

## 2024-06-21 DIAGNOSIS — E11.59 HYPERTENSION ASSOCIATED WITH DIABETES: ICD-10-CM

## 2024-06-21 DIAGNOSIS — F32.A ANXIETY AND DEPRESSION: ICD-10-CM

## 2024-06-21 DIAGNOSIS — F41.9 ANXIETY AND DEPRESSION: ICD-10-CM

## 2024-06-21 DIAGNOSIS — Z12.5 ENCOUNTER FOR SCREENING FOR MALIGNANT NEOPLASM OF PROSTATE: ICD-10-CM

## 2024-06-21 DIAGNOSIS — R05.3 CHRONIC COUGH: ICD-10-CM

## 2024-06-21 DIAGNOSIS — K21.9 GASTROESOPHAGEAL REFLUX DISEASE WITHOUT ESOPHAGITIS: ICD-10-CM

## 2024-06-21 DIAGNOSIS — Z12.5 ENCOUNTER FOR SCREENING FOR MALIGNANT NEOPLASM OF PROSTATE: Primary | ICD-10-CM

## 2024-06-21 DIAGNOSIS — E66.01 CLASS 3 SEVERE OBESITY DUE TO EXCESS CALORIES WITH SERIOUS COMORBIDITY AND BODY MASS INDEX (BMI) OF 40.0 TO 44.9 IN ADULT: ICD-10-CM

## 2024-06-21 LAB
ALBUMIN SERPL BCP-MCNC: 3.6 G/DL (ref 3.5–5.2)
ALP SERPL-CCNC: 71 U/L (ref 55–135)
ALT SERPL W/O P-5'-P-CCNC: 12 U/L (ref 10–44)
ANION GAP SERPL CALC-SCNC: 7 MMOL/L (ref 8–16)
AST SERPL-CCNC: 15 U/L (ref 10–40)
BASOPHILS # BLD AUTO: 0.04 K/UL (ref 0–0.2)
BASOPHILS NFR BLD: 0.9 % (ref 0–1.9)
BILIRUB SERPL-MCNC: 0.5 MG/DL (ref 0.1–1)
BUN SERPL-MCNC: 9 MG/DL (ref 8–23)
CALCIUM SERPL-MCNC: 8.9 MG/DL (ref 8.7–10.5)
CHLORIDE SERPL-SCNC: 100 MMOL/L (ref 95–110)
CO2 SERPL-SCNC: 29 MMOL/L (ref 23–29)
COMPLEXED PSA SERPL-MCNC: 1.9 NG/ML (ref 0–4)
CREAT SERPL-MCNC: 1.2 MG/DL (ref 0.5–1.4)
DIFFERENTIAL METHOD BLD: ABNORMAL
EOSINOPHIL # BLD AUTO: 0.2 K/UL (ref 0–0.5)
EOSINOPHIL NFR BLD: 3.3 % (ref 0–8)
ERYTHROCYTE [DISTWIDTH] IN BLOOD BY AUTOMATED COUNT: 14.7 % (ref 11.5–14.5)
EST. GFR  (NO RACE VARIABLE): >60 ML/MIN/1.73 M^2
GLUCOSE SERPL-MCNC: 88 MG/DL (ref 70–110)
HCT VFR BLD AUTO: 42.3 % (ref 40–54)
HGB BLD-MCNC: 13.6 G/DL (ref 14–18)
IMM GRANULOCYTES # BLD AUTO: 0.01 K/UL (ref 0–0.04)
IMM GRANULOCYTES NFR BLD AUTO: 0.2 % (ref 0–0.5)
LYMPHOCYTES # BLD AUTO: 2 K/UL (ref 1–4.8)
LYMPHOCYTES NFR BLD: 44 % (ref 18–48)
MCH RBC QN AUTO: 29.8 PG (ref 27–31)
MCHC RBC AUTO-ENTMCNC: 32.2 G/DL (ref 32–36)
MCV RBC AUTO: 93 FL (ref 82–98)
MONOCYTES # BLD AUTO: 0.5 K/UL (ref 0.3–1)
MONOCYTES NFR BLD: 10.8 % (ref 4–15)
NEUTROPHILS # BLD AUTO: 1.9 K/UL (ref 1.8–7.7)
NEUTROPHILS NFR BLD: 40.8 % (ref 38–73)
NRBC BLD-RTO: 0 /100 WBC
PLATELET # BLD AUTO: 214 K/UL (ref 150–450)
PMV BLD AUTO: 11 FL (ref 9.2–12.9)
POTASSIUM SERPL-SCNC: 3.6 MMOL/L (ref 3.5–5.1)
PROT SERPL-MCNC: 6.7 G/DL (ref 6–8.4)
RBC # BLD AUTO: 4.57 M/UL (ref 4.6–6.2)
SODIUM SERPL-SCNC: 136 MMOL/L (ref 136–145)
TSH SERPL DL<=0.005 MIU/L-ACNC: 1.35 UIU/ML (ref 0.4–4)
WBC # BLD AUTO: 4.55 K/UL (ref 3.9–12.7)

## 2024-06-21 PROCEDURE — 36415 COLL VENOUS BLD VENIPUNCTURE: CPT | Mod: PO | Performed by: STUDENT IN AN ORGANIZED HEALTH CARE EDUCATION/TRAINING PROGRAM

## 2024-06-21 PROCEDURE — 84443 ASSAY THYROID STIM HORMONE: CPT | Performed by: STUDENT IN AN ORGANIZED HEALTH CARE EDUCATION/TRAINING PROGRAM

## 2024-06-21 PROCEDURE — 84153 ASSAY OF PSA TOTAL: CPT | Performed by: STUDENT IN AN ORGANIZED HEALTH CARE EDUCATION/TRAINING PROGRAM

## 2024-06-21 PROCEDURE — 80053 COMPREHEN METABOLIC PANEL: CPT | Performed by: STUDENT IN AN ORGANIZED HEALTH CARE EDUCATION/TRAINING PROGRAM

## 2024-06-21 PROCEDURE — 85025 COMPLETE CBC W/AUTO DIFF WBC: CPT | Performed by: STUDENT IN AN ORGANIZED HEALTH CARE EDUCATION/TRAINING PROGRAM

## 2024-06-21 PROCEDURE — 99999 PR PBB SHADOW E&M-EST. PATIENT-LVL V: CPT | Mod: PBBFAC,,, | Performed by: STUDENT IN AN ORGANIZED HEALTH CARE EDUCATION/TRAINING PROGRAM

## 2024-06-21 RX ORDER — LOSARTAN POTASSIUM AND HYDROCHLOROTHIAZIDE 12.5; 5 MG/1; MG/1
1 TABLET ORAL DAILY
Qty: 90 TABLET | Refills: 3 | Status: SHIPPED | OUTPATIENT
Start: 2024-06-21

## 2024-06-21 RX ORDER — PANTOPRAZOLE SODIUM 40 MG/1
40 TABLET, DELAYED RELEASE ORAL DAILY
Qty: 90 TABLET | Refills: 3 | Status: SHIPPED | OUTPATIENT
Start: 2024-06-21 | End: 2025-06-21

## 2024-06-21 RX ORDER — ROSUVASTATIN CALCIUM 40 MG/1
40 TABLET, COATED ORAL NIGHTLY
Qty: 90 TABLET | Refills: 3 | Status: SHIPPED | OUTPATIENT
Start: 2024-06-21

## 2024-06-21 NOTE — PROGRESS NOTES
Assessment/Plan:    ANNUAL EXAM   patient here for annual exam.    - Labs ordered. Health maintenance was reviewed and ordered. Medications were reviewed and reconciled.  - All questions were answered. Advised of Wellness plan.   - Follow up in 1 year for ANNUAL WELLNESS EXAM      Problem List Items Addressed This Visit          Psychiatric    Anxiety and depression    Overview     Reports chronic intermittent hx; currently in remission. Feels well. Previously on xanax   Denies SI/HI; no hallucinations               Pulmonary    Cough    Overview     Chronic hx  Cxr  Cont prn flonase, antihistamine  Nightly PPI         Relevant Medications    pantoprazole (PROTONIX) 40 MG tablet    Other Relevant Orders    X-Ray Chest PA And Lateral       Cardiac/Vascular    Hypertension associated with diabetes (Chronic)    Overview     - above goal today, asymptomatic. Has been out of meds for few weeks, refilled   -2w evisit     - Current Hypertension Medications:   Hypertension Medications               losartan-hydrochlorothiazide 50-12.5 mg (HYZAAR) 50-12.5 mg per tablet Take 1 tablet by mouth once daily.          -continue lifestyle modification with low sodium diet and exercise   -discussed hypertension disease course and importance of treating high blood pressure  -patient understood and advised of risk of untreated blood pressure. ER precautions were given   for symptoms of hypertensive urgency and emergency.           Relevant Medications    losartan-hydrochlorothiazide 50-12.5 mg (HYZAAR) 50-12.5 mg per tablet    Other Relevant Orders    TSH    CBC Auto Differential    Comprehensive Metabolic Panel    MYC E-VISIT       Endocrine    Type 2 diabetes mellitus with neurological manifestations (Chronic)    Overview     Well controlled   Lab Results   Component Value Date    HGBA1C 6.0 (H) 06/14/2024       Hypoglycemic Events: none     -current meds: lifestyle controlled     -on statin:   Hyperlipidemia Medications                rosuvastatin (CRESTOR) 40 MG Tab Take 1 tablet (40 mg total) by mouth every evening.          -on ACE-I/ARB:   Hypertension Medications               losartan-hydrochlorothiazide 50-12.5 mg (HYZAAR) 50-12.5 mg per tablet Take 1 tablet by mouth once daily.          -counseling provided on importance of diabetic diet and medication compliance in order to treat diabetes  -discussed diabetes disease course and potential complications  Follow up 3 months            Relevant Orders    Ambulatory referral/consult to Optometry    Class 3 severe obesity with body mass index (BMI) of 40.0 to 44.9 in adult    Overview     Wt Readings from Last 3 Encounters:   06/21/24 0948 (!) 148.3 kg (326 lb 14.4 oz)   06/17/23 0858 (!) 144.6 kg (318 lb 12.8 oz)   03/06/23 1026 (!) 143.8 kg (317 lb)           General weight loss/lifestyle modification strategies discussed: limit sugary drinks, exercise 3-5x per week  Informal exercise measures discussed, e.g. taking stairs instead of elevator.                    GI    Gastroesophageal reflux disease without esophagitis    Overview     -symptoms controlled with daily PPI  -denies alarm symptoms, such as dysphagia, weight loss or N/V  -continue lifestyle modification with avoidance of acidic foods, caffeine, late night eating             Relevant Medications    pantoprazole (PROTONIX) 40 MG tablet     Other Visit Diagnoses       Encounter for screening for malignant neoplasm of prostate    -  Primary    Relevant Orders    PSA, Screening    Hyperlipidemia, unspecified hyperlipidemia type        Relevant Medications    rosuvastatin (CRESTOR) 40 MG Tab    Colon cancer screening        Relevant Orders    Ambulatory referral/consult to Endo Procedure                 Follow up for all my labs today, please book referral appt, recheck BP today.    Ness Cai MD  _________________________________________________________________________      Patient ID: Marcos Marinelli is a 68 y.o.  male.    Chief Complaint:  Encounter for annual exam    HPI: Patient here for a comprehensive physical exam.    Reports feeling well. Taking all meds as prescribed, except has been out of htn meds for few weeks. asx. Denies fevers, chills, chest pain, SOB, fatigue, abdominal pain, nausea, vomiting, dysuria, hematuria, hematochezia, or melena.     Colonoscopy: No personal history of colon cancer, hematochezia, melena, crohn's, ulcerative colitis; No family history of colon cancer.      Health Maintenance Topics with due status: Not Due       Topic Last Completion Date    Influenza Vaccine 10/16/2021    Lipid Panel 06/14/2024    Hemoglobin A1c 06/14/2024    Foot Exam 06/21/2024        ==============================================  History reviewed.   Health Maintenance Due   Topic Date Due    Colorectal Cancer Screening  Never done    Shingles Vaccine (1 of 2) Never done    RSV Vaccine (Age 60+ and Pregnant patients) (1 - 1-dose 60+ series) Never done    TETANUS VACCINE  12/17/2019    PROSTATE-SPECIFIC ANTIGEN  05/02/2023    Diabetes Urine Screening  08/24/2023    COVID-19 Vaccine (5 - 2023-24 season) 09/01/2023    Eye Exam  02/01/2024       Past Medical History:  Past Medical History:   Diagnosis Date    Anxiety     Arthritis     carpal tunnel    Cataract     Colon polyp 11/13/2012    repeat colonosopy in 5 years.    Combined hyperlipidemia associated with type 2 diabetes mellitus 2/28/2015    ED (erectile dysfunction)     Environmental allergies     GERD (gastroesophageal reflux disease)     Hiatal hernia 11/13/2012    found on EGD    Hyperglycemia     Hyperlipidemia LDL goal < 130     Hypertension goal BP (blood pressure) < 140/90     Insomnia     Strabismus     od as child//    Torn meniscus w-2    right knee    Tremor     controlled with xanax     Past Surgical History:   Procedure Laterality Date    ESOPHAGOGASTRODUODENOSCOPY  11/13/2012    SKIN LESION EXCISION      removed from head     Review of patient's  allergies indicates:   Allergen Reactions    Codeine      Other reaction(s): Unknown  Other reaction(s): Unknown    Escitalopram Other (See Comments)     Other reaction(s): Headache    Iodine     Penicillin      Other reaction(s): Unknown    Penicillins      Other reaction(s): Unknown    Bactrim [sulfamethoxazole-trimethoprim] Rash    Sulfa (sulfonamide antibiotics) Nausea Only and Rash     Current Outpatient Medications on File Prior to Visit   Medication Sig Dispense Refill    [DISCONTINUED] losartan-hydrochlorothiazide 50-12.5 mg (HYZAAR) 50-12.5 mg per tablet TAKE 1 TABLET BY MOUTH EVERY DAY 90 tablet 3    [DISCONTINUED] pantoprazole (PROTONIX) 40 MG tablet Take 1 tablet (40 mg total) by mouth once daily. 30 tablet 11    [DISCONTINUED] fexofenadine (ALLEGRA) 180 MG tablet Take 1 tablet (180 mg total) by mouth once daily. For sinus and allergy symptoms 90 tablet 0    [DISCONTINUED] GAVILYTE-C 240-22.72-6.72 -5.84 gram SolR  (Patient not taking: Reported on 6/21/2024)      [DISCONTINUED] methylPREDNISolone (MEDROL DOSEPACK) 4 mg tablet follow package directions (Patient not taking: Reported on 6/21/2024) 21 tablet 0    [DISCONTINUED] rosuvastatin (CRESTOR) 40 MG Tab Take 1 tablet (40 mg total) by mouth every evening. (Patient not taking: Reported on 6/21/2024) 90 tablet 3     No current facility-administered medications on file prior to visit.     Social History     Socioeconomic History    Marital status:    Tobacco Use    Smoking status: Never    Smokeless tobacco: Never   Substance and Sexual Activity    Alcohol use: No    Drug use: No    Sexual activity: Yes     Partners: Female     Family History   Problem Relation Name Age of Onset    Heart attack Mother      Hypertension Mother      Heart attack Father      Hypertension Father      Brain cancer Father      Cancer Father      Glaucoma Father      Glaucoma Paternal Aunt      Amblyopia Neg Hx      Blindness Neg Hx      Cataracts Neg Hx      Diabetes Neg  Hx      Retinal detachment Neg Hx      Strabismus Neg Hx      Stroke Neg Hx      Thyroid disease Neg Hx      Macular degeneration Neg Hx                 Objective:    Nursing note and vitals reviewed.  Vitals:    06/21/24 1052   BP: (!) 134/90   Pulse:    Resp:    Temp:      Body mass index is 43.13 kg/m².     Physical Exam   Constitutional: oriented to person, place, and time. well-developed and well-nourished. No distress.   HENT: WNL  Head: Normocephalic and atraumatic.   Eyes: Pupils are equal, round, and reactive to light. EOM are normal.   Neck: Normal range of motion. Neck supple.   Cardiovascular: Normal rate, regular rhythm, normal heart sounds and intact distal pulses. No murmur heard.  Pulmonary/Chest: Effort normal and breath sounds normal. No respiratory distress. no wheezes.   GI: soft, no ttp, non-distended   Musculoskeletal: Normal range of motion. no edema.   Feet: onychomycosis to yo great toes     Right foot: 2+ DP pulse     sensation intact to monofilament     Skin integrity: No ulcer, blister, skin breakdown, erythema. + Callus/dry skin.      Left foot: 2+ DP pulse     sensation intact to monofilament      Skin integrity: No ulcer, blister, skin breakdown, erythema. + Callus/dry skin.  Neurological: CN II-XII intact  Skin: warm and dry. Capillary refill takes less than 2 seconds.   Psychiatric: normal mood and affect. behavior is normal.           We Offer Telehealth & Same Day Appointments!   Book your Telehealth appointment with me through my nurse or   Clinic appointments on [facility]!  Hcipav-[phone]     To Schedule appointments online, go to [facility]: [email or web address]

## 2024-07-31 ENCOUNTER — TELEPHONE (OUTPATIENT)
Dept: GASTROENTEROLOGY | Facility: CLINIC | Age: 69
End: 2024-07-31
Payer: MEDICARE

## 2024-07-31 ENCOUNTER — PATIENT OUTREACH (OUTPATIENT)
Dept: ADMINISTRATIVE | Facility: HOSPITAL | Age: 69
End: 2024-07-31
Payer: MEDICARE

## 2024-07-31 VITALS — DIASTOLIC BLOOD PRESSURE: 88 MMHG | SYSTOLIC BLOOD PRESSURE: 138 MMHG

## 2024-07-31 NOTE — Clinical Note
Pt would like help scheduling Colonoscopy in [place] around the 2nd week of October. Please call [phone] to assist.

## 2024-07-31 NOTE — TELEPHONE ENCOUNTER
Called and spoke with the patient, colonoscopy scheduled with the patient, patient verbalized understanding of this, prep instructions sent to patient portal.      MiraLAX Prep      ALERT: Please notify the clinic if you start any blood thinners as does affect your procedure  NOTE: NO ASPIRIN or ibuprofen products for the morning of your procedure. This includes Motrin, ALEVE, Advil, or other arthritis type medications. TYLENOL IS ALLOWED.  AVOID the following foods for 7 - 10 days prior to the procedure: Beans, peas, corn, nuts, popcorn, or tomatoes. If you forget we will not cancel your procedure.      **You will need to purchase over-the-counter  -    4 Dulcolax laxative tablets - any brand is fine   -    2 Liter Bottle or 64 oz. of any clear liquid - see list below       (Noncarbonated, Nothing RED or PURPLE)   -    MiraLAX (16 oz ) bottle of powder     The evening before your prep day, at bedtime, take 2 Dulcolax tablets    ONE DAY BEFORE THE PROCEDURE (PREP DAY):   1. You will be on a clear liquid diet the entire day before the procedure.  2. At 10 am you will take 2 more Dulcolax laxative tablets  3. At 5 pm mix one 8oz. glass of clear liquid with TWO capfuls of Miralax and drink it entirely.  4. Repeat every 15 minutes until you have finished the 2 liter/ 64 oz. of clear fluid.      It's about 8 - 10 glasses of fluid. You may have some MiraLAX left over.      CLEAR LIQUIDS INCLUDE: Coffee (no cream), tea, apple juice, white grape juice, limit carbonated drinks to 2 in 24 hours, boullion, chicken broth, beef broth, Gatorade, Rambo-Aid, jello, popsicles, snowballs, Italian ice, and hard candy. NO ALCOHOL. NOTHING RED OR PURPLE.    It is important to drink plenty of clear fluids throughout the day prior to the procedure while doing this prep. After midnight  WATER ONLY is allowed, then nothing by mouth 4 hours prior to the procedure time.    A preop nurse will call the day prior to your procedure to discuss  medications you can and cannot take the morning of your procedure. Since sedation is used, you must have someone drive you home. It is [facility] policy that you may not take a taxi home after sedation.         NOTE:  ·         Dulaglutide (Trulicity) (weekly) - hold 8 days  ·         Exenatide extended release (Bydureon bcise) (weekly) - hold 8 days  ·         Semaglutide (Ozempic) (weekly) - hold 8 days  Tirepatide (Mounjaro) (weekly) - hold 8 days  Wegovy (weekly) - hold 8 days  ·         Exenatide (Byetta) (twice daily)- hold DAY OF PROCEDURE  ·         Liraglutide (Victoza, Saxenda) (daily)- hold DAY OF PROCEDURE  ·         Lixisenatide (Adlyxin) (daily)- hold DAY OF PROCEDURE  ·         Semaglutide (Rybelsus) (daily) -hold DAY OF PROCEDURE

## 2024-07-31 NOTE — PROGRESS NOTES
Spoke with Pt who informs he was waiting for someone in [place] to call to schedule Colonoscopy. Pt will do urine screening after DM Eye Exam & says his last BP reading was 138/88. Msg sent to [facility]. Microalbumin scheduled.       VBHM Score: 3     Colon Cancer Screening  Urine Screening  Uncontrolled BP    Tetanus Vaccine  Shingles/Zoster Vaccine  RSV Vaccine                  Health Maintenance Topic(s) Outreach Outcomes & Actions Taken:    Colorectal Cancer Screening - Outreach Outcomes & Actions Taken  : Msg sent to [place]     Lab(s) - Outreach Outcomes & Actions Taken  : Overdue Lab(s) Scheduled    Blood Pressure - Outreach Outcomes & Actions Taken  : Remote Blood Pressure Reading Captured       Additional Notes:                 Care Management, Digital Medicine, and/or Education Referrals    OPCM Risk Score: 12.7         Next Steps - Referral Actions: N/A        Additional Notes:

## 2024-07-31 NOTE — TELEPHONE ENCOUNTER
----- Message from Ciarra Suarez LPN sent at 7/31/2024  2:13 PM CDT -----  Regarding: Colonoscopy  Matthew Short :)  Not sure, maybe just call him to find out that way you can be document it due to him saying no one has called him. He just doesn't want it done in [place].  ----- Message -----  From: Deja Mcnamara LPN  Sent: 7/31/2024   2:10 PM CDT  To: MANDO Negron,  I am so sorry to have to let you know that we are currently booking in the month of December.  Would he still like to be scheduled here?  MANDO Short  ----- Message -----  From: Ciarra Suarez LPN  Sent: 7/31/2024   2:02 PM CDT  To: [facility] Gastro Clinical Staff    Pt would like help scheduling Colonoscopy in [place] around the 2nd week of October. Please call [phone] to assist.

## 2024-09-03 ENCOUNTER — PATIENT OUTREACH (OUTPATIENT)
Dept: ADMINISTRATIVE | Facility: HOSPITAL | Age: 69
End: 2024-09-03
Payer: MEDICARE

## 2024-09-05 ENCOUNTER — TELEPHONE (OUTPATIENT)
Dept: SURGERY | Facility: HOSPITAL | Age: 69
End: 2024-09-05
Payer: MEDICARE

## 2024-09-16 ENCOUNTER — TELEPHONE (OUTPATIENT)
Dept: ADMINISTRATIVE | Facility: CLINIC | Age: 69
End: 2024-09-16
Payer: MEDICARE

## 2024-09-16 NOTE — TELEPHONE ENCOUNTER
Called pt; no answer; could not confirm appt or leave message due to line kept ringing; I was calling to confirm pt's in office EAWV appt on 9/17/24.

## 2024-09-17 ENCOUNTER — OFFICE VISIT (OUTPATIENT)
Dept: FAMILY MEDICINE | Facility: CLINIC | Age: 69
End: 2024-09-17
Payer: MEDICARE

## 2024-09-17 ENCOUNTER — CLINICAL SUPPORT (OUTPATIENT)
Dept: AUDIOLOGY | Facility: CLINIC | Age: 69
End: 2024-09-17
Payer: MEDICARE

## 2024-09-17 VITALS
HEIGHT: 72 IN | SYSTOLIC BLOOD PRESSURE: 156 MMHG | BODY MASS INDEX: 42.66 KG/M2 | RESPIRATION RATE: 18 BRPM | DIASTOLIC BLOOD PRESSURE: 104 MMHG | WEIGHT: 315 LBS

## 2024-09-17 DIAGNOSIS — E66.01 CLASS 3 SEVERE OBESITY DUE TO EXCESS CALORIES WITH SERIOUS COMORBIDITY AND BODY MASS INDEX (BMI) OF 40.0 TO 44.9 IN ADULT: ICD-10-CM

## 2024-09-17 DIAGNOSIS — F32.A ANXIETY AND DEPRESSION: ICD-10-CM

## 2024-09-17 DIAGNOSIS — I15.2 HYPERTENSION ASSOCIATED WITH DIABETES: Chronic | ICD-10-CM

## 2024-09-17 DIAGNOSIS — Z00.00 ENCOUNTER FOR MEDICARE ANNUAL WELLNESS EXAM: Primary | ICD-10-CM

## 2024-09-17 DIAGNOSIS — H91.93 BILATERAL HEARING LOSS, UNSPECIFIED HEARING LOSS TYPE: ICD-10-CM

## 2024-09-17 DIAGNOSIS — M51.26 HERNIATED LUMBAR INTERVERTEBRAL DISC: ICD-10-CM

## 2024-09-17 DIAGNOSIS — H93.13 TINNITUS, BILATERAL: Primary | ICD-10-CM

## 2024-09-17 DIAGNOSIS — E11.49 TYPE 2 DIABETES MELLITUS WITH NEUROLOGICAL MANIFESTATIONS: Chronic | ICD-10-CM

## 2024-09-17 DIAGNOSIS — E78.2 COMBINED HYPERLIPIDEMIA ASSOCIATED WITH TYPE 2 DIABETES MELLITUS: ICD-10-CM

## 2024-09-17 DIAGNOSIS — E11.59 HYPERTENSION ASSOCIATED WITH DIABETES: Chronic | ICD-10-CM

## 2024-09-17 DIAGNOSIS — Z86.010 HISTORY OF COLON POLYPS: ICD-10-CM

## 2024-09-17 DIAGNOSIS — K21.9 GASTROESOPHAGEAL REFLUX DISEASE WITHOUT ESOPHAGITIS: ICD-10-CM

## 2024-09-17 DIAGNOSIS — E11.42 DIABETIC POLYNEUROPATHY ASSOCIATED WITH TYPE 2 DIABETES MELLITUS: ICD-10-CM

## 2024-09-17 DIAGNOSIS — E11.69 COMBINED HYPERLIPIDEMIA ASSOCIATED WITH TYPE 2 DIABETES MELLITUS: ICD-10-CM

## 2024-09-17 DIAGNOSIS — D64.9 NORMOCYTIC ANEMIA: ICD-10-CM

## 2024-09-17 DIAGNOSIS — F41.9 ANXIETY AND DEPRESSION: ICD-10-CM

## 2024-09-17 PROBLEM — A49.9 BACTERIAL INFECTION: Status: RESOLVED | Noted: 2023-06-17 | Resolved: 2024-09-17

## 2024-09-17 PROCEDURE — 1160F RVW MEDS BY RX/DR IN RCRD: CPT | Mod: CPTII,S$GLB,, | Performed by: PHYSICIAN ASSISTANT

## 2024-09-17 PROCEDURE — 99999 PR PBB SHADOW E&M-EST. PATIENT-LVL IV: CPT | Mod: PBBFAC,,, | Performed by: PHYSICIAN ASSISTANT

## 2024-09-17 PROCEDURE — 1101F PT FALLS ASSESS-DOCD LE1/YR: CPT | Mod: CPTII,S$GLB,, | Performed by: PHYSICIAN ASSISTANT

## 2024-09-17 PROCEDURE — 1159F MED LIST DOCD IN RCRD: CPT | Mod: CPTII,S$GLB,, | Performed by: PHYSICIAN ASSISTANT

## 2024-09-17 PROCEDURE — 3077F SYST BP >= 140 MM HG: CPT | Mod: CPTII,S$GLB,, | Performed by: PHYSICIAN ASSISTANT

## 2024-09-17 PROCEDURE — G0439 PPPS, SUBSEQ VISIT: HCPCS | Mod: S$GLB,,, | Performed by: PHYSICIAN ASSISTANT

## 2024-09-17 PROCEDURE — 92567 TYMPANOMETRY: CPT | Mod: S$GLB,,, | Performed by: AUDIOLOGIST-HEARING AID FITTER

## 2024-09-17 PROCEDURE — 3072F LOW RISK FOR RETINOPATHY: CPT | Mod: CPTII,S$GLB,, | Performed by: PHYSICIAN ASSISTANT

## 2024-09-17 PROCEDURE — 3080F DIAST BP >= 90 MM HG: CPT | Mod: CPTII,S$GLB,, | Performed by: PHYSICIAN ASSISTANT

## 2024-09-17 PROCEDURE — 99999 PR PBB SHADOW E&M-EST. PATIENT-LVL I: CPT | Mod: PBBFAC,,, | Performed by: AUDIOLOGIST-HEARING AID FITTER

## 2024-09-17 PROCEDURE — 3288F FALL RISK ASSESSMENT DOCD: CPT | Mod: CPTII,S$GLB,, | Performed by: PHYSICIAN ASSISTANT

## 2024-09-17 PROCEDURE — 92557 COMPREHENSIVE HEARING TEST: CPT | Mod: S$GLB,,, | Performed by: AUDIOLOGIST-HEARING AID FITTER

## 2024-09-17 PROCEDURE — 1170F FXNL STATUS ASSESSED: CPT | Mod: CPTII,S$GLB,, | Performed by: PHYSICIAN ASSISTANT

## 2024-09-17 PROCEDURE — 3044F HG A1C LEVEL LT 7.0%: CPT | Mod: CPTII,S$GLB,, | Performed by: PHYSICIAN ASSISTANT

## 2024-09-17 PROCEDURE — 1158F ADVNC CARE PLAN TLK DOCD: CPT | Mod: CPTII,S$GLB,, | Performed by: PHYSICIAN ASSISTANT

## 2024-09-17 RX ORDER — LOSARTAN POTASSIUM AND HYDROCHLOROTHIAZIDE 12.5; 1 MG/1; MG/1
1 TABLET ORAL DAILY
Qty: 90 TABLET | Refills: 3 | Status: SHIPPED | OUTPATIENT
Start: 2024-09-17 | End: 2025-09-17

## 2024-09-17 NOTE — PATIENT INSTRUCTIONS
Taran Rodríguez,     If you are due for any health screening(s) below please notify me so we can arrange them to be ordered and scheduled. Most healthy patients at your age complete them, but you are free to accept or refuse.     If you can't do it, I'll definitely understand. If you can, I'd certainly appreciate it!    Tests to Keep You Healthy    Eye Exam: SCHEDULED  Colon Cancer Screening: DUE  Last Blood Pressure <= 139/89 (9/17/2024): Yes  Last HbA1c < 8 (06/14/2024): Yes      Its time for your colon cancer screening     Colorectal cancer is one of the leading causes of cancer death for men and women but it doesnt have to be. Screenings can prevent colorectal cancer or find it early enough to treat and cure the disease.     Our records indicate that you may be overdue for colon cancer screening. A colonoscopy or stool screening test can help identify patients at risk for developing colon cancer. Cancer screenings save lives, so schedule yours today to stay healthy.     A colonoscopy is the preferred test for detecting colon cancer. It is needed only once every 10 years if results are negative. While you are sedated, a flexible, lighted tube with a tiny camera is inserted into the rectum and advanced through the colon to look for cancers.     An alternative screening test that is used at home and returned to the lab may also be used. It detects hidden blood in bowel movements which could indicate cancer in the colon. If results are positive, you will need a colonoscopy to determine if the blood is a sign of cancer. This type of follow up (diagnostic) colonoscopy usually requires additional copays as required by your insurance provider.     If you recently had your colon cancer screening performed outside of [facility], please let your Health care team know so that they can update your health record. Please contact your PCP if you have any questions.    Lets manage your high blood pressure     Your blood  pressure was above 140/90 today during your visit. We recommend that you schedule a nurse visit in two weeks to check your blood pressure and discuss ways to support your health goals.     You can also manage your health and record your blood pressure from the comfort of home by keeping a daily blood pressure log. These results are shared with and reviewed by your provider. Please print this form (Daily Blood Pressure Log) to assist you in keeping track of your blood pressure at home.     Schedule your nurse visit in two weeks to learn more about how to track and manage high blood pressure.    Daily Blood Pressure Log    Name:__________________________________                  Date of Birth:_________    Average Blood Pressure:  __________      Date: Time  (a.m.) Blood  Pressure: Pulse  Rate: Time  (p.m.) Blood  Pressure : Pulse  Rate:   Sample 8:37 127/83 84                                                                                                                                                                                 Your diabetic retinal eye exam is due     Diabetes is the #1 cause of blindness in the [place] - early detection before signs or symptoms develop can prevent debilitating blindness.     Our records indicate that you may be overdue for your annual diabetic eye exam. Eye screening can help identify patients at risk for developing vision loss which is common in diabetes. This simple screening is an important step to keeping you healthy and preventing complications from diabetes.     This recommended diabetic eye exam should take place once per year and can prevent and treat diabetes complications in the eye before developing symptoms. This can be done with a special camera is used to take photographs of the back of your eye without having to dilate them, or you can see an eye doctor for a full dilated exam.     If you recently had your yearly diabetic eye exam performed outside of [facility], please let your Health care team know so that they can update your health record.                    Counseling and Referral of Other Preventative  (Italic type indicates deductible and co-insurance are waived)    Patient Name: Marcos Marinelli  Today's Date: 9/17/2024    Health Maintenance       Date Due Completion Date    Colorectal Cancer Screening Never done ---    Shingles Vaccine (1 of 2) Never done ---    RSV Vaccine (Age 60+ and Pregnant patients) (1 - 1-dose 60+ series) Never done ---    TETANUS VACCINE 12/17/2019 12/17/2009    Diabetes Urine Screening 08/24/2023 8/24/2022    Eye Exam 02/01/2024 2/1/2023    Influenza Vaccine (1) 09/01/2024 10/16/2021    Hemoglobin A1c 12/14/2024 6/14/2024    COVID-19 Vaccine (6 - 2023-24 season) 01/01/2025 9/1/2024    Lipid Panel 06/14/2025 6/14/2024    PROSTATE-SPECIFIC ANTIGEN 06/21/2025 6/21/2024    Foot Exam 06/21/2025 6/21/2024 (Done)    Override on 6/21/2024: Done    Override on 7/15/2013: Done        Orders Placed This Encounter   Procedures    Microalbumin/Creatinine Ratio, Urine    Ambulatory referral/consult to Audiology    Ambulatory referral/consult to Ophthalmology       The following information is provided to all patients.  This information is to help you find resources for any of the problems found today that may be affecting your health:                  Living healthy guide: www.[facility].[place].gov      Understanding Diabetes: www.diabetes.org      Eating healthy: www.cdc.gov/healthyweight      CDC home safety checklist: www.cdc.gov/steadi/patient.html      Agency on Aging: www.goea.[place].[facility]      Alcoholics anonymous (AA): www.aa.org      Physical Activity: www.elbert.nih.gov/mr8lmnr      Tobacco use: www.quitwithusla.org

## 2024-09-17 NOTE — PROGRESS NOTES
Marcos Marinelli was seen on 09/17/2024 for an audiological evaluation. Pt was alone during today's visit. Pertinent complaints today include hearing loss and tinnitus AU. Pt confirms history of loud noise exposure and denies early onset of genetic family history of hearing loss. Otoscopy revealed moderate cerumen in the left ear. The tympanic membrane was visualized AU prior to proceeding with the hearing testing. He says he has difficulty understanding conversation, especially in the presence of background noise.      Results reveal a bilateral normal sloping to moderate HF sensorineural hearing loss.    Speech Reception Thresholds were  10 dBHL for the right ear and 10 dBHL for the left ear.    Word recognition scores were excellent for the right ear and excellent for the left ear.   Tympanograms were Type A for the right ear and Type A for the left ear.    Audiogram results were reviewed in detail with patient and all questions were answered. Results will be reviewed by the referring provider at the completion of this note. All complaints were addressed during this visit to the patient's satisfaction.  Recommend binaural amplification pending medical clearance, repeat hearing testing in one year due to tinnitus and noise exposure and bilateral hearing protection with either muffs or in-ear protection in loud noises. Plan of care was discussed in detail with the patient, who agreed with the plan as above.

## 2024-09-17 NOTE — Clinical Note
Thank you for your referral to audiology. Hearing testing completed.  Results reveal a bilateral normal sloping to moderate HF sensorineural hearing loss.    Speech Reception Thresholds were  10 dBHL for the right ear and 10 dBHL for the left ear.    Word recognition scores were excellent for the right ear and excellent for the left ear.   Tympanograms were Type A for the right ear and Type A for the left ear.                      Recommend binaural amplification pending medical clearance and repeat hearing testing in one year due to tinnitus and noise exposure.

## 2024-09-17 NOTE — PROGRESS NOTES
Marcos Marinelli presented for a follow-up Medicare AWV today. The following components were reviewed and updated:    Medical history  Family History  Social history  Allergies and Current Medications  Health Risk Assessment  Health Maintenance  Care Team    **See Completed Assessments for Annual Wellness visit with in the encounter summary    The following assessments were completed:  Depression Screening  Cognitive function Screening  Timed Get Up Test  Whisper Test      Opioid documentation:      Patient does not have a current opioid prescription.          Vitals:    09/17/24 1048 09/17/24 1125   BP: (!) 167/108 (!) 156/104   Pulse: (P) 88    Resp: 18    SpO2: (P) 98%    Weight: (!) 148.3 kg (326 lb 14.4 oz)    Height: 6' (1.829 m)      Body mass index is 44.34 kg/m².       Physical Exam  Constitutional:       General: He is not in acute distress.     Appearance: Normal appearance.   HENT:      Head: Normocephalic and atraumatic.   Eyes:      Pupils: Pupils are equal, round, and reactive to light.   Cardiovascular:      Rate and Rhythm: Normal rate and regular rhythm.      Pulses: Normal pulses.      Heart sounds: Normal heart sounds. No murmur heard.  Pulmonary:      Effort: Pulmonary effort is normal. No respiratory distress.      Breath sounds: Normal breath sounds.   Abdominal:      General: Bowel sounds are normal. There is no distension.      Palpations: Abdomen is soft.      Tenderness: There is no abdominal tenderness.   Musculoskeletal:         General: Normal range of motion.      Cervical back: Normal range of motion and neck supple.   Skin:     General: Skin is warm and dry.      Findings: No rash.   Neurological:      General: No focal deficit present.      Mental Status: He is alert and oriented to person, place, and time.   Psychiatric:         Mood and Affect: Mood normal.         Behavior: Behavior normal.           Diagnoses and health risks identified today and associated  recommendations/orders:  1. Encounter for Medicare annual wellness exam  Complete history and physical was completed today. Complete and thorough medication reconciliation was performed. Discussed risks and benefits of medications. Advised patient on orders and health maintenance. Continue current medications listed on your summary sheet. Discussed immunizations due.     2. Herniated lumbar intervertebral disc  Chronic. Stable  Remains on PRN Tylenol/NSAIDs  Continue current medications and plan of care per PCP and specialists     3. Diabetic polyneuropathy associated with type 2 diabetes mellitus  Chronic. Stable  Continue current medications and plan of care per PCP and specialists     4. Anxiety and depression  Chronic. Stable  Not currently on medication  Continue current medications and plan of care per PCP and specialists     5. Hypertension associated with diabetes  Chronic. Stable  BP above goal today; asymptomatic  Remains on Losartan-HCTZ 50-12.5 mg QD-will increase to 100-12.5 mg QD  Monitor BP closely at home; BP check in 2 weeks  Continue lifestyle modification with low sodium diet and exercise   Discussed hypertension disease course and importance of treating high blood pressure  Patient understood and advised of risk of untreated blood pressure. ER precautions were given   for symptoms of hypertensive urgency and emergency.  Continue current medications and plan of care per PCP and specialists   - losartan-hydrochlorothiazide 100-12.5 mg (HYZAAR) 100-12.5 mg Tab; Take 1 tablet by mouth once daily.  Dispense: 90 tablet; Refill: 3    6. Combined hyperlipidemia associated with type 2 diabetes mellitus  Chronic. Stable  Remains on Crestor  Continue current medications and plan of care per PCP and specialists   Most recent labs listed below:  Lab Results   Component Value Date    CHOL 198 06/14/2024     Lab Results   Component Value Date    HDL 33 (L) 06/14/2024     Lab Results   Component Value Date     LDLCALC 139.6 06/14/2024     Lab Results   Component Value Date    TRIG 127 06/14/2024     Lab Results   Component Value Date    ALT 12 06/21/2024    AST 15 06/21/2024    ALKPHOS 71 06/21/2024    BILITOT 0.5 06/21/2024     7. Normocytic anemia  Chronic. Stable  Continue current medications and plan of care per PCP and specialists   Lab Results   Component Value Date    WBC 4.55 06/21/2024    HGB 13.6 (L) 06/21/2024    HCT 42.3 06/21/2024    MCV 93 06/21/2024     06/21/2024     8. Type 2 diabetes mellitus with neurological manifestations  Chronic. Stable  Not currently on medication  Due for microalbumin urine test  Also due for eye exam  Counseling provided on importance of diabetic diet and medication compliance in order to treat diabetes  Discussed diabetes disease course and potential complications  Continue current medications and plan of care per PCP and specialists   Lab Results   Component Value Date    HGBA1C 6.0 (H) 06/14/2024   - Ambulatory referral/consult to Ophthalmology; Future  - Microalbumin/Creatinine Ratio, Urine; Future    9. Class 3 severe obesity due to excess calories with serious comorbidity and body mass index (BMI) of 40.0 to 44.9 in adult  Chronic. Stable  General weight loss/lifestyle modification strategies discussed (elicit support from others; identify saboteurs; non-food rewards, etc).  Informal exercise measures discussed, e.g. taking stairs instead of elevator.  Regular aerobic exercise program discussed.  Continue current medications and plan of care per PCP and specialists     10. Gastroesophageal reflux disease without esophagitis  Chronic. Stable  Remains on daily PPI  Continue lifestyle modification with avoidance of acidic foods, caffeine, late night eating  Continue current medications and plan of care per PCP and specialists     11. History of colon polyps  Chronic. Stable  Noted on previous c-scope; due for repeat  Continue current medications and plan of care per PCP  and specialists     12. Bilateral hearing loss, unspecified hearing loss type  - Ambulatory referral/consult to Audiology; Future        Provided Marcos with a 5-10 year written screening schedule and personal prevention plan. Recommendations were developed using the USPSTF age appropriate recommendations. Education, counseling, and referrals were provided as needed.  After Visit Summary printed and given to patient which includes a list of additional screenings\tests needed.    Follow up in about 3 months (around 12/17/2024), or if symptoms worsen or fail to improve, for with PCP.      Keshia Villalpando PA-C      I offered to discuss advanced care planning, including how to pick a person who would make decisions for you if you were unable to make them for yourself, called a health care power of , and what kind of decisions you might make such as use of life sustaining treatments such as ventilators and tube feeding when faced with a life limiting illness recorded on a living will that they will need to know. (How you want to be cared for as you near the end of your natural life)     X Patient is interested in learning more about how to make advanced directives.  I provided them paperwork and offered to discuss this with them.

## 2024-10-21 ENCOUNTER — PATIENT OUTREACH (OUTPATIENT)
Dept: ADMINISTRATIVE | Facility: HOSPITAL | Age: 69
End: 2024-10-21
Payer: MEDICARE

## 2024-10-24 ENCOUNTER — LAB VISIT (OUTPATIENT)
Dept: LAB | Facility: HOSPITAL | Age: 69
End: 2024-10-24
Payer: MEDICARE

## 2024-10-24 ENCOUNTER — OFFICE VISIT (OUTPATIENT)
Dept: OPTOMETRY | Facility: CLINIC | Age: 69
End: 2024-10-24
Payer: MEDICARE

## 2024-10-24 DIAGNOSIS — H43.393 VITREOUS FLOATERS, BILATERAL: ICD-10-CM

## 2024-10-24 DIAGNOSIS — H52.13 MYOPIA WITH ASTIGMATISM AND PRESBYOPIA, BILATERAL: ICD-10-CM

## 2024-10-24 DIAGNOSIS — Z13.5 GLAUCOMA SCREENING: ICD-10-CM

## 2024-10-24 DIAGNOSIS — E11.49 TYPE 2 DIABETES MELLITUS WITH NEUROLOGICAL MANIFESTATIONS: ICD-10-CM

## 2024-10-24 DIAGNOSIS — E11.9 DIABETES MELLITUS TYPE 2 WITHOUT RETINOPATHY: Primary | ICD-10-CM

## 2024-10-24 DIAGNOSIS — E11.49 TYPE 2 DIABETES MELLITUS WITH NEUROLOGICAL MANIFESTATIONS: Chronic | ICD-10-CM

## 2024-10-24 DIAGNOSIS — H52.203 MYOPIA WITH ASTIGMATISM AND PRESBYOPIA, BILATERAL: ICD-10-CM

## 2024-10-24 DIAGNOSIS — H52.4 MYOPIA WITH ASTIGMATISM AND PRESBYOPIA, BILATERAL: ICD-10-CM

## 2024-10-24 DIAGNOSIS — E11.36 CATARACT ASSOCIATED WITH TYPE 2 DIABETES MELLITUS: ICD-10-CM

## 2024-10-24 LAB
ALBUMIN/CREAT UR: 8.3 UG/MG (ref 0–30)
CREAT UR-MCNC: 241 MG/DL (ref 23–375)
MICROALBUMIN UR DL<=1MG/L-MCNC: 20 UG/ML

## 2024-10-24 PROCEDURE — 1101F PT FALLS ASSESS-DOCD LE1/YR: CPT | Mod: CPTII,S$GLB,, | Performed by: OPTOMETRIST

## 2024-10-24 PROCEDURE — 82043 UR ALBUMIN QUANTITATIVE: CPT | Performed by: STUDENT IN AN ORGANIZED HEALTH CARE EDUCATION/TRAINING PROGRAM

## 2024-10-24 PROCEDURE — 1159F MED LIST DOCD IN RCRD: CPT | Mod: CPTII,S$GLB,, | Performed by: OPTOMETRIST

## 2024-10-24 PROCEDURE — 1126F AMNT PAIN NOTED NONE PRSNT: CPT | Mod: CPTII,S$GLB,, | Performed by: OPTOMETRIST

## 2024-10-24 PROCEDURE — 92015 DETERMINE REFRACTIVE STATE: CPT | Mod: S$GLB,,, | Performed by: OPTOMETRIST

## 2024-10-24 PROCEDURE — 3044F HG A1C LEVEL LT 7.0%: CPT | Mod: CPTII,S$GLB,, | Performed by: OPTOMETRIST

## 2024-10-24 PROCEDURE — 82570 ASSAY OF URINE CREATININE: CPT | Performed by: STUDENT IN AN ORGANIZED HEALTH CARE EDUCATION/TRAINING PROGRAM

## 2024-10-24 PROCEDURE — 92014 COMPRE OPH EXAM EST PT 1/>: CPT | Mod: S$GLB,,, | Performed by: OPTOMETRIST

## 2024-10-24 PROCEDURE — 99999 PR PBB SHADOW E&M-EST. PATIENT-LVL II: CPT | Mod: PBBFAC,,, | Performed by: OPTOMETRIST

## 2024-10-24 PROCEDURE — 3288F FALL RISK ASSESSMENT DOCD: CPT | Mod: CPTII,S$GLB,, | Performed by: OPTOMETRIST

## 2024-10-24 NOTE — PROGRESS NOTES
HPI    Routine dm-dle-2/23    Pt denies any blurred va. Some trouble with light at night. Denies any   flashes, some floaters. States he can see figures when in the dark OS. BSL   controlled.    Hemoglobin A1C       Date                     Value               Ref Range             Status                06/14/2024               6.0 (H)             4.0 - 5.6 %           Final              Comment:    ADA Screening Guidelines:  5.7-6.4%  Consistent with   prediabetes  >or=6.5%  Consistent with diabetes    High levels of fetal   hemoglobin interfere with the HbA1C  assay. Heterozygous hemoglobin   variants (HbS, HgC, etc)do  not significantly interfere with this assay.     However, presence of multiple variants may affect accuracy.         05/02/2022               5.9 (H)             4.0 - 5.6 %           Final              Comment:    ADA Screening Guidelines:  5.7-6.4%  Consistent with   prediabetes  >or=6.5%  Consistent with diabetes    High levels of fetal   hemoglobin interfere with the HbA1C  assay. Heterozygous hemoglobin   variants (HbS, HgC, etc)do  not significantly interfere with this assay.     However, presence of multiple variants may affect accuracy.         04/10/2018               5.8 (H)             4.0 - 5.6 %           Final              Comment:    According to ADA guidelines, hemoglobin A1c <7.0% represents  optimal   control in non-pregnant diabetic patients. Different  metrics may apply to   specific patient populations.   Standards of Medical Care in   Diabetes-2016.  For the purpose of screening for the presence of   diabetes:  <5.7%     Consistent with the absence of diabetes  5.7-6.4%    Consistent with increasing risk for diabetes   (prediabetes)  >or=6.5%    Consistent with diabetes  Currently, no consensus exists for use of   hemoglobin A1c  for diagnosis of diabetes for children.  This Hemoglobin   A1c assay has significant interference with fetal   hemoglobin   (HbF).   The results are  invalid for patients with abnormal amounts of   HbF,     including those with known Hereditary Persistence   of Fetal Hemoglobin.   Heterozygous hemoglobin variants (HbAS, HbAC,   HbAD, HbAE, HbA2) do not   significantly interfere with this assay;   however, presence of multiple   variants in a sample may impact the %   interference.    ----------    Last edited by Mahnaz Rolon on 10/24/2024  9:30 AM.            Assessment /Plan     For exam results, see Encounter Report.    Diabetes mellitus type 2 without retinopathy    Type 2 diabetes mellitus with neurological manifestations  -     Ambulatory referral/consult to Ophthalmology    Cataract associated with type 2 diabetes mellitus    Vitreous floaters, bilateral    Glaucoma screening    Myopia with astigmatism and presbyopia, bilateral      1,2. No tod/ no csme, gave Diabetic Retinopathy info, advise tight control glucose, BP---Advise annual dilated fundus exam  3.   Vis sig NS cataracts, OU. Not ready for consult, gave info, cautions driving especially at night. CE possible in 2-3 yrs   4.   RD precautions given and reviewed. Patient knows to call/ message if any further changes in symptoms occur.  5.   Not suspect // was able to obtain IOP today ---pt very sens   6.   Updated specs rx gave copy, discussed options // fill prn --ok continue with current     Discussed and educated patient on current findings /plan.  RTC 1 year annual --doc can check IOP  // pt very sens , prn if any changes / issues

## 2024-10-24 NOTE — PATIENT INSTRUCTIONS
"DRY EYES -- BURNING OR CINDY SYMPTOMS:  Use Over The Counter artificial tears as needed for dry eye symptoms.   Some common brands include:  Systane, Optive, Refresh, and Thera-Tears.  These drops can be used as frequently as desired, but may be most helpful use during long periods of concentrated work.  For example, reading / working at the computer. Start with 3-4x per day.     Nighttime Ophthalmic gel or ointments are available: Refresh PM, Genteal, and Lacrilube.    Avoid drops that "get redness out" (Visine, Murine, Clear Eyes), as these may contain medication that could further irritate the eyes, especially with chronic use.    ALLERGY EYES -- ITCHING SYMPTOMS:  Over the counter medications include--Pataday, Zaditor, and Alaway.  Use as directed 1-2 drops daily for symptoms of itching / watering eyes.  These drops will not help for dry eye or exposure symptoms.    REDNESS RELIEF:  Lumify---is a good redness reliever that will not cause irritation if used chronically.        DIABETES AND THE EYE / DIABETIC RETINOPATHY    Diabetic retinopathy is a condition occurring in persons with diabetes, which causes progressive damage to the retina, the light sensitive lining at the back of the eye. It is a serious sight-threatening complication of diabetes.    Diabetic retinopathy is the result of damage to the tiny blood vessels that nourish the retina. They leak blood and other fluids that cause swelling of retinal tissue and clouding of vision. The condition usually affects both eyes. The longer a person has diabetes, the more likely they will develop diabetic retinopathy. If left untreated, diabetic retinopathy can cause blindness.  There are two basic types of diabetic retinopathy:    Background or nonproliferative diabetic retinopathy (NPDR)  Nonproliferative diabetic retinopathy (NPDR) is the earliest stage of diabetic retinopathy. With this condition, damaged blood vessels in the retina begin to leak extra fluid " and small amounts of blood into the eye. Sometimes, deposits of cholesterol or other fats from the blood may leak into the retina. Many people with diabetes have mild NPDR, which usually does not affect their vision. However, if their vision is affected, it is the result of macular edema and macular ischemia.    If vision is affected due to macular changes, a consult with a Retina Specialist may be advised.  This is an ophthalmologist that treats retina conditions, including diabetic retinopathy.     Proliferative diabetic retinopathy (PDR)  Proliferative diabetic retinopathy (PDR) mainly occurs when many of the blood vessels in the retina close, preventing enough blood flow. In an attempt to supply blood to the area where the original vessels closed, the retina responds by growing new blood vessels. This is called neovascularization. However, these new blood vessels are abnormal and do not supply the retina with proper blood flow. The new vessels are also often accompanied by scar tissue that may cause the retina to wrinkle or detach. PDR may cause more severe vision loss than NPDR because it can affect both central and peripheral vision.     A patient diagnosed with proliferative diabetic eye disease will be referred to a retinal specialist for consultation.    Often there are no visual symptoms in the early stages of diabetic retinopathy. That is why our eye care professionals recommend that everyone with diabetes have a comprehensive dilated eye examination once a year. Early detection and treatment can limit the potential for significant vision loss from diabetic retinopathy.       FLASHES / FLOATERS / POSTERIOR VITREOUS DETACHMENT    Call the clinic if you have any further changes in symptoms.  Including:  Increased numbers of floaters or flashing lights, dimness or darkness that moves through or stays constant in your vision, or any pain in the eye (s).    You may sometimes see small specks or clouds moving  in your field of vision.  They are called FLOATERS.  You can often see them when looking at a plain background, like a blank wall or blue baldomero.  Floaters are actually tiny clumps of gel or cells inside the VITREOUS, the clear jelly-like fluid that fills the inside of your eye.    While these objects look like they are in front of your eye, they are actually floating inside.  What you see are the shadows they cast on the RETINA, the nerve layer at the back of the eye that senses light and allows you to see.      POSTERIOR VITREOUS DETACHMENT    The appearance of new floaters may be alarming.  If you suddenly develop new floaters, you should contact your eye care professional  right away.    The retina can tear if the shrinking vitreous pulls away from the wall of the eye.  This sometimes causes a small amount of bleeding in the eye that may appear as new floaters.    A torn retina is always a serious problem, since it can lead to a retinal detachment.  You should see your eye care professional as soon as possible if:    even one new floater appears suddenly;  you see sudden flashes of light;  you notice other symptoms, like the loss of side vision, or a curtain closes down in your vision        POSTERIOR VITREOUS DETACHMENT is more common for people who:    are nearsighted;  have had cataract surgery;  have had YAG laser surgery of the eye;  have had inflammation inside the eye;  are over age 60.      While some floaters may remain visible, many of them will fade over time and become less noticeable.  Even if you've had some floaters for years, you should have your eyes checked as soon as possible if you notice new ones.    FLASHING LIGHTS    When the vitreous gel rubs or pulls on the retina, you may see what look like flashing lights or lightning streaks.  These flashes can appear off and on for several weeks or months.      Some people experience flashes of light that appear as jagged lines or heat waves in both  eyes, lasting 10-20 minutes.  These flashes are caused by a spasm of blood vessels in the brain, which is called a migraine.    If a headache follows these flashes, it's called a migraine headache.  If   no headache occurs, these flashes are called Ophthalmic or Ocular Migraine.           Early Cataracts--not visually significant for surgery consultation.    What Are Cataracts?  A clear lens in the eye focuses light. This lets the eye see images sharply. With age, the lens slowly becomes cloudy. The cloudy lens is a cataract. A cataract scatters light and makes it hard for the eye to focus. Cataracts often form in both eyes. But one lens may cloud faster than the other.      The Aging of Your Lens    Your lens may cloud so slowly that you don`t notice any vision changes at first. But as the cataract gets worse, the eye has a harder time focusing. In early stages, glasses may help you see better. As the lens gets cloudier, your doctor may recommend surgery to restore your vision.

## 2024-12-24 ENCOUNTER — PATIENT OUTREACH (OUTPATIENT)
Dept: ADMINISTRATIVE | Facility: HOSPITAL | Age: 69
End: 2024-12-24
Payer: MEDICARE

## 2025-01-08 DIAGNOSIS — E11.9 TYPE 2 DIABETES MELLITUS WITHOUT COMPLICATION: ICD-10-CM

## 2025-01-31 ENCOUNTER — TELEPHONE (OUTPATIENT)
Dept: FAMILY MEDICINE | Facility: CLINIC | Age: 70
End: 2025-01-31
Payer: MEDICAID

## 2025-01-31 NOTE — TELEPHONE ENCOUNTER
----- Message from Cherry sent at 1/31/2025  7:51 AM CST -----  Contact: Marcos  Type:  RX Refill Request    Who Called: Marcos  Refill or New Rx: Rx  RX Name and Strength: Antibiotics for sinus infection  How is the patient currently taking it? (ex. 1XDay):  Is this a 30 day or 90 day RX:  Preferred Pharmacy with phone number:   Local or Mail Order: Local  Ordering Provider: PACHECO Reyes  Would the patient rather a call back or a response via [facility]?   Best Call Back Number:  Additional Information: Need to change Pharmacy to [facility] [address] [facility] [place] [address] Phone: [phone]

## 2025-01-31 NOTE — TELEPHONE ENCOUNTER
----- Message from Edilia sent at 1/31/2025  2:39 PM CST -----  Contact: Rafael  .Type:  Patient Requesting Call    Who Called:RAFAEL RAPHAEL [[id number]]  Does the patient know what this is regarding?:Patient requesting a call back in regards to medication that is needing to be called in.  Would the patient rather a call back or a response via [facility]? Call back  Best Call Back Number:[phone]   Additional Information: antibiotic

## 2025-02-01 ENCOUNTER — OFFICE VISIT (OUTPATIENT)
Dept: FAMILY MEDICINE | Facility: CLINIC | Age: 70
End: 2025-02-01
Payer: MEDICARE

## 2025-02-01 VITALS
SYSTOLIC BLOOD PRESSURE: 154 MMHG | HEART RATE: 78 BPM | DIASTOLIC BLOOD PRESSURE: 100 MMHG | TEMPERATURE: 98 F | OXYGEN SATURATION: 99 % | WEIGHT: 315 LBS | BODY MASS INDEX: 41.75 KG/M2 | HEIGHT: 73 IN

## 2025-02-01 DIAGNOSIS — J32.9 SINUSITIS, UNSPECIFIED CHRONICITY, UNSPECIFIED LOCATION: Primary | ICD-10-CM

## 2025-02-01 DIAGNOSIS — R05.9 COUGH, UNSPECIFIED TYPE: ICD-10-CM

## 2025-02-01 PROCEDURE — 87635 SARS-COV-2 COVID-19 AMP PRB: CPT | Mod: QW,S$GLB,, | Performed by: NURSE PRACTITIONER

## 2025-02-01 PROCEDURE — 1160F RVW MEDS BY RX/DR IN RCRD: CPT | Mod: CPTII,S$GLB,, | Performed by: NURSE PRACTITIONER

## 2025-02-01 PROCEDURE — 3080F DIAST BP >= 90 MM HG: CPT | Mod: CPTII,S$GLB,, | Performed by: NURSE PRACTITIONER

## 2025-02-01 PROCEDURE — 99999 PR PBB SHADOW E&M-EST. PATIENT-LVL IV: CPT | Mod: PBBFAC,,, | Performed by: NURSE PRACTITIONER

## 2025-02-01 PROCEDURE — 1101F PT FALLS ASSESS-DOCD LE1/YR: CPT | Mod: CPTII,S$GLB,, | Performed by: NURSE PRACTITIONER

## 2025-02-01 PROCEDURE — 3008F BODY MASS INDEX DOCD: CPT | Mod: CPTII,S$GLB,, | Performed by: NURSE PRACTITIONER

## 2025-02-01 PROCEDURE — 87502 INFLUENZA DNA AMP PROBE: CPT | Mod: QW,S$GLB,, | Performed by: NURSE PRACTITIONER

## 2025-02-01 PROCEDURE — 3288F FALL RISK ASSESSMENT DOCD: CPT | Mod: CPTII,S$GLB,, | Performed by: NURSE PRACTITIONER

## 2025-02-01 PROCEDURE — 1159F MED LIST DOCD IN RCRD: CPT | Mod: CPTII,S$GLB,, | Performed by: NURSE PRACTITIONER

## 2025-02-01 PROCEDURE — 3077F SYST BP >= 140 MM HG: CPT | Mod: CPTII,S$GLB,, | Performed by: NURSE PRACTITIONER

## 2025-02-01 PROCEDURE — 99213 OFFICE O/P EST LOW 20 MIN: CPT | Mod: S$GLB,,, | Performed by: NURSE PRACTITIONER

## 2025-02-01 PROCEDURE — 1126F AMNT PAIN NOTED NONE PRSNT: CPT | Mod: CPTII,S$GLB,, | Performed by: NURSE PRACTITIONER

## 2025-02-01 RX ORDER — AZITHROMYCIN 250 MG/1
TABLET, FILM COATED ORAL
Qty: 6 TABLET | Refills: 0 | Status: SHIPPED | OUTPATIENT
Start: 2025-02-01 | End: 2025-02-06

## 2025-02-01 RX ORDER — BENZONATATE 200 MG/1
200 CAPSULE ORAL 3 TIMES DAILY PRN
Qty: 30 CAPSULE | Refills: 0 | Status: CANCELLED | OUTPATIENT
Start: 2025-02-01 | End: 2025-02-11

## 2025-02-01 NOTE — PROGRESS NOTES
Subjective     Patient ID: Marcos Marinelli is a 69 y.o. male.    Chief Complaint: Sinusitis    Sinusitis  This is a new problem. The current episode started in the past 7 days. The problem has been gradually worsening since onset. There has been no fever. The pain is mild. Associated symptoms include congestion, coughing (Declines medication for cough), headaches and sinus pressure. Pertinent negatives include no chills, diaphoresis, ear pain, hoarse voice, neck pain, shortness of breath, sneezing, sore throat or swollen glands. Past treatments include saline nose sprays. The treatment provided no relief.     Past Medical History:   Diagnosis Date    Anxiety     Arthritis     carpal tunnel    Cataract     Colon polyp 11/13/2012    repeat colonosopy in 5 years.    Combined hyperlipidemia associated with type 2 diabetes mellitus 2/28/2015    ED (erectile dysfunction)     Environmental allergies     GERD (gastroesophageal reflux disease)     Hiatal hernia 11/13/2012    found on EGD    Hyperglycemia     Hyperlipidemia LDL goal < 130     Hypertension goal BP (blood pressure) < 140/90     Insomnia     Strabismus     od as child//    Torn meniscus w-2    right knee    Tremor     controlled with xanax     Social History     Socioeconomic History    Marital status:    Tobacco Use    Smoking status: Never    Smokeless tobacco: Never   Substance and Sexual Activity    Alcohol use: No    Drug use: No    Sexual activity: Yes     Partners: Female     Social Drivers of Health     Financial Resource Strain: Medium Risk (9/17/2024)    Overall Financial Resource Strain (CARDIA)     Difficulty of Paying Living Expenses: Somewhat hard   Food Insecurity: No Food Insecurity (9/17/2024)    Hunger Vital Sign     Worried About Running Out of Food in the Last Year: Never true     Ran Out of Food in the Last Year: Never true   Transportation Needs: No Transportation Needs (9/17/2024)    PRAPARE - Transportation     Lack of  Transportation (Medical): No     Lack of Transportation (Non-Medical): No   Physical Activity: Insufficiently Active (9/17/2024)    Exercise Vital Sign     Days of Exercise per Week: 3 days     Minutes of Exercise per Session: 30 min   Stress: No Stress Concern Present (9/17/2024)    [employer] - Occupational Stress Questionnaire     Feeling of Stress : Not at all   Housing Stability: Low Risk  (9/17/2024)    Housing Stability Vital Sign     Unable to Pay for Housing in the Last Year: No     Homeless in the Last Year: No     Past Surgical History:   Procedure Laterality Date    ESOPHAGOGASTRODUODENOSCOPY  11/13/2012    SKIN LESION EXCISION      removed from head       Review of Systems   Constitutional: Negative.  Negative for chills and diaphoresis.   HENT:  Positive for nasal congestion, postnasal drip and sinus pressure/congestion. Negative for ear pain, hoarse voice, sneezing and sore throat.    Eyes: Negative.    Respiratory:  Positive for cough (Declines medication for cough). Negative for shortness of breath.    Cardiovascular: Negative.    Gastrointestinal: Negative.    Endocrine: Negative.    Genitourinary: Negative.    Musculoskeletal: Negative.  Negative for neck pain.   Integumentary:  Negative.   Allergic/Immunologic: Negative.    Neurological:  Positive for headaches.   Psychiatric/Behavioral: Negative.            Objective     Physical Exam  Vitals and nursing note reviewed.   Constitutional:       Appearance: Normal appearance.   HENT:      Head: Normocephalic.      Right Ear: Tympanic membrane, ear canal and external ear normal.      Left Ear: Tympanic membrane, ear canal and external ear normal.      Nose: Congestion and rhinorrhea present.      Mouth/Throat:      Mouth: Mucous membranes are moist.      Pharynx: Oropharynx is clear.   Eyes:      Conjunctiva/sclera: Conjunctivae normal.      Pupils: Pupils are equal, round, and reactive to light.   Cardiovascular:       Rate and Rhythm: Normal rate and regular rhythm.      Pulses: Normal pulses.      Heart sounds: Normal heart sounds.   Pulmonary:      Effort: Pulmonary effort is normal.      Breath sounds: Normal breath sounds.   Abdominal:      General: Bowel sounds are normal.      Palpations: Abdomen is soft.   Musculoskeletal:         General: Normal range of motion.      Cervical back: Normal range of motion and neck supple.   Skin:     General: Skin is warm and dry.      Capillary Refill: Capillary refill takes 2 to 3 seconds.   Neurological:      Mental Status: He is alert and oriented to person, place, and time.   Psychiatric:         Mood and Affect: Mood normal.         Behavior: Behavior normal.         Thought Content: Thought content normal.         Judgment: Judgment normal.            Assessment and Plan     1. Sinusitis, unspecified chronicity, unspecified location  2. Cough, unspecified type  Orders:  -     POCT COVID-19 Rapid Screening  -     POCT Influenza A/B Molecular        -     azithromycin (Z-HOWARD) 250 MG tablet; Take 2 tablets by mouth on day 1; Take 1 tablet by mouth on days 2-5  Dispense: 6 tablet; Refill: 0  Hydrate well  Rest  Saline nasal spray OTC as directed  Report to ER immediately if symptoms worsen or persist               No follow-ups on file.

## 2025-02-01 NOTE — PATIENT INSTRUCTIONS
Hydrate well  Rest  Saline nasal spray OTC as directed  Report to ER immediately if symptoms worsen or persist

## 2025-02-14 ENCOUNTER — PATIENT MESSAGE (OUTPATIENT)
Dept: FAMILY MEDICINE | Facility: CLINIC | Age: 70
End: 2025-02-14
Payer: MEDICARE

## 2025-03-03 ENCOUNTER — PATIENT MESSAGE (OUTPATIENT)
Dept: FAMILY MEDICINE | Facility: CLINIC | Age: 70
End: 2025-03-03
Payer: MEDICARE

## 2025-03-06 ENCOUNTER — PATIENT MESSAGE (OUTPATIENT)
Dept: ADMINISTRATIVE | Facility: HOSPITAL | Age: 70
End: 2025-03-06
Payer: MEDICARE

## 2025-03-14 ENCOUNTER — PATIENT MESSAGE (OUTPATIENT)
Dept: ADMINISTRATIVE | Facility: HOSPITAL | Age: 70
End: 2025-03-14
Payer: MEDICARE

## 2025-03-29 ENCOUNTER — NURSE TRIAGE (OUTPATIENT)
Dept: ADMINISTRATIVE | Facility: CLINIC | Age: 70
End: 2025-03-29
Payer: MEDICARE

## 2025-03-29 DIAGNOSIS — U07.1 COVID-19 VIRUS DETECTED: ICD-10-CM

## 2025-03-29 NOTE — TELEPHONE ENCOUNTER
Reason for Disposition   SEVERE difficulty breathing (e.g., struggling for each breath, speaks in single words)    Protocols used: Sinus Pain or Congestion-A-AH  Pt states he has has a sinus infection. Pt then stated he is struggling to catch his breath. Advised per the Triage protocol to dial 911 for EMS. Pt verbalized understanding.

## 2025-03-31 ENCOUNTER — TELEPHONE (OUTPATIENT)
Dept: FAMILY MEDICINE | Facility: CLINIC | Age: 70
End: 2025-03-31
Payer: MEDICARE

## 2025-03-31 ENCOUNTER — PATIENT MESSAGE (OUTPATIENT)
Dept: FAMILY MEDICINE | Facility: CLINIC | Age: 70
End: 2025-03-31
Payer: MEDICARE

## 2025-03-31 NOTE — TELEPHONE ENCOUNTER
----- Message from Lexie sent at 3/31/2025  1:58 PM CDT -----  Contact: [phone]  .[email or web address] Patient is calling for Medical Advice regarding:has covid gave him Paxlovid How long has patient had these symptoms:Pharmacy name and phone#:Patient wants a call back or [facility]:call back Comments:He states it has a bitter taste and he states he feels nauseated on this and is asking if there is something else he can take he states it taste like an insecticide Please advise Please advise patient replies from provider may take up to 48 hours.

## 2025-03-31 NOTE — TELEPHONE ENCOUNTER
I spoke with the patient about this.  Pt reports he was given Paxlovid by the ED for Covid. Pt reports this medication is causing a bitter taste in his mouth. Pt requesting recommendations. Please advise if audio/ virtual visit needs to be scheduled.

## 2025-04-23 ENCOUNTER — PATIENT MESSAGE (OUTPATIENT)
Dept: FAMILY MEDICINE | Facility: CLINIC | Age: 70
End: 2025-04-23
Payer: MEDICARE

## 2025-05-22 ENCOUNTER — PATIENT OUTREACH (OUTPATIENT)
Dept: ADMINISTRATIVE | Facility: HOSPITAL | Age: 70
End: 2025-05-22
Payer: MEDICARE

## 2025-06-01 PROBLEM — Z71.89 ACP (ADVANCE CARE PLANNING): Status: ACTIVE | Noted: 2025-06-01

## 2025-06-01 PROBLEM — M00.9 SEPTIC ARTHRITIS OF ELBOW, RIGHT: Status: ACTIVE | Noted: 2025-06-01

## 2025-06-01 PROBLEM — E11.9 TYPE 2 DIABETES MELLITUS: Status: ACTIVE | Noted: 2025-06-01

## 2025-06-01 PROBLEM — E78.5 HLD (HYPERLIPIDEMIA): Status: ACTIVE | Noted: 2025-06-01

## 2025-06-04 ENCOUNTER — TELEPHONE (OUTPATIENT)
Dept: INFECTIOUS DISEASES | Facility: CLINIC | Age: 70
End: 2025-06-04
Payer: MEDICARE

## 2025-06-06 ENCOUNTER — TELEPHONE (OUTPATIENT)
Dept: FAMILY MEDICINE | Facility: CLINIC | Age: 70
End: 2025-06-06
Payer: MEDICARE

## 2025-06-10 ENCOUNTER — PATIENT MESSAGE (OUTPATIENT)
Dept: FAMILY MEDICINE | Facility: CLINIC | Age: 70
End: 2025-06-10
Payer: MEDICARE

## 2025-06-18 DIAGNOSIS — E11.9 TYPE 2 DIABETES MELLITUS WITHOUT COMPLICATION: ICD-10-CM

## 2025-06-26 ENCOUNTER — OFFICE VISIT (OUTPATIENT)
Dept: INFECTIOUS DISEASES | Facility: CLINIC | Age: 70
End: 2025-06-26
Payer: MEDICARE

## 2025-06-26 VITALS
SYSTOLIC BLOOD PRESSURE: 151 MMHG | DIASTOLIC BLOOD PRESSURE: 88 MMHG | HEIGHT: 72 IN | WEIGHT: 315 LBS | TEMPERATURE: 98 F | HEART RATE: 97 BPM | BODY MASS INDEX: 42.66 KG/M2 | RESPIRATION RATE: 20 BRPM

## 2025-06-26 DIAGNOSIS — M00.9 PYOGENIC ARTHRITIS OF RIGHT ELBOW, DUE TO UNSPECIFIED ORGANISM: Primary | ICD-10-CM

## 2025-06-26 PROCEDURE — 99999 PR PBB SHADOW E&M-EST. PATIENT-LVL III: CPT | Mod: PBBFAC,,, | Performed by: PHYSICIAN ASSISTANT

## 2025-06-26 NOTE — PROGRESS NOTES
[facility]  Infectious Disease        Patient ID: Marcos Marinelli is a 69 y.o. male.    Chief Complaint: Follow-up      Marcos was seen today for follow-up.    Diagnoses and all orders for this visit:    Pyogenic arthritis of right elbow, due to unspecified organism       31 minutes was spent on this encounter, which included: review of recent encounters, review and interpretation of labs/images, obtaining pertinent history, performing a physical examination, counseling and educating the patient/family/caregiver, ordering medications/tests, documenting in the electronic health record, and coordinating care with necessary providers.    Interval HPI:   6/26/25 - ID clinic f/u. Patient reports feeling improved since hospital discharge. He does still have some pain and limited ROM, unable to fully extend his arm due to arthritis, which medical providers have indicated will be permanent. Pain has progressed from initial lower arm involvement to now include neck and shoulder socket, with impulsive sharp pains in the neck and upper shoulder region. His elbow incision is healed but remains tender. He has been keeping an arm wrap on it for protection. Reports compliance with PO doxy/levofloxacin. He denies fever, chills, or night sweats.  Assessment and Plan:     # Right elbow pain and swelling with concern for septic arthritis  - with no inciting event  - 6/1/25 s/p US-guided arthrocentesis of the right elbow where 10cc of straw/hay-colored fluid was aspirated, and patient had improvement of pain afterwards  - Joint fluid: 20k WBCs (89% segs). Neg crystals. Cultures: Neg, no organisms seen  - 6/1/25 s/p I&D and arthrotomy of right elbow. Findings of brown, cloudy joint fluid  - OR cx: NGTD. No WBCs, epithelial cells, or organisms seen  - Unclear if presentation is due to infection vs inflammatory arthritis, however Orthopedic surgery still concerned for infection   - discharged home on PO  Doxycycline 100mg po BID  + Levofloxacin 750mg po q24h x21 days     # T2DM  - Last HbA1c 6.2  - at risk for poor wound healing and infection complications  - optimize BG control        Recommendations:  - cultures remain negative but clinically he feels improved. Still has some antibiotics left  - complete remaining doses of doxy/levofloxacin for possible septic arthritis of unknown micro. Clinical picture unclear  - continue to f/u with Dr. Reyes for joint pain and now neck/shoulder pain?  - discussed that if he has worsening of pain following completion of antibiotics, repeat imaging should be considered  - he may f/u with ID as needed       Infectious Diseases  [facility]        HPI:      68yo male with a PMH of arthritis, GERD, HLD, HTN, and T2DM admitted to [facility] for right elbow pain.     - patient reports that on 5/28/25 he started having pain to his right elbow without inciting event. He denies any swelling, redness, or warmth. Denies preceding trauma or increased activity  - pain continued to worsen and he states his wife noticed some swelling develop. He started alternating heat/ice which gave some relief  - 6/1/25 pain intensified and he had decreased ROM so he presented to [facility]. He reports the pain felt like it alternated between his bone and muscles.  - in the ED he was afebrile without leukocytosis but elevated ESR/CRP. Procal negative  - ER MD performed US-guided arthrocentesis of the right elbow where 10cc of straw/hay-colored fluid was aspirated, and patient had improvement of pain afterwards  - Joint fluid: 20k WBCs (89% segs). Neg crystals. Cultures: Neg, no organisms seen  - Ortho was consulted and concerned for possible septic arthritis  - 6/1/25 underwent I&D and arthrotomy of right elbow. Findings of brown, cloudy joint fluid  - OR cx: NGTD. No WBCs, epithelial cells, or organisms seen     Infectious Diseases has been consulted for evaluation and management of possible  infected joint.      Past Medical History:   Diagnosis Date    Anxiety     Arthritis     carpal tunnel    Cataract     Colon polyp 11/13/2012    repeat colonosopy in 5 years.    Combined hyperlipidemia associated with type 2 diabetes mellitus 2/28/2015    ED (erectile dysfunction)     Environmental allergies     GERD (gastroesophageal reflux disease)     Hiatal hernia 11/13/2012    found on EGD    Hyperglycemia     Hyperlipidemia LDL goal < 130     Hypertension goal BP (blood pressure) < 140/90     Insomnia     Strabismus     od as child//    Torn meniscus w-2    right knee    Tremor     controlled with xanax       Past Surgical History:   Procedure Laterality Date    ARTHROTOMY OF ELBOW Right 6/1/2025    Procedure: ARTHROTOMY, ELBOW;  Surgeon: Eric Reyes MD;  Location: [facility] OR;  Service: Orthopedics;  Laterality: Right;    ESOPHAGOGASTRODUODENOSCOPY  11/13/2012    INCISION AND DRAINAGE, UPPER EXTREMITY Right 6/1/2025    Procedure: INCISION AND DRAINAGE, UPPER EXTREMITY;  Surgeon: Eric Reyes MD;  Location: [facility] OR;  Service: Orthopedics;  Laterality: Right;    SKIN LESION EXCISION      removed from head       Family History   Problem Relation Name Age of Onset    Heart attack Mother      Hypertension Mother      Heart attack Father      Hypertension Father      Brain cancer Father      Cancer Father      Glaucoma Father      Glaucoma Paternal Aunt      Amblyopia Neg Hx      Blindness Neg Hx      Cataracts Neg Hx      Diabetes Neg Hx      Retinal detachment Neg Hx      Strabismus Neg Hx      Stroke Neg Hx      Thyroid disease Neg Hx      Macular degeneration Neg Hx         Social History     Socioeconomic History    Marital status:    Tobacco Use    Smoking status: Never    Smokeless tobacco: Never   Substance and Sexual Activity    Alcohol use: No    Drug use: No    Sexual activity: Yes     Partners: Female     Social Drivers of Health     Financial Resource Strain: Low Risk  (6/2/2025)    Overall  Financial Resource Strain (CARDIA)     Difficulty of Paying Living Expenses: Not very hard   Food Insecurity: No Food Insecurity (6/2/2025)    Hunger Vital Sign     Worried About Running Out of Food in the Last Year: Never true     Ran Out of Food in the Last Year: Never true   Transportation Needs: No Transportation Needs (6/2/2025)    PRAPARE - Transportation     Lack of Transportation (Medical): No     Lack of Transportation (Non-Medical): No   Physical Activity: Insufficiently Active (9/17/2024)    Exercise Vital Sign     Days of Exercise per Week: 3 days     Minutes of Exercise per Session: 30 min   Stress: No Stress Concern Present (6/2/2025)    [employer] - Occupational Stress Questionnaire     Feeling of Stress : Not at all   Housing Stability: Low Risk  (6/2/2025)    Housing Stability Vital Sign     Unable to Pay for Housing in the Last Year: No     Number of Times Moved in the Last Year: 1     Homeless in the Last Year: No       Review of patient's allergies indicates:   Allergen Reactions    Codeine      Other reaction(s): Unknown  Other reaction(s): Unknown    Escitalopram Other (See Comments)     Other reaction(s): Headache    Iodine     Penicillin      Other reaction(s): Unknown    Penicillins      Other reaction(s): Unknown    Bactrim [sulfamethoxazole-trimethoprim] Rash    Sulfa (sulfonamide antibiotics) Nausea Only and Rash       Current Outpatient Medications   Medication Instructions    carvediloL (COREG) 12.5 mg, Oral, 2 times daily with meals    doxycycline (VIBRA-TABS) 100 mg, Oral, Every 12 hours    levoFLOXacin (LEVAQUIN) 750 mg, Oral, Daily    losartan-hydrochlorothiazide 100-12.5 mg (HYZAAR) 100-12.5 mg Tab 1 tablet, Oral, Daily    oxyCODONE-acetaminophen (PERCOCET) 5-325 mg per tablet 1 tablet, Oral, Every 6 hours PRN    rosuvastatin (CRESTOR) 40 mg, Oral, Nightly         Review of Systems   Constitutional:  Negative for activity change, appetite change,  chills, fatigue and fever.   HENT:  Negative for congestion, mouth sores, sinus pain and sore throat.    Respiratory:  Negative for cough, chest tightness, shortness of breath and wheezing.    Cardiovascular:  Negative for chest pain, palpitations and leg swelling.   Gastrointestinal:  Negative for abdominal pain, diarrhea, nausea and vomiting.   Genitourinary:  Negative for dysuria, flank pain, hematuria and urgency.   Musculoskeletal:  Positive for arthralgias and neck pain. Negative for joint swelling, myalgias and neck stiffness.   Skin:  Negative for color change and rash.   Neurological:  Negative for dizziness, seizures and headaches.   Psychiatric/Behavioral:  Negative for confusion.            Objective:     Vitals:    06/26/25 1142   BP: (!) 151/88   Pulse: 97   Resp: 20   Temp: 97.7 °F (36.5 °C)              Physical Exam  Vitals and nursing note reviewed.   Constitutional:       General: He is awake. He is not in acute distress.     Appearance: He is well-developed and well-groomed. He is not diaphoretic.   HENT:      Head: Normocephalic and atraumatic.      Right Ear: External ear normal.      Left Ear: External ear normal.      Nose: Nose normal.   Eyes:      General: No scleral icterus.        Right eye: No discharge.         Left eye: No discharge.      Pupils: Pupils are equal, round, and reactive to light.   Cardiovascular:      Rate and Rhythm: Normal rate and regular rhythm.   Pulmonary:      Effort: Pulmonary effort is normal. No accessory muscle usage or respiratory distress.   Abdominal:      General: There is no distension.   Musculoskeletal:      Cervical back: Normal range of motion and neck supple. Tenderness present.   Lymphadenopathy:      Cervical: No cervical adenopathy.   Skin:     General: Skin is warm and dry.      Comments: Right elbow incision site healed with improved joint and forearm swelling. Slight dry skin surrounding incision   Neurological:      General: No focal deficit  present.      Mental Status: He is alert and oriented to person, place, and time.   Psychiatric:         Mood and Affect: Mood normal.         Behavior: Behavior normal.       Clinic image 6/26/25      CrCl cannot be calculated (Patient's most recent lab result is older than the maximum 7 days allowed.).      Microbiology Results (last 7 days)       ** No results found for the last 168 hours. **              Significant Labs: All pertinent labs within the past 24 hours have been reviewed.     Significant Imaging: I have reviewed all relevant and available imaging results/findings within the past 24 hours.      Plan -- see top of note

## 2025-06-27 DIAGNOSIS — E78.5 HYPERLIPIDEMIA, UNSPECIFIED HYPERLIPIDEMIA TYPE: ICD-10-CM

## 2025-06-27 NOTE — TELEPHONE ENCOUNTER
Refill Routing Note   Medication(s) are not appropriate for processing by [facility] for the following reason(s):        Required labs outdated    [facility] action(s):  Defer   Requires labs : Yes             Appointments  past 12m or future 3m with PCP    Date Provider   Last Visit   6/21/2024 Ness Cai MD   Next Visit   9/18/2025 Ness Cai MD   ED visits in past 90 days: 1        Note composed:5:28 PM 06/27/2025

## 2025-06-27 NOTE — TELEPHONE ENCOUNTER
Care Due:                  Date            Visit Type   Department     Provider  --------------------------------------------------------------------------------                                EP -                              PRIMARY      [facility]  Last Visit: 06-      CARE ([facility])   [facility] -                              PRIMARY      [facility]  Next Visit: 09-      CARE ([facility])   [facility]  Test          Frequency    Reason                     Performed    Due Date  --------------------------------------------------------------------------------    Lipid Panel.  12 months..  rosuvastatin.............  06-   06-    [facility] Embedded Care Due Messages. Reference number: [id number].   6/27/2025 5:47:54 AM CDT

## 2025-06-30 RX ORDER — ROSUVASTATIN CALCIUM 40 MG/1
40 TABLET, COATED ORAL NIGHTLY
Qty: 90 TABLET | Refills: 0 | Status: SHIPPED | OUTPATIENT
Start: 2025-06-30

## 2025-07-24 ENCOUNTER — TELEPHONE (OUTPATIENT)
Dept: FAMILY MEDICINE | Facility: CLINIC | Age: 70
End: 2025-07-24
Payer: MEDICARE

## 2025-07-24 NOTE — TELEPHONE ENCOUNTER
Copied from CRM #[id number]. Topic: Medications - Medication Question  >> Jul 24, 2025 11:46 AM Jasvir wrote:  .Type:  Medication Questions     Name of Caller: Marcos     Prescription Name:carvediloL (COREG) 12.5 MG tablet    Pharmacy Name: [id number] - [place], [place] - [address] [facility] [address]     Best Call Back Number: [phone] if no answer please respond in pt portal     Additional Information:  Pt is calling in regard to his wellness visit and states his blood pressure was elevated and was informed to reach out to PCP to see if he needs to start back taking the medication

## 2025-08-22 ENCOUNTER — PATIENT OUTREACH (OUTPATIENT)
Dept: ADMINISTRATIVE | Facility: HOSPITAL | Age: 70
End: 2025-08-22
Payer: MEDICARE